# Patient Record
Sex: FEMALE | Race: WHITE | NOT HISPANIC OR LATINO | Employment: FULL TIME | URBAN - NONMETROPOLITAN AREA
[De-identification: names, ages, dates, MRNs, and addresses within clinical notes are randomized per-mention and may not be internally consistent; named-entity substitution may affect disease eponyms.]

---

## 2017-10-24 ENCOUNTER — TRANSCRIBE ORDERS (OUTPATIENT)
Dept: ADMINISTRATIVE | Facility: HOSPITAL | Age: 52
End: 2017-10-24

## 2017-10-24 DIAGNOSIS — Z12.39 SCREENING BREAST EXAMINATION: Primary | ICD-10-CM

## 2017-11-21 ENCOUNTER — HOSPITAL ENCOUNTER (OUTPATIENT)
Dept: MAMMOGRAPHY | Facility: HOSPITAL | Age: 52
Discharge: HOME | End: 2017-11-21

## 2018-05-15 ENCOUNTER — HOSPITAL ENCOUNTER (OUTPATIENT)
Dept: MAMMOGRAPHY | Facility: HOSPITAL | Age: 53
Discharge: HOME | End: 2018-05-15
Admitting: INTERNAL MEDICINE

## 2018-05-15 DIAGNOSIS — Z12.39 SCREENING BREAST EXAMINATION: ICD-10-CM

## 2018-05-15 PROCEDURE — 77063 BREAST TOMOSYNTHESIS BI: CPT | Performed by: RADIOLOGY

## 2018-05-15 PROCEDURE — 77067 SCR MAMMO BI INCL CAD: CPT | Performed by: RADIOLOGY

## 2018-05-15 PROCEDURE — 77063 BREAST TOMOSYNTHESIS BI: CPT

## 2018-05-15 PROCEDURE — 77067 SCR MAMMO BI INCL CAD: CPT

## 2018-11-26 DIAGNOSIS — M25.511 RIGHT SHOULDER PAIN, UNSPECIFIED CHRONICITY: Primary | ICD-10-CM

## 2018-11-27 ENCOUNTER — HOSPITAL ENCOUNTER (OUTPATIENT)
Dept: GENERAL RADIOLOGY | Facility: HOSPITAL | Age: 53
Discharge: HOME | End: 2018-11-27
Attending: ORTHOPAEDIC SURGERY | Admitting: ORTHOPAEDIC SURGERY

## 2018-11-27 ENCOUNTER — OFFICE VISIT (OUTPATIENT)
Dept: ORTHOPEDIC SURGERY | Facility: CLINIC | Age: 53
End: 2018-11-27

## 2018-11-27 VITALS
HEART RATE: 72 BPM | HEIGHT: 64 IN | DIASTOLIC BLOOD PRESSURE: 93 MMHG | WEIGHT: 145 LBS | BODY MASS INDEX: 24.75 KG/M2 | SYSTOLIC BLOOD PRESSURE: 152 MMHG

## 2018-11-27 DIAGNOSIS — M25.511 CHRONIC RIGHT SHOULDER PAIN: Primary | ICD-10-CM

## 2018-11-27 DIAGNOSIS — M25.511 RIGHT SHOULDER PAIN, UNSPECIFIED CHRONICITY: ICD-10-CM

## 2018-11-27 DIAGNOSIS — G89.29 CHRONIC RIGHT SHOULDER PAIN: Primary | ICD-10-CM

## 2018-11-27 PROCEDURE — 20610 DRAIN/INJ JOINT/BURSA W/O US: CPT | Performed by: ORTHOPAEDIC SURGERY

## 2018-11-27 PROCEDURE — 73030 X-RAY EXAM OF SHOULDER: CPT | Performed by: RADIOLOGY

## 2018-11-27 PROCEDURE — 73030 X-RAY EXAM OF SHOULDER: CPT

## 2018-11-27 PROCEDURE — 99203 OFFICE O/P NEW LOW 30 MIN: CPT | Performed by: ORTHOPAEDIC SURGERY

## 2018-11-27 RX ORDER — ATORVASTATIN CALCIUM 10 MG/1
10 TABLET, FILM COATED ORAL DAILY
COMMUNITY

## 2018-11-27 RX ORDER — CHOLECALCIFEROL (VITAMIN D3) 50 MCG
2000 TABLET ORAL DAILY
COMMUNITY

## 2018-11-27 RX ORDER — CETIRIZINE HYDROCHLORIDE 10 MG/1
10 TABLET ORAL DAILY
COMMUNITY

## 2018-11-27 RX ORDER — LEVOTHYROXINE SODIUM 0.07 MG/1
75 TABLET ORAL DAILY
COMMUNITY

## 2018-11-27 RX ADMIN — METHYLPREDNISOLONE ACETATE 80 MG: 40 INJECTION, SUSPENSION INTRA-ARTICULAR; INTRALESIONAL; INTRAMUSCULAR; SOFT TISSUE at 11:34

## 2018-11-27 RX ADMIN — BUPIVACAINE HYDROCHLORIDE 5 ML: 5 INJECTION, SOLUTION EPIDURAL; INTRACAUDAL at 11:34

## 2018-11-27 NOTE — PROGRESS NOTES
Patient: Tarsha White    YOB: 1965      Chief Complaint   Patient presents with   • Right Shoulder - Pain         History of Present Illness:  53-year-old white female predominantly right-handed but writes with her left hand who presents with chronic history of right shoulder pain.  Does not related to any specific injury.  States on the top to the outside of her shoulder.  Sometimes it keeps her up at night.  Does take aspirin for it at times doesn't seem to do to much.  Denies any neck pain numbness or paresthesias in the hand.  Pain with reaching behind her somewhat.  Positive smoking history, nondiabetic    Past Medical History:   Diagnosis Date   • Anemia    • Hyperlipidemia    • Hypertension    • Hypothyroidism    • Tuberculosis         Social History     Socioeconomic History   • Marital status:      Spouse name: Not on file   • Number of children: Not on file   • Years of education: Not on file   • Highest education level: Not on file   Social Needs   • Financial resource strain: Not on file   • Food insecurity - worry: Not on file   • Food insecurity - inability: Not on file   • Transportation needs - medical: Not on file   • Transportation needs - non-medical: Not on file   Occupational History   • Not on file   Tobacco Use   • Smoking status: Current Every Day Smoker     Packs/day: 1.00     Years: 30.00     Pack years: 30.00   • Smokeless tobacco: Never Used   Substance and Sexual Activity   • Alcohol use: No     Comment: Past   • Drug use: No   • Sexual activity: Not on file   Other Topics Concern   • Not on file   Social History Narrative   • Not on file        Review of Systems:    Pertinent positives evaluated and listed in HPI, others systems completed by patient and reviewed today.         Physical Exam: 53 y.o. female  General Appearance:    Alert and oriented x 3, cooperative, in no acute distress                   Vitals:    11/27/18 1104   BP: 152/93   Pulse: 72   Weight:  "65.8 kg (145 lb)   Height: 162.6 cm (64\")          Normal weight white female no apparent acute distress.  Her skin is intact valgus swelling bruising erythema or changes.  Right hand grossly neurovascular intact without swelling.  Good range of motion of neck without obvious discomfort.  She has full forward flexion and abduction compared to her left shoulder.  About 45° of external rotation bilaterally.  Internal rotation is just about T8 on the left and maybe T12 on the right.  Good strength with stressing supraspinatus but more painful on the right than the left.  Good subscapular strength with some mild pain on the right compared to the left.  Mildly positive speed's test.  Mild tenderness with abduction across the before meals joint.  Some mild impingement.        Radiology:     X-rays taken today reviewed myself show normal glenohumeral joint.  She does have moderate to severe acromioclavicular joint arthritis with some superior spurring      Large Joint Arthrocentesis: R subacromial bursa  Date/Time: 11/27/2018 11:34 AM  Consent given by: patient  Site marked: site marked  Supporting Documentation  Indications: pain and diagnostic evaluation   Procedure Details  Location: shoulder - R subacromial bursa  Needle size: 25 G  Approach: lateral  Medications administered: 80 mg methylPREDNISolone acetate 40 MG/ML; 5 mL bupivacaine (PF) 0.5 %  Patient tolerance: patient tolerated the procedure well with no immediate complications                Assessment/Plan: Chronic right shoulder pain.  We'll try one subacromial injection today for both diagnostic therapeutic purposes.  Discussed the detrimental effects of smoking.  We'll see her back in 3 weeks to see if she's had any response        I advised Tarsha of the risks of continuing to use tobacco, and I provided her with tobacco cessation educational materials in the After Visit Summary.     During this visit, I spent 1 minutes counseling the patient regarding " tobacco cessation.        Patient's Body mass index is 24.89 kg/m². BMI is within normal parameters. No follow-up required.        Discussion/Summary:    This chart was completed utilizing the dragon speech recognition software.  Grammatical errors, random word insertions, pronoun errors, and incomplete sentences or occasional consequences of the system due to software limitations, ambient noise, and hardware issues.  Any questions or concerns about the content, text, or information contained within the body of this dictation should be directly addressed to the physician for clarification          This document was signed by Eduardo Mesa M.D. November 27, 2018 11:20 AM

## 2018-11-28 RX ORDER — BUPIVACAINE HYDROCHLORIDE 5 MG/ML
5 INJECTION, SOLUTION EPIDURAL; INTRACAUDAL
Status: COMPLETED | OUTPATIENT
Start: 2018-11-27 | End: 2018-11-27

## 2018-11-28 RX ORDER — METHYLPREDNISOLONE ACETATE 40 MG/ML
80 INJECTION, SUSPENSION INTRA-ARTICULAR; INTRALESIONAL; INTRAMUSCULAR; SOFT TISSUE
Status: COMPLETED | OUTPATIENT
Start: 2018-11-27 | End: 2018-11-27

## 2018-12-06 ENCOUNTER — TELEPHONE (OUTPATIENT)
Dept: ORTHOPEDIC SURGERY | Facility: CLINIC | Age: 53
End: 2018-12-06

## 2018-12-06 NOTE — TELEPHONE ENCOUNTER
Patient called concerning the cortisone injection she received on 11/27/18 right subacromial bursa.  Patient states the right upper arm is cramping and is bruising.     Spoke with Dr. Mesa that can be a residual effect from the injection. Patient was instructed to use heat or ice and to do some gentle stretching if patient would like to go to physical therapy an order can be provided     These instructions were given to the patient. She does not think PT is needed at this time.

## 2018-12-18 ENCOUNTER — OFFICE VISIT (OUTPATIENT)
Dept: ORTHOPEDIC SURGERY | Facility: CLINIC | Age: 53
End: 2018-12-18

## 2018-12-18 DIAGNOSIS — M25.511 CHRONIC RIGHT SHOULDER PAIN: Primary | ICD-10-CM

## 2018-12-18 DIAGNOSIS — S46.111A RUPTURE OF RIGHT LONG HEAD BICEPS TENDON, INITIAL ENCOUNTER: ICD-10-CM

## 2018-12-18 DIAGNOSIS — G89.29 CHRONIC RIGHT SHOULDER PAIN: Primary | ICD-10-CM

## 2018-12-18 PROCEDURE — 99213 OFFICE O/P EST LOW 20 MIN: CPT | Performed by: ORTHOPAEDIC SURGERY

## 2018-12-18 NOTE — PROGRESS NOTES
Tarsha White   :1965    Date of encounter:2018    Chief Complaint   Patient presents with   • Right Shoulder - Follow-up       HPI:  Tarsha White is a 53 y.o. returns here today for follow-up of chronic right shoulder pain.  Her last office visit she received a subacromial steroid shot.  She states that she's doing really well without significant complaints of pain.  She states about 3 days after the injection she developed swelling and bruising along her biceps after lifting an object.  She states initially she had pain with significant swelling and ecchymosis as well as a deformity.  She states the pain and swelling are now gone but she still feels like she has a large biceps muscle.  She states that she isn't having any difficulties with strength and she is no longer having the pain in the shoulder like she was at her prior office visit.    PMH:   Patient Active Problem List   Diagnosis   • Allergic conjunctivitis   • Atopic rhinitis   • Major depressive disorder   • Elevated serum alkaline phosphatase level   • Fatigue   • Hypercalcemia   • Hypertension   • Hypothyroidism following radioiodine therapy   • Pain in joint   • Low back pain   • Lumbar radiculopathy   • Median nerve neuropathy   • Spasm   • Vitamin D deficiency   • Encounter for screening mammogram for malignant neoplasm of breast   • Papanicolaou smear for cervical cancer screening   • Chronic right shoulder pain       Exam:  General Appearance:    53 y.o. female  cooperative, in no acute distress.  Alert and oriented x 3,                 There were no vitals filed for this visit.       There is no height or weight on file to calculate BMI.     examination of the right shoulder reveals no swelling or ecchymosis.  She does have Cruz deformity.  She has good range of motion without any significant weakness. Her neurovascular status is intact.        Assessment    ICD-10-CM ICD-9-CM   1. Chronic right shoulder pain M25.511 719.41     G89.29 338.29   2. Rupture of right long head biceps tendon, initial encounter S46.111A 840.8       Plan:   a 53-year-old female with chronic right shoulder pain.  Previous subacromial steroid injection did improve pain significantly.  She's also had a spontaneous rupture of the long head of the biceps tendon.  She is currently doing well regards to this with no further pain and no complaints of weakness.  We do advise to try to take it easy and avoid any heavy lifting or overhead pushing or pulling.  She'll otherwise return back here on an as-needed basis with any recurrence of pain.    Written by, Anna MCGEE, acting as a scribe for Dr. Mesa

## 2019-05-21 ENCOUNTER — HOSPITAL ENCOUNTER (OUTPATIENT)
Dept: MAMMOGRAPHY | Facility: HOSPITAL | Age: 54
Discharge: HOME | End: 2019-05-21
Admitting: INTERNAL MEDICINE

## 2019-05-21 DIAGNOSIS — Z12.39 SCREENING BREAST EXAMINATION: ICD-10-CM

## 2019-05-21 PROCEDURE — 77067 SCR MAMMO BI INCL CAD: CPT | Performed by: RADIOLOGY

## 2019-05-21 PROCEDURE — 77063 BREAST TOMOSYNTHESIS BI: CPT | Performed by: RADIOLOGY

## 2019-05-21 PROCEDURE — 77063 BREAST TOMOSYNTHESIS BI: CPT

## 2019-05-21 PROCEDURE — 77067 SCR MAMMO BI INCL CAD: CPT

## 2019-08-15 ENCOUNTER — TRANSCRIBE ORDERS (OUTPATIENT)
Dept: ADMINISTRATIVE | Facility: HOSPITAL | Age: 54
End: 2019-08-15

## 2019-08-15 DIAGNOSIS — F17.210 CIGARETTE SMOKER: Primary | ICD-10-CM

## 2019-08-21 ENCOUNTER — APPOINTMENT (OUTPATIENT)
Dept: CT IMAGING | Facility: HOSPITAL | Age: 54
End: 2019-08-21

## 2020-01-06 ENCOUNTER — LAB (OUTPATIENT)
Dept: LAB | Facility: HOSPITAL | Age: 55
End: 2020-01-06

## 2020-01-06 ENCOUNTER — TRANSCRIBE ORDERS (OUTPATIENT)
Dept: ADMINISTRATIVE | Facility: HOSPITAL | Age: 55
End: 2020-01-06

## 2020-01-06 DIAGNOSIS — E89.0 POSTSURGICAL HYPOTHYROIDISM: ICD-10-CM

## 2020-01-06 DIAGNOSIS — E89.0 POSTSURGICAL HYPOTHYROIDISM: Primary | ICD-10-CM

## 2020-01-06 LAB — TSH SERPL DL<=0.05 MIU/L-ACNC: 2.09 UIU/ML (ref 0.27–4.2)

## 2020-01-06 PROCEDURE — 36415 COLL VENOUS BLD VENIPUNCTURE: CPT

## 2020-01-06 PROCEDURE — 84443 ASSAY THYROID STIM HORMONE: CPT

## 2020-11-09 ENCOUNTER — HOSPITAL ENCOUNTER (OUTPATIENT)
Dept: MAMMOGRAPHY | Facility: HOSPITAL | Age: 55
Discharge: HOME | End: 2020-11-09
Admitting: INTERNAL MEDICINE

## 2020-11-09 DIAGNOSIS — Z12.31 VISIT FOR SCREENING MAMMOGRAM: ICD-10-CM

## 2020-11-09 PROCEDURE — 77067 SCR MAMMO BI INCL CAD: CPT

## 2020-11-09 PROCEDURE — 77067 SCR MAMMO BI INCL CAD: CPT | Performed by: RADIOLOGY

## 2020-11-09 PROCEDURE — 77063 BREAST TOMOSYNTHESIS BI: CPT | Performed by: RADIOLOGY

## 2020-11-09 PROCEDURE — 77063 BREAST TOMOSYNTHESIS BI: CPT

## 2021-02-26 ENCOUNTER — TRANSCRIBE ORDERS (OUTPATIENT)
Dept: ADMINISTRATIVE | Facility: HOSPITAL | Age: 56
End: 2021-02-26

## 2021-02-26 DIAGNOSIS — F17.210 CIGARETTE SMOKER: Primary | ICD-10-CM

## 2021-03-11 ENCOUNTER — HOSPITAL ENCOUNTER (OUTPATIENT)
Dept: CT IMAGING | Facility: HOSPITAL | Age: 56
Discharge: HOME | End: 2021-03-11
Admitting: INTERNAL MEDICINE

## 2021-03-11 DIAGNOSIS — F17.210 CIGARETTE SMOKER: ICD-10-CM

## 2021-03-11 PROCEDURE — 71271 CT THORAX LUNG CANCER SCR C-: CPT

## 2021-03-11 PROCEDURE — 71271 CT THORAX LUNG CANCER SCR C-: CPT | Performed by: RADIOLOGY

## 2021-03-16 ENCOUNTER — BULK ORDERING (OUTPATIENT)
Dept: CASE MANAGEMENT | Facility: OTHER | Age: 56
End: 2021-03-16

## 2021-03-16 DIAGNOSIS — Z23 IMMUNIZATION DUE: ICD-10-CM

## 2021-09-09 ENCOUNTER — IMMUNIZATION (OUTPATIENT)
Dept: VACCINE CLINIC | Facility: HOSPITAL | Age: 56
End: 2021-09-09

## 2021-09-09 PROCEDURE — 91300 HC SARSCOV02 VAC 30MCG/0.3ML IM: CPT | Performed by: INTERNAL MEDICINE

## 2021-09-09 PROCEDURE — 0001A: CPT | Performed by: INTERNAL MEDICINE

## 2021-09-30 ENCOUNTER — IMMUNIZATION (OUTPATIENT)
Dept: VACCINE CLINIC | Facility: HOSPITAL | Age: 56
End: 2021-09-30

## 2021-09-30 PROCEDURE — 0002A: CPT | Performed by: INTERNAL MEDICINE

## 2021-09-30 PROCEDURE — 91300 HC SARSCOV02 VAC 30MCG/0.3ML IM: CPT | Performed by: INTERNAL MEDICINE

## 2021-10-13 ENCOUNTER — APPOINTMENT (OUTPATIENT)
Dept: GENERAL RADIOLOGY | Facility: HOSPITAL | Age: 56
End: 2021-10-13

## 2021-10-13 ENCOUNTER — APPOINTMENT (OUTPATIENT)
Dept: CT IMAGING | Facility: HOSPITAL | Age: 56
End: 2021-10-13

## 2021-10-13 ENCOUNTER — HOSPITAL ENCOUNTER (EMERGENCY)
Facility: HOSPITAL | Age: 56
Discharge: ANOTHER HOSPITAL | End: 2021-10-14
Attending: STUDENT IN AN ORGANIZED HEALTH CARE EDUCATION/TRAINING PROGRAM | Admitting: EMERGENCY MEDICINE

## 2021-10-13 DIAGNOSIS — J18.9 PNEUMONIA OF BOTH LUNGS DUE TO INFECTIOUS ORGANISM, UNSPECIFIED PART OF LUNG: ICD-10-CM

## 2021-10-13 DIAGNOSIS — J96.01 ACUTE RESPIRATORY FAILURE WITH HYPOXIA (HCC): ICD-10-CM

## 2021-10-13 DIAGNOSIS — S22.49XA CLOSED FRACTURE OF MULTIPLE RIBS, UNSPECIFIED LATERALITY, INITIAL ENCOUNTER: Primary | ICD-10-CM
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
## 2021-10-13 LAB
ABO GROUP BLD: NORMAL
ALBUMIN SERPL-MCNC: 4.46 G/DL (ref 3.5–5.2)
ALBUMIN/GLOB SERPL: 1.6 G/DL
ALP SERPL-CCNC: 102 U/L (ref 39–117)
ALT SERPL W P-5'-P-CCNC: 39 U/L (ref 1–33)
ANION GAP SERPL CALCULATED.3IONS-SCNC: 11 MMOL/L (ref 5–15)
APTT PPP: 28.3 SECONDS (ref 25.5–35.4)
AST SERPL-CCNC: 53 U/L (ref 1–32)
BASOPHILS # BLD AUTO: 0.07 10*3/MM3 (ref 0–0.2)
BASOPHILS NFR BLD AUTO: 0.3 % (ref 0–1.5)
BILIRUB SERPL-MCNC: 0.2 MG/DL (ref 0–1.2)
BLD GP AB SCN SERPL QL: NEGATIVE
BUN SERPL-MCNC: 13 MG/DL (ref 6–20)
BUN/CREAT SERPL: 14.1 (ref 7–25)
CALCIUM SPEC-SCNC: 8.9 MG/DL (ref 8.6–10.5)
CHLORIDE SERPL-SCNC: 104 MMOL/L (ref 98–107)
CO2 SERPL-SCNC: 23 MMOL/L (ref 22–29)
CREAT SERPL-MCNC: 0.92 MG/DL (ref 0.57–1)
DEPRECATED RDW RBC AUTO: 46.1 FL (ref 37–54)
EOSINOPHIL # BLD AUTO: 0.11 10*3/MM3 (ref 0–0.4)
EOSINOPHIL NFR BLD AUTO: 0.5 % (ref 0.3–6.2)
ERYTHROCYTE [DISTWIDTH] IN BLOOD BY AUTOMATED COUNT: 13.8 % (ref 12.3–15.4)
GFR SERPL CREATININE-BSD FRML MDRD: 63 ML/MIN/1.73
GLOBULIN UR ELPH-MCNC: 2.7 GM/DL
GLUCOSE SERPL-MCNC: 139 MG/DL (ref 65–99)
HCT VFR BLD AUTO: 40.2 % (ref 34–46.6)
HGB BLD-MCNC: 13.1 G/DL (ref 12–15.9)
HOLD SPECIMEN: NORMAL
HOLD SPECIMEN: NORMAL
IMM GRANULOCYTES # BLD AUTO: 0.23 10*3/MM3 (ref 0–0.05)
IMM GRANULOCYTES NFR BLD AUTO: 1.1 % (ref 0–0.5)
INR PPP: 0.99 (ref 0.9–1.1)
LYMPHOCYTES # BLD AUTO: 2.22 10*3/MM3 (ref 0.7–3.1)
LYMPHOCYTES NFR BLD AUTO: 10.9 % (ref 19.6–45.3)
MCH RBC QN AUTO: 29.6 PG (ref 26.6–33)
MCHC RBC AUTO-ENTMCNC: 32.6 G/DL (ref 31.5–35.7)
MCV RBC AUTO: 90.7 FL (ref 79–97)
MONOCYTES # BLD AUTO: 0.89 10*3/MM3 (ref 0.1–0.9)
MONOCYTES NFR BLD AUTO: 4.3 % (ref 5–12)
NEUTROPHILS NFR BLD AUTO: 16.94 10*3/MM3 (ref 1.7–7)
NEUTROPHILS NFR BLD AUTO: 82.9 % (ref 42.7–76)
NRBC BLD AUTO-RTO: 0 /100 WBC (ref 0–0.2)
PLATELET # BLD AUTO: 243 10*3/MM3 (ref 140–450)
PMV BLD AUTO: 9.7 FL (ref 6–12)
POTASSIUM SERPL-SCNC: 3.7 MMOL/L (ref 3.5–5.2)
PROT SERPL-MCNC: 7.2 G/DL (ref 6–8.5)
PROTHROMBIN TIME: 13.5 SECONDS (ref 12.8–14.5)
RBC # BLD AUTO: 4.43 10*6/MM3 (ref 3.77–5.28)
RH BLD: POSITIVE
SODIUM SERPL-SCNC: 138 MMOL/L (ref 136–145)
T&S EXPIRATION DATE: NORMAL
TROPONIN T SERPL-MCNC: <0.01 NG/ML (ref 0–0.03)
WBC # BLD AUTO: 20.46 10*3/MM3 (ref 3.4–10.8)
WHOLE BLOOD HOLD SPECIMEN: NORMAL
WHOLE BLOOD HOLD SPECIMEN: NORMAL

## 2021-10-13 PROCEDURE — 72131 CT LUMBAR SPINE W/O DYE: CPT

## 2021-10-13 PROCEDURE — 80053 COMPREHEN METABOLIC PANEL: CPT | Performed by: NURSE PRACTITIONER

## 2021-10-13 PROCEDURE — 73090 X-RAY EXAM OF FOREARM: CPT

## 2021-10-13 PROCEDURE — 85025 COMPLETE CBC W/AUTO DIFF WBC: CPT | Performed by: NURSE PRACTITIONER

## 2021-10-13 PROCEDURE — 73110 X-RAY EXAM OF WRIST: CPT

## 2021-10-13 PROCEDURE — 70450 CT HEAD/BRAIN W/O DYE: CPT

## 2021-10-13 PROCEDURE — 74177 CT ABD & PELVIS W/CONTRAST: CPT

## 2021-10-13 PROCEDURE — 73080 X-RAY EXAM OF ELBOW: CPT | Performed by: RADIOLOGY

## 2021-10-13 PROCEDURE — 96374 THER/PROPH/DIAG INJ IV PUSH: CPT

## 2021-10-13 PROCEDURE — 84484 ASSAY OF TROPONIN QUANT: CPT | Performed by: STUDENT IN AN ORGANIZED HEALTH CARE EDUCATION/TRAINING PROGRAM

## 2021-10-13 PROCEDURE — 0 IOPAMIDOL PER 1 ML: Performed by: STUDENT IN AN ORGANIZED HEALTH CARE EDUCATION/TRAINING PROGRAM

## 2021-10-13 PROCEDURE — 25010000002 HYDROMORPHONE 1 MG/ML SOLUTION: Performed by: NURSE PRACTITIONER

## 2021-10-13 PROCEDURE — 73090 X-RAY EXAM OF FOREARM: CPT | Performed by: RADIOLOGY

## 2021-10-13 PROCEDURE — 86901 BLOOD TYPING SEROLOGIC RH(D): CPT | Performed by: NURSE PRACTITIONER

## 2021-10-13 PROCEDURE — 71275 CT ANGIOGRAPHY CHEST: CPT

## 2021-10-13 PROCEDURE — 36415 COLL VENOUS BLD VENIPUNCTURE: CPT

## 2021-10-13 PROCEDURE — 85610 PROTHROMBIN TIME: CPT | Performed by: STUDENT IN AN ORGANIZED HEALTH CARE EDUCATION/TRAINING PROGRAM

## 2021-10-13 PROCEDURE — 86850 RBC ANTIBODY SCREEN: CPT | Performed by: NURSE PRACTITIONER

## 2021-10-13 PROCEDURE — 73110 X-RAY EXAM OF WRIST: CPT | Performed by: RADIOLOGY

## 2021-10-13 PROCEDURE — 73080 X-RAY EXAM OF ELBOW: CPT

## 2021-10-13 PROCEDURE — 72125 CT NECK SPINE W/O DYE: CPT

## 2021-10-13 PROCEDURE — 72128 CT CHEST SPINE W/O DYE: CPT

## 2021-10-13 PROCEDURE — 85730 THROMBOPLASTIN TIME PARTIAL: CPT | Performed by: STUDENT IN AN ORGANIZED HEALTH CARE EDUCATION/TRAINING PROGRAM

## 2021-10-13 PROCEDURE — 99284 EMERGENCY DEPT VISIT MOD MDM: CPT

## 2021-10-13 PROCEDURE — 86900 BLOOD TYPING SEROLOGIC ABO: CPT | Performed by: NURSE PRACTITIONER

## 2021-10-13 RX ADMIN — IOPAMIDOL 85 ML: 755 INJECTION, SOLUTION INTRAVENOUS at 22:46

## 2021-10-13 RX ADMIN — HYDROMORPHONE HYDROCHLORIDE 1 MG: 1 INJECTION, SOLUTION INTRAMUSCULAR; INTRAVENOUS; SUBCUTANEOUS at 19:48
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
## 2021-10-14 ENCOUNTER — APPOINTMENT (OUTPATIENT)
Dept: GENERAL RADIOLOGY | Facility: HOSPITAL | Age: 56
End: 2021-10-14

## 2021-10-14 ENCOUNTER — APPOINTMENT (OUTPATIENT)
Dept: CT IMAGING | Facility: HOSPITAL | Age: 56
End: 2021-10-14

## 2021-10-14 VITALS
BODY MASS INDEX: 26.22 KG/M2 | RESPIRATION RATE: 20 BRPM | HEIGHT: 63 IN | SYSTOLIC BLOOD PRESSURE: 129 MMHG | TEMPERATURE: 98.1 F | OXYGEN SATURATION: 95 % | WEIGHT: 148 LBS | HEART RATE: 76 BPM | DIASTOLIC BLOOD PRESSURE: 76 MMHG

## 2021-10-14 LAB
A-A DO2: 44.2 MMHG (ref 0–300)
ARTERIAL PATENCY WRIST A: ABNORMAL
ATMOSPHERIC PRESS: 726 MMHG
BASE EXCESS BLDA CALC-SCNC: -1.6 MMOL/L (ref 0–2)
BDY SITE: ABNORMAL
BODY TEMPERATURE: 0 C
CO2 BLDA-SCNC: 24.3 MMOL/L (ref 22–33)
COHGB MFR BLD: 3.5 % (ref 0–5)
FLUAV RNA RESP QL NAA+PROBE: NOT DETECTED
FLUBV RNA RESP QL NAA+PROBE: NOT DETECTED
HCO3 BLDA-SCNC: 23.2 MMOL/L (ref 20–26)
HCT VFR BLD CALC: 40.1 % (ref 38–51)
HGB BLDA-MCNC: 13.1 G/DL (ref 13.5–17.5)
INHALED O2 CONCENTRATION: 21 %
Lab: ABNORMAL
METHGB BLD QL: 0.3 % (ref 0–3)
MODALITY: ABNORMAL
NOTE: ABNORMAL
OXYHGB MFR BLDV: 87.3 % (ref 94–99)
PCO2 BLDA: 38.5 MM HG (ref 35–45)
PCO2 TEMP ADJ BLD: ABNORMAL MM[HG]
PH BLDA: 7.39 PH UNITS (ref 7.35–7.45)
PH, TEMP CORRECTED: ABNORMAL
PO2 BLDA: 55.1 MM HG (ref 83–108)
PO2 TEMP ADJ BLD: ABNORMAL MM[HG]
SAO2 % BLDCOA: 90.7 % (ref 94–99)
SARS-COV-2 RNA RESP QL NAA+PROBE: NOT DETECTED
VENTILATOR MODE: ABNORMAL

## 2021-10-14 PROCEDURE — 73610 X-RAY EXAM OF ANKLE: CPT

## 2021-10-14 PROCEDURE — 82805 BLOOD GASES W/O2 SATURATION: CPT

## 2021-10-14 PROCEDURE — 83050 HGB METHEMOGLOBIN QUAN: CPT

## 2021-10-14 PROCEDURE — 96376 TX/PRO/DX INJ SAME DRUG ADON: CPT

## 2021-10-14 PROCEDURE — 87636 SARSCOV2 & INF A&B AMP PRB: CPT | Performed by: STUDENT IN AN ORGANIZED HEALTH CARE EDUCATION/TRAINING PROGRAM

## 2021-10-14 PROCEDURE — 82375 ASSAY CARBOXYHB QUANT: CPT

## 2021-10-14 PROCEDURE — 36600 WITHDRAWAL OF ARTERIAL BLOOD: CPT

## 2021-10-14 PROCEDURE — 94799 UNLISTED PULMONARY SVC/PX: CPT

## 2021-10-14 PROCEDURE — 73700 CT LOWER EXTREMITY W/O DYE: CPT

## 2021-10-14 PROCEDURE — 25010000002 HYDROMORPHONE PER 4 MG: Performed by: EMERGENCY MEDICINE

## 2021-10-14 RX ORDER — HYDROMORPHONE HYDROCHLORIDE 1 MG/ML
0.5 INJECTION, SOLUTION INTRAMUSCULAR; INTRAVENOUS; SUBCUTANEOUS ONCE
Status: COMPLETED | OUTPATIENT
Start: 2021-10-14 | End: 2021-10-14

## 2021-10-14 RX ORDER — HYDROCODONE BITARTRATE AND ACETAMINOPHEN 5; 325 MG/1; MG/1
1 TABLET ORAL EVERY 8 HOURS PRN
Qty: 10 TABLET | Refills: 0 | Status: SHIPPED | OUTPATIENT
Start: 2021-10-14

## 2021-10-14 RX ORDER — AMOXICILLIN AND CLAVULANATE POTASSIUM 875; 125 MG/1; MG/1
1 TABLET, FILM COATED ORAL ONCE
Status: COMPLETED | OUTPATIENT
Start: 2021-10-14 | End: 2021-10-14

## 2021-10-14 RX ORDER — AMOXICILLIN AND CLAVULANATE POTASSIUM 875; 125 MG/1; MG/1
1 TABLET, FILM COATED ORAL 2 TIMES DAILY
Qty: 13 TABLET | Refills: 0 | Status: SHIPPED | OUTPATIENT
Start: 2021-10-14

## 2021-10-14 RX ORDER — HYDROCODONE BITARTRATE AND ACETAMINOPHEN 5; 325 MG/1; MG/1
1 TABLET ORAL ONCE
Status: COMPLETED | OUTPATIENT
Start: 2021-10-14 | End: 2021-10-14

## 2021-10-14 RX ADMIN — AMOXICILLIN AND CLAVULANATE POTASSIUM 1 TABLET: 875; 125 TABLET, FILM COATED ORAL at 02:54

## 2021-10-14 RX ADMIN — HYDROCODONE BITARTRATE AND ACETAMINOPHEN 1 TABLET: 5; 325 TABLET ORAL at 04:15

## 2021-10-14 RX ADMIN — HYDROMORPHONE HYDROCHLORIDE 0.5 MG: 1 INJECTION, SOLUTION INTRAMUSCULAR; INTRAVENOUS; SUBCUTANEOUS at 07:47

## 2021-10-14 NOTE — ED NOTES
Called [facility] to transport pt to [facility].  c will call back.      Shilpa Canchola  10/14/21 1709
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
## 2021-10-14 NOTE — ED NOTES
Dr. Garcia is on the line with Dr. Sage with [facility] at this time.      Symes, Heather  10/14/21 4295

## 2021-10-14 NOTE — ED NOTES
Right hand wound cleaned at this time. Cleaned with sterile water, 4x4 gauze applied over area, with conforming stretch gauze applied.      Ernesto Koroma RN  10/14/21 0103

## 2021-10-14 NOTE — ED NOTES
Dr. Garcia made aware of patients right foot throbbing. Per Dr. Garcia with verbal and readback, remove ace bandage and reapply looser. Completed at this time.      Ernesto Koroma, RN  10/14/21 0519

## 2021-10-14 NOTE — ED NOTES
Called [facility] to transport pt to [facility].  emilia accepts with an eta of 9 mins      Shilpa Canchola  10/14/21 1145

## 2021-10-14 NOTE — ED NOTES
Per Dr. Garcia with verbal order and readback, patient can be removed from back board and c-collar. Will proceed with provider orders.      Ernesto Koroma, RN  10/13/21 4941

## 2021-10-14 NOTE — ED NOTES
Per Dr. Garcia, 2L NC was okay to apply at this time. O2 sat was dropping at times to 89% on room air. O2 sat after O2 applied, 94%.     Ernesto Koroma, RN  10/13/21 2402

## 2022-01-26 ENCOUNTER — HOSPITAL ENCOUNTER (OUTPATIENT)
Dept: MAMMOGRAPHY | Facility: HOSPITAL | Age: 57
Discharge: HOME | End: 2022-01-26
Admitting: INTERNAL MEDICINE

## 2022-01-26 DIAGNOSIS — Z12.31 VISIT FOR SCREENING MAMMOGRAM: ICD-10-CM

## 2022-01-26 PROCEDURE — 77067 SCR MAMMO BI INCL CAD: CPT

## 2022-01-26 PROCEDURE — 77063 BREAST TOMOSYNTHESIS BI: CPT | Performed by: RADIOLOGY

## 2022-01-26 PROCEDURE — 77067 SCR MAMMO BI INCL CAD: CPT | Performed by: RADIOLOGY

## 2022-01-26 PROCEDURE — 77063 BREAST TOMOSYNTHESIS BI: CPT

## 2022-03-07 ENCOUNTER — HOSPITAL ENCOUNTER (OUTPATIENT)
Dept: GENERAL RADIOLOGY | Facility: HOSPITAL | Age: 57
Discharge: HOME | End: 2022-03-07

## 2022-03-07 ENCOUNTER — TRANSCRIBE ORDERS (OUTPATIENT)
Dept: ADMINISTRATIVE | Facility: HOSPITAL | Age: 57
End: 2022-03-07

## 2022-03-07 DIAGNOSIS — M54.2 NECK PAIN: ICD-10-CM

## 2022-03-07 DIAGNOSIS — M54.2 NECK PAIN: Primary | ICD-10-CM

## 2022-03-07 PROCEDURE — 72040 X-RAY EXAM NECK SPINE 2-3 VW: CPT | Performed by: RADIOLOGY

## 2022-03-07 PROCEDURE — 72072 X-RAY EXAM THORAC SPINE 3VWS: CPT | Performed by: RADIOLOGY

## 2022-03-07 PROCEDURE — 72040 X-RAY EXAM NECK SPINE 2-3 VW: CPT

## 2022-03-07 PROCEDURE — 72072 X-RAY EXAM THORAC SPINE 3VWS: CPT

## 2022-09-22 ENCOUNTER — TRANSCRIBE ORDERS (OUTPATIENT)
Dept: ADMINISTRATIVE | Facility: HOSPITAL | Age: 57
End: 2022-09-22

## 2022-09-22 DIAGNOSIS — F17.210 CIGARETTE SMOKER: Primary | ICD-10-CM

## 2022-10-12 ENCOUNTER — HOSPITAL ENCOUNTER (OUTPATIENT)
Dept: CT IMAGING | Facility: HOSPITAL | Age: 57
Discharge: HOME | End: 2022-10-12
Admitting: INTERNAL MEDICINE

## 2022-10-12 DIAGNOSIS — F17.210 CIGARETTE SMOKER: ICD-10-CM

## 2022-10-12 PROCEDURE — 71271 CT THORAX LUNG CANCER SCR C-: CPT

## 2022-10-12 PROCEDURE — 71271 CT THORAX LUNG CANCER SCR C-: CPT | Performed by: RADIOLOGY

## 2023-03-22 ENCOUNTER — TRANSCRIBE ORDERS (OUTPATIENT)
Dept: ADMINISTRATIVE | Facility: HOSPITAL | Age: 58
End: 2023-03-22
Payer: MEDICAID

## 2023-03-22 DIAGNOSIS — Z12.39 ENCOUNTER FOR SCREENING FOR MALIGNANT NEOPLASM OF BREAST, UNSPECIFIED SCREENING MODALITY: Primary | ICD-10-CM

## 2023-04-10 ENCOUNTER — OFFICE VISIT (OUTPATIENT)
Dept: PULMONOLOGY | Facility: CLINIC | Age: 58
End: 2023-04-10
Payer: MEDICAID

## 2023-04-10 VITALS
BODY MASS INDEX: 24.8 KG/M2 | WEIGHT: 140 LBS | HEIGHT: 63 IN | OXYGEN SATURATION: 98 % | HEART RATE: 74 BPM | DIASTOLIC BLOOD PRESSURE: 74 MMHG | TEMPERATURE: 98.4 F | SYSTOLIC BLOOD PRESSURE: 126 MMHG

## 2023-04-10 DIAGNOSIS — J43.2 CENTRILOBULAR EMPHYSEMA: Primary | ICD-10-CM

## 2023-04-10 DIAGNOSIS — Z72.0 TOBACCO ABUSE: ICD-10-CM

## 2023-04-10 PROCEDURE — 1160F RVW MEDS BY RX/DR IN RCRD: CPT | Performed by: PHYSICIAN ASSISTANT

## 2023-04-10 PROCEDURE — 99204 OFFICE O/P NEW MOD 45 MIN: CPT | Performed by: PHYSICIAN ASSISTANT

## 2023-04-10 PROCEDURE — 3074F SYST BP LT 130 MM HG: CPT | Performed by: PHYSICIAN ASSISTANT

## 2023-04-10 PROCEDURE — 1159F MED LIST DOCD IN RCRD: CPT | Performed by: PHYSICIAN ASSISTANT

## 2023-04-10 PROCEDURE — 3078F DIAST BP <80 MM HG: CPT | Performed by: PHYSICIAN ASSISTANT

## 2023-04-10 RX ORDER — ALBUTEROL SULFATE 90 UG/1
2 AEROSOL, METERED RESPIRATORY (INHALATION) EVERY 4 HOURS PRN
Qty: 18 G | Refills: 11 | Status: SHIPPED | OUTPATIENT
Start: 2023-04-10

## 2023-04-10 RX ORDER — TRAZODONE HYDROCHLORIDE 50 MG/1
TABLET ORAL
COMMUNITY
Start: 2023-03-27

## 2023-04-10 RX ORDER — LEVOCETIRIZINE DIHYDROCHLORIDE 5 MG/1
TABLET, FILM COATED ORAL
COMMUNITY
Start: 2023-03-27

## 2023-04-10 RX ORDER — BREXPIPRAZOLE 1 MG/1
TABLET ORAL
COMMUNITY
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
## 2023-04-21 ENCOUNTER — TELEPHONE (OUTPATIENT)
Dept: PULMONOLOGY | Facility: CLINIC | Age: 58
End: 2023-04-21
Payer: MEDICAID

## 2023-04-21 NOTE — TELEPHONE ENCOUNTER
Attempted to contact patient to notify her of her alpha 1 antitrypsin results. Left her a message informing her that result was normal and if she had any questions to call the office.

## 2023-04-24 ENCOUNTER — HOSPITAL ENCOUNTER (OUTPATIENT)
Dept: MAMMOGRAPHY | Facility: HOSPITAL | Age: 58
Discharge: HOME | End: 2023-04-24
Admitting: INTERNAL MEDICINE
Payer: MEDICAID

## 2023-04-24 DIAGNOSIS — Z12.39 ENCOUNTER FOR SCREENING FOR MALIGNANT NEOPLASM OF BREAST, UNSPECIFIED SCREENING MODALITY: ICD-10-CM

## 2023-04-24 PROCEDURE — 77063 BREAST TOMOSYNTHESIS BI: CPT

## 2023-04-24 PROCEDURE — 77063 BREAST TOMOSYNTHESIS BI: CPT | Performed by: RADIOLOGY

## 2023-04-24 PROCEDURE — 77067 SCR MAMMO BI INCL CAD: CPT

## 2023-04-24 PROCEDURE — 77067 SCR MAMMO BI INCL CAD: CPT | Performed by: RADIOLOGY

## 2023-05-02 ENCOUNTER — HOSPITAL ENCOUNTER (OUTPATIENT)
Dept: RESPIRATORY THERAPY | Facility: HOSPITAL | Age: 58
Discharge: HOME | End: 2023-05-02
Admitting: PHYSICIAN ASSISTANT
Payer: MEDICAID

## 2023-05-02 VITALS — OXYGEN SATURATION: 95 % | HEART RATE: 72 BPM | RESPIRATION RATE: 16 BRPM

## 2023-05-02 DIAGNOSIS — J43.2 CENTRILOBULAR EMPHYSEMA: ICD-10-CM

## 2023-05-02 PROCEDURE — 94799 UNLISTED PULMONARY SVC/PX: CPT

## 2023-05-02 PROCEDURE — 94729 DIFFUSING CAPACITY: CPT

## 2023-05-02 PROCEDURE — 94640 AIRWAY INHALATION TREATMENT: CPT

## 2023-05-02 PROCEDURE — 94760 N-INVAS EAR/PLS OXIMETRY 1: CPT

## 2023-05-02 PROCEDURE — 94060 EVALUATION OF WHEEZING: CPT

## 2023-05-02 PROCEDURE — 94726 PLETHYSMOGRAPHY LUNG VOLUMES: CPT

## 2023-05-02 RX ORDER — ALBUTEROL SULFATE 2.5 MG/3ML
2.5 SOLUTION RESPIRATORY (INHALATION) ONCE
Status: COMPLETED | OUTPATIENT
Start: 2023-05-02 | End: 2023-05-02

## 2023-05-02 RX ADMIN — ALBUTEROL SULFATE 2.5 MG: 2.5 SOLUTION RESPIRATORY (INHALATION) at 10:18

## 2023-05-03 ENCOUNTER — TELEPHONE (OUTPATIENT)
Dept: PULMONOLOGY | Facility: CLINIC | Age: 58
End: 2023-05-03
Payer: MEDICAID

## 2023-05-03 NOTE — TELEPHONE ENCOUNTER
Contacted patient to discuss PFT results. Notified her that results were normal and did not reveal any obstructive or restrictive lung disease. Will continue albuterol inhaler as needed (reports that she has not had to start using yet).

## 2023-07-31 DIAGNOSIS — M25.512 LEFT SHOULDER PAIN, UNSPECIFIED CHRONICITY: Primary | ICD-10-CM

## 2023-08-01 ENCOUNTER — OFFICE VISIT (OUTPATIENT)
Dept: ORTHOPEDIC SURGERY | Facility: CLINIC | Age: 58
End: 2023-08-01
Payer: MEDICAID

## 2023-08-01 ENCOUNTER — HOSPITAL ENCOUNTER (OUTPATIENT)
Dept: GENERAL RADIOLOGY | Facility: HOSPITAL | Age: 58
Discharge: HOME | End: 2023-08-01
Admitting: PHYSICIAN ASSISTANT
Payer: MEDICAID

## 2023-08-01 VITALS
HEART RATE: 100 BPM | DIASTOLIC BLOOD PRESSURE: 81 MMHG | BODY MASS INDEX: 24.8 KG/M2 | WEIGHT: 140 LBS | HEIGHT: 63 IN | SYSTOLIC BLOOD PRESSURE: 128 MMHG

## 2023-08-01 DIAGNOSIS — M75.82 TENDINITIS OF LEFT ROTATOR CUFF: Primary | ICD-10-CM

## 2023-08-01 PROCEDURE — 73030 X-RAY EXAM OF SHOULDER: CPT | Performed by: RADIOLOGY

## 2023-08-01 PROCEDURE — 73030 X-RAY EXAM OF SHOULDER: CPT

## 2023-08-01 RX ADMIN — METHYLPREDNISOLONE ACETATE 80 MG: 80 INJECTION, SUSPENSION INTRA-ARTICULAR; INTRALESIONAL; INTRAMUSCULAR; SOFT TISSUE at 13:34

## 2023-08-01 RX ADMIN — LIDOCAINE HYDROCHLORIDE 5 ML: 10 INJECTION, SOLUTION EPIDURAL; INFILTRATION; INTRACAUDAL; PERINEURAL at 13:34

## 2023-08-01 NOTE — PROGRESS NOTES
[facility] [place] [facility] Surgery New Patient Visit          Patient: Tarsha White  YOB: 1965  Date of Encounter: 08/01/2023  PCP: Chapo Abel MD      Subjective     Chief Complaint   Patient presents with    Left Shoulder - Pain           History of Present Illness:     Tarsha White is a 58 y.o. female presents today as result of left shoulder pain 3 to 4-month history.  Patient reports only mild pain when this first began however this is progressed to worsening shoulder pain upon any overhead range of motion or repetitive activities.  Patient describes no specific injury.  Patient is in the mid anti-inflammatory medication and activity modification with no alleviation of pain symptoms.  Patient describes no significant catching or locking and denies any instability.  Patient denies any paresthesias.        Patient Active Problem List   Diagnosis    Allergic conjunctivitis    Atopic rhinitis    Major depressive disorder    Elevated serum alkaline phosphatase level    Fatigue    Hypercalcemia    Hypertension    Hypothyroidism following radioiodine therapy    Pain in joint    Low back pain    Lumbar radiculopathy    Median nerve neuropathy    Spasm    Vitamin D deficiency    Encounter for screening mammogram for malignant neoplasm of breast    Papanicolaou smear for cervical cancer screening    Chronic right shoulder pain     Past Medical History:   Diagnosis Date    Anemia     Hyperlipidemia     Hypertension     Hypothyroidism     Tuberculosis      Past Surgical History:   Procedure Laterality Date    BREAST SURGERY      REDUCTION MAMMAPLASTY Bilateral 2000         TUBAL ABDOMINAL LIGATION       Social History     Occupational History    Not on file   Tobacco Use    Smoking status: Every Day     Packs/day: 1.00     Years: 44.00     Pack years: 44.00     Types: Cigarettes    Smokeless tobacco: Never   Vaping Use    Vaping Use: Never used   Substance and Sexual Activity    Alcohol use: No     Comment:  Past    Drug use: No    Sexual activity: Defer    Tarsha White  reports that she has been smoking cigarettes. She has a 44.00 pack-year smoking history. She has never used smokeless tobacco.. I have educated her on the risk of diseases from using tobacco products such as cancer, COPD, and heart disease.     I advised her to quit and she is not willing to quit.    I spent 3  minutes counseling the patient.          Social History     Social History Narrative    Not on file     Family History   Problem Relation Age of Onset    COPD Mother     Thyroid disease Mother     Hypertension Mother     Emphysema Father     Hypertension Father     Heart disease Father     Cancer Father     COPD Sister     Hypertension Sister     Breast cancer Sister 50    Hypertension Brother     Heart disease Brother     Cancer Maternal Aunt     Cancer Paternal Uncle      Current Outpatient Medications   Medication Sig Dispense Refill    Aspirin-Salicylamide-Caffeine (BC HEADACHE POWDER PO) Take  by mouth.      atorvastatin (LIPITOR) 10 MG tablet Take 1 tablet by mouth Daily.      Brexpiprazole (Rexulti) 1 MG tablet Take  by mouth.      Cholecalciferol (VITAMIN D) 2000 units tablet Take 1 tablet by mouth Daily.      gabapentin (NEURONTIN) 300 MG capsule Take 1 capsule by mouth 3 (Three) Times a Day. 90 capsule 3    ibuprofen (ADVIL,MOTRIN) 800 MG tablet Take 1 tablet by mouth 2 (Two) Times a Day As Needed for moderate pain (4-6). 60 tablet 1    levocetirizine (XYZAL) 5 MG tablet TAKE ONE TABLET BY MOUTH EVERY NIGHT AT BEDTIME FOR ALLERGIES      levothyroxine (SYNTHROID, LEVOTHROID) 75 MCG tablet Take 1 tablet by mouth Daily.      metoprolol succinate XL (TOPROL-XL) 25 MG 24 hr tablet Take 1 tablet by mouth daily. 90 tablet 1    traZODone (DESYREL) 50 MG tablet TAKE ONE TABLET BY MOUTH EVERY NIGHT AT BEDTIME FOR INSOMNIA      venlafaxine XR (EFFEXOR-XR) 150 MG 24 hr capsule Take 1 capsule by mouth daily. 90 capsule 1    albuterol sulfate   "(90 Base) MCG/ACT inhaler Inhale 2 puffs Every 4 (Four) Hours As Needed for Wheezing. (Patient not taking: Reported on 8/1/2023) 18 g 11     No current facility-administered medications for this visit.     No Known Allergies         Review of Systems   Constitutional: Negative.   HENT: Negative.     Eyes: Negative.    Cardiovascular: Negative.    Respiratory:  Positive for cough.    Endocrine: Negative.    Hematologic/Lymphatic: Negative.    Skin: Negative.    Musculoskeletal:  Positive for joint pain.        Pertinent positives listed in HPI   Gastrointestinal: Negative.    Genitourinary: Negative.    Neurological: Negative.    Psychiatric/Behavioral:  Positive for depression. The patient is nervous/anxious.    Allergic/Immunologic: Negative.        Objective      Vitals:    08/01/23 1303   BP: 128/81   BP Location: Left arm   Patient Position: Sitting   Cuff Size: Adult   Pulse: 100   Weight: 63.5 kg (140 lb)   Height: 160 cm (63\")      BMI is within normal parameters. No other follow-up for BMI required.      Physical Exam  Vitals and nursing note reviewed.   Constitutional:       General: She is not in acute distress.     Appearance: She is not ill-appearing.   HENT:      Head: Normocephalic and atraumatic.      Right Ear: External ear normal.      Left Ear: External ear normal.      Nose: Nose normal. No congestion or rhinorrhea.   Eyes:      Extraocular Movements: Extraocular movements intact.      Conjunctiva/sclera: Conjunctivae normal.      Pupils: Pupils are equal, round, and reactive to light.   Cardiovascular:      Rate and Rhythm: Normal rate.      Pulses: Normal pulses.   Pulmonary:      Effort: Pulmonary effort is normal. No respiratory distress.      Breath sounds: No stridor.   Abdominal:      General: There is no distension.   Musculoskeletal:      Cervical back: Normal range of motion.      Comments: Left shoulder on examination today reveals painful forward flexion 110 degrees.  Abduction 90 " degrees.  Patient has adequate external and internal rotation of side comparable to contralateral shoulder.  Patient has painful Jobes maneuver and strength remains intact.  Lyles and Neer's testing painful.  Speed's Test negative.  Newton's and Yergason's negative.  Neurovascular status grossly intact left upper extremity   Skin:     General: Skin is warm and dry.      Capillary Refill: Capillary refill takes less than 2 seconds.   Neurological:      General: No focal deficit present.      Mental Status: She is alert and oriented to person, place, and time.   Psychiatric:         Mood and Affect: Mood normal.         Behavior: Behavior normal.         Thought Content: Thought content normal.         Judgment: Judgment normal.               Radiology:        XR Shoulder 2+ View Left    Result Date: 8/1/2023    Acromioclavicular joint space degenerative change.  This report was finalized on 8/1/2023 1:28 PM by Dr. Deep Robles MD.           Assessment/Plan        ICD-10-CM ICD-9-CM   1. Tendinitis of left rotator cuff  M75.82 726.10       58-year-old female with notable left rotator cuff tendinitis/subacromial impingement with some concern for AC joint degenerative change.  As result of the patient's pain and symptoms patient received intra-articular subacromial Depo-Medrol injection 80 mg Depo-Medrol with lidocaine block.  Patient tolerated this procedure well.  She was instructed to return back in 3 weeks for further evaluation of the efficacy of the conservative treatment.      Large Joint Arthrocentesis: L subacromial bursa  Date/Time: 8/1/2023 1:34 PM  Consent given by: patient  Site marked: site marked  Supporting Documentation  Indications: pain and diagnostic evaluation   Procedure Details  Location: shoulder - L subacromial bursa  Needle size: 25 G  Approach: lateral  Medications administered: 80 mg methylPREDNISolone acetate 80 MG/ML; 5 mL lidocaine PF 1% 1 %  Patient tolerance: patient tolerated the  procedure well with no immediate complications                    This document was signed by Steve Rayo PA-C August 1, 2023     CC: Chapo Abel MD      Dictated Utilizing Dragon Dictation:   Please note that portions of this note were completed with a voice recognition program.   Part of this note may be an electronic transcription/translation of spoken language to printed text using the Dragon Dictation System.

## 2023-08-15 RX ORDER — METHYLPREDNISOLONE ACETATE 80 MG/ML
80 INJECTION, SUSPENSION INTRA-ARTICULAR; INTRALESIONAL; INTRAMUSCULAR; SOFT TISSUE
Status: COMPLETED | OUTPATIENT
Start: 2023-08-01 | End: 2023-08-01

## 2023-08-15 RX ORDER — LIDOCAINE HYDROCHLORIDE 10 MG/ML
5 INJECTION, SOLUTION EPIDURAL; INFILTRATION; INTRACAUDAL; PERINEURAL
Status: COMPLETED | OUTPATIENT
Start: 2023-08-01 | End: 2023-08-01

## 2023-08-22 ENCOUNTER — OFFICE VISIT (OUTPATIENT)
Dept: ORTHOPEDIC SURGERY | Facility: CLINIC | Age: 58
End: 2023-08-22
Payer: MEDICAID

## 2023-08-22 VITALS — BODY MASS INDEX: 24.8 KG/M2 | HEIGHT: 63 IN | WEIGHT: 140 LBS

## 2023-08-22 DIAGNOSIS — M25.512 LEFT SHOULDER PAIN, UNSPECIFIED CHRONICITY: ICD-10-CM

## 2023-08-22 DIAGNOSIS — M75.82 TENDINITIS OF LEFT ROTATOR CUFF: Primary | ICD-10-CM

## 2023-08-22 PROCEDURE — 99213 OFFICE O/P EST LOW 20 MIN: CPT | Performed by: PHYSICIAN ASSISTANT

## 2023-08-22 NOTE — PROGRESS NOTES
[facility] [place] [facility] New Patient Visit          Patient: Tarsha White  YOB: 1965  Date of Encounter: 08/22/2023  PCP: Chapo Abel MD      Subjective     Chief Complaint   Patient presents with    Left Shoulder - Follow-up, Pain           History of Present Illness:     Tarsha White is a 58 y.o. female presents today as result of left shoulder pain several month history.  Patient has failed conservative treatment options with continuation of pain and symptoms anterior lateral nature worse upon range of motion and abduction.  Patient has difficulty upon repetitive activities.  Patient continues to report no significant alleviation with anti-inflammatory medication and activity modification.  She reports no new complaints.  Denies any paresthesias currently.         Patient Active Problem List   Diagnosis    Allergic conjunctivitis    Atopic rhinitis    Major depressive disorder    Elevated serum alkaline phosphatase level    Fatigue    Hypercalcemia    Hypertension    Hypothyroidism following radioiodine therapy    Pain in joint    Low back pain    Lumbar radiculopathy    Median nerve neuropathy    Spasm    Vitamin D deficiency    Encounter for screening mammogram for malignant neoplasm of breast    Papanicolaou smear for cervical cancer screening    Chronic right shoulder pain     Past Medical History:   Diagnosis Date    Anemia     Hyperlipidemia     Hypertension     Hypothyroidism     Tuberculosis      Past Surgical History:   Procedure Laterality Date    BREAST SURGERY      REDUCTION MAMMAPLASTY Bilateral 2000         TUBAL ABDOMINAL LIGATION       Social History     Occupational History    Not on file   Tobacco Use    Smoking status: Every Day     Packs/day: 1.00     Years: 44.00     Pack years: 44.00     Types: Cigarettes    Smokeless tobacco: Never   Vaping Use    Vaping Use: Never used   Substance and Sexual Activity    Alcohol use: No     Comment: Past    Drug use: No    Sexual  activity: Nancie    Tarsha White  reports that she has been smoking cigarettes. She has a 44.00 pack-year smoking history. She has never used smokeless tobacco.. I have educated her on the risk of diseases from using tobacco products such as cancer, COPD, and heart disease.     I advised her to quit and she is not willing to quit.    I spent 3  minutes counseling the patient.          Social History     Social History Narrative    Not on file     Family History   Problem Relation Age of Onset    COPD Mother     Thyroid disease Mother     Hypertension Mother     Emphysema Father     Hypertension Father     Heart disease Father     Cancer Father     COPD Sister     Hypertension Sister     Breast cancer Sister 50    Hypertension Brother     Heart disease Brother     Cancer Maternal Aunt     Cancer Paternal Uncle      Current Outpatient Medications   Medication Sig Dispense Refill    albuterol sulfate  (90 Base) MCG/ACT inhaler Inhale 2 puffs Every 4 (Four) Hours As Needed for Wheezing. 18 g 11    Aspirin-Salicylamide-Caffeine (BC HEADACHE POWDER PO) Take  by mouth.      atorvastatin (LIPITOR) 10 MG tablet Take 1 tablet by mouth Daily.      Brexpiprazole (Rexulti) 1 MG tablet Take  by mouth.      Cholecalciferol (VITAMIN D) 2000 units tablet Take 1 tablet by mouth Daily.      gabapentin (NEURONTIN) 300 MG capsule Take 1 capsule by mouth 3 (Three) Times a Day. 90 capsule 3    ibuprofen (ADVIL,MOTRIN) 800 MG tablet Take 1 tablet by mouth 2 (Two) Times a Day As Needed for moderate pain (4-6). 60 tablet 1    levocetirizine (XYZAL) 5 MG tablet TAKE ONE TABLET BY MOUTH EVERY NIGHT AT BEDTIME FOR ALLERGIES      levothyroxine (SYNTHROID, LEVOTHROID) 75 MCG tablet Take 1 tablet by mouth Daily.      metoprolol succinate XL (TOPROL-XL) 25 MG 24 hr tablet Take 1 tablet by mouth daily. 90 tablet 1    traZODone (DESYREL) 50 MG tablet TAKE ONE TABLET BY MOUTH EVERY NIGHT AT BEDTIME FOR INSOMNIA      venlafaxine XR (EFFEXOR-XR)  "150 MG 24 hr capsule Take 1 capsule by mouth daily. 90 capsule 1     No current facility-administered medications for this visit.     No Known Allergies         Review of Systems   Constitutional: Negative.   HENT: Negative.     Eyes: Negative.    Cardiovascular: Negative.    Respiratory:  Positive for cough.    Endocrine: Negative.    Hematologic/Lymphatic: Negative.    Skin: Negative.    Musculoskeletal:  Positive for joint pain.        Pertinent positives listed in HPI   Gastrointestinal: Negative.    Genitourinary: Negative.    Neurological: Negative.    Psychiatric/Behavioral:  Positive for depression. The patient is nervous/anxious.    Allergic/Immunologic: Negative.        Objective      Vitals:    08/22/23 0900   Weight: 63.5 kg (140 lb)   Height: 160 cm (63\")      BMI is within normal parameters. No other follow-up for BMI required.      Physical Exam  Vitals and nursing note reviewed.   Constitutional:       General: She is not in acute distress.     Appearance: She is not ill-appearing.   HENT:      Head: Normocephalic and atraumatic.      Right Ear: External ear normal.      Left Ear: External ear normal.      Nose: Nose normal. No congestion or rhinorrhea.   Eyes:      Extraocular Movements: Extraocular movements intact.      Conjunctiva/sclera: Conjunctivae normal.      Pupils: Pupils are equal, round, and reactive to light.   Cardiovascular:      Rate and Rhythm: Normal rate.      Pulses: Normal pulses.   Pulmonary:      Effort: Pulmonary effort is normal. No respiratory distress.      Breath sounds: No stridor.   Abdominal:      General: There is no distension.   Musculoskeletal:      Cervical back: Normal range of motion.      Comments: Left shoulder on examination today reveals painful forward flexion 110 degrees.  Abduction 90 degrees.  Patient has adequate external and internal rotation of side comparable to contralateral shoulder.  Patient has painful Jobes maneuver and strength remains intact.  " Lyles and Neer's testing painful.  Speed's Test negative.  Gerald's and Yergason's negative.  Neurovascular status grossly intact left upper extremity   Skin:     General: Skin is warm and dry.      Capillary Refill: Capillary refill takes less than 2 seconds.   Neurological:      General: No focal deficit present.      Mental Status: She is alert and oriented to person, place, and time.   Psychiatric:         Mood and Affect: Mood normal.         Behavior: Behavior normal.         Thought Content: Thought content normal.         Judgment: Judgment normal.               Radiology:        No radiology results for the last 30 days.        Assessment/Plan        ICD-10-CM ICD-9-CM   1. Tendinitis of left rotator cuff  M75.82 726.10   2. Left shoulder pain, unspecified chronicity  M25.512 719.41     58-year-old female with notable continuation of left shoulder pain concerning for rotator cuff pathology.  Patient also has AC joint degenerative change.  She seemed to be more symptomatic into the subacromial space and region consistent with rotator cuff pathology versus AC joint degenerative osteoarthritis.  As result of the patient's absent alleviation with conservative treatment and recalcitrance to this, the patient was provided with an MRI order diagnostic imaging MRI left shoulder.  The patient will return back upon completion of this for further evaluation.                  This document was signed by Steve Rayo PA-C August 22, 2023    CC: Chapo Abel MD      Dictated Utilizing Dragon Dictation:   Please note that portions of this note were completed with a voice recognition program.   Part of this note may be an electronic transcription/translation of spoken language to printed text using the Dragon Dictation System.

## 2023-09-13 ENCOUNTER — HOSPITAL ENCOUNTER (OUTPATIENT)
Dept: MRI IMAGING | Facility: HOSPITAL | Age: 58
Discharge: HOME | End: 2023-09-13
Admitting: PHYSICIAN ASSISTANT
Payer: MEDICAID

## 2023-09-13 DIAGNOSIS — M75.82 TENDINITIS OF LEFT ROTATOR CUFF: ICD-10-CM

## 2023-09-13 PROCEDURE — 73221 MRI JOINT UPR EXTREM W/O DYE: CPT

## 2023-09-18 ENCOUNTER — OFFICE VISIT (OUTPATIENT)
Dept: ORTHOPEDIC SURGERY | Facility: CLINIC | Age: 58
End: 2023-09-18
Payer: MEDICAID

## 2023-09-18 VITALS — BODY MASS INDEX: 24.8 KG/M2 | WEIGHT: 140 LBS | HEIGHT: 63 IN

## 2023-09-18 DIAGNOSIS — M75.82 TENDINITIS OF LEFT ROTATOR CUFF: Primary | ICD-10-CM

## 2023-09-18 PROCEDURE — 99213 OFFICE O/P EST LOW 20 MIN: CPT | Performed by: PHYSICIAN ASSISTANT

## 2023-09-18 PROCEDURE — 20610 DRAIN/INJ JOINT/BURSA W/O US: CPT | Performed by: PHYSICIAN ASSISTANT

## 2023-09-18 RX ORDER — METHYLPREDNISOLONE ACETATE 80 MG/ML
80 INJECTION, SUSPENSION INTRA-ARTICULAR; INTRALESIONAL; INTRAMUSCULAR; SOFT TISSUE
Status: COMPLETED | OUTPATIENT
Start: 2023-09-18 | End: 2023-09-18

## 2023-09-18 RX ORDER — LIDOCAINE HYDROCHLORIDE 10 MG/ML
5 INJECTION, SOLUTION EPIDURAL; INFILTRATION; INTRACAUDAL; PERINEURAL
Status: COMPLETED | OUTPATIENT
Start: 2023-09-18 | End: 2023-09-18

## 2023-09-18 RX ADMIN — LIDOCAINE HYDROCHLORIDE 5 ML: 10 INJECTION, SOLUTION EPIDURAL; INFILTRATION; INTRACAUDAL; PERINEURAL at 10:51

## 2023-09-18 RX ADMIN — METHYLPREDNISOLONE ACETATE 80 MG: 80 INJECTION, SUSPENSION INTRA-ARTICULAR; INTRALESIONAL; INTRAMUSCULAR; SOFT TISSUE at 10:51

## 2023-09-18 NOTE — PROGRESS NOTES
[facility] [place] [facility] Surgery Established Patient Visit          Patient: Tarsha White  YOB: 1965  Date of Encounter: 08/22/2023  PCP: Chapo Abel MD      Subjective     Chief Complaint   Patient presents with    Left Shoulder - Follow-up, Pain           History of Present Illness:     Tarsha White is a 58 y.o. female presents today as result of left shoulder pain several month history.  Patient has failed conservative treatment options with continuation of pain and symptoms anterior lateral nature worse upon range of motion and abduction.  Patient has difficulty upon repetitive activities.  Patient continues to report no significant alleviation with anti-inflammatory medication and activity modification.  She reports no new complaints.  Denies any paresthesias currently.  She returns today for MRI results review left shoulder.        Patient Active Problem List   Diagnosis    Allergic conjunctivitis    Atopic rhinitis    Major depressive disorder    Elevated serum alkaline phosphatase level    Fatigue    Hypercalcemia    Hypertension    Hypothyroidism following radioiodine therapy    Pain in joint    Low back pain    Lumbar radiculopathy    Median nerve neuropathy    Spasm    Vitamin D deficiency    Encounter for screening mammogram for malignant neoplasm of breast    Papanicolaou smear for cervical cancer screening    Chronic right shoulder pain     Past Medical History:   Diagnosis Date    Anemia     Hyperlipidemia     Hypertension     Hypothyroidism     Tuberculosis      Past Surgical History:   Procedure Laterality Date    BREAST SURGERY      REDUCTION MAMMAPLASTY Bilateral 2000         TUBAL ABDOMINAL LIGATION       Social History     Occupational History    Not on file   Tobacco Use    Smoking status: Every Day     Packs/day: 1.00     Years: 44.00     Pack years: 44.00     Types: Cigarettes    Smokeless tobacco: Never   Vaping Use    Vaping Use: Never used   Substance and Sexual Activity     Alcohol use: No     Comment: Past    Drug use: No    Sexual activity: Defer    Tarsha White  reports that she has been smoking cigarettes. She has a 44.00 pack-year smoking history. She has never used smokeless tobacco.. I have educated her on the risk of diseases from using tobacco products such as cancer, COPD, and heart disease.     I advised her to quit and she is not willing to quit.    I spent 3  minutes counseling the patient.          Social History     Social History Narrative    Not on file     Family History   Problem Relation Age of Onset    COPD Mother     Thyroid disease Mother     Hypertension Mother     Emphysema Father     Hypertension Father     Heart disease Father     Cancer Father     COPD Sister     Hypertension Sister     Breast cancer Sister 50    Hypertension Brother     Heart disease Brother     Cancer Maternal Aunt     Cancer Paternal Uncle      Current Outpatient Medications   Medication Sig Dispense Refill    albuterol sulfate  (90 Base) MCG/ACT inhaler Inhale 2 puffs Every 4 (Four) Hours As Needed for Wheezing. 18 g 11    Aspirin-Salicylamide-Caffeine (BC HEADACHE POWDER PO) Take  by mouth.      atorvastatin (LIPITOR) 10 MG tablet Take 1 tablet by mouth Daily.      Brexpiprazole (Rexulti) 1 MG tablet Take  by mouth.      Cholecalciferol (VITAMIN D) 2000 units tablet Take 1 tablet by mouth Daily.      gabapentin (NEURONTIN) 300 MG capsule Take 1 capsule by mouth 3 (Three) Times a Day. 90 capsule 3    ibuprofen (ADVIL,MOTRIN) 800 MG tablet Take 1 tablet by mouth 2 (Two) Times a Day As Needed for moderate pain (4-6). 60 tablet 1    levocetirizine (XYZAL) 5 MG tablet TAKE ONE TABLET BY MOUTH EVERY NIGHT AT BEDTIME FOR ALLERGIES      levothyroxine (SYNTHROID, LEVOTHROID) 75 MCG tablet Take 1 tablet by mouth Daily.      metoprolol succinate XL (TOPROL-XL) 25 MG 24 hr tablet Take 1 tablet by mouth daily. 90 tablet 1    traZODone (DESYREL) 50 MG tablet TAKE ONE TABLET BY MOUTH EVERY  "NIGHT AT BEDTIME FOR INSOMNIA      venlafaxine XR (EFFEXOR-XR) 150 MG 24 hr capsule Take 1 capsule by mouth daily. 90 capsule 1     No current facility-administered medications for this visit.     No Known Allergies         Review of Systems   Constitutional: Negative.   HENT: Negative.     Eyes: Negative.    Cardiovascular: Negative.    Respiratory:  Positive for cough.    Endocrine: Negative.    Hematologic/Lymphatic: Negative.    Skin: Negative.    Musculoskeletal:  Positive for joint pain.        Pertinent positives listed in HPI   Gastrointestinal: Negative.    Genitourinary: Negative.    Neurological: Negative.    Psychiatric/Behavioral:  Positive for depression. The patient is nervous/anxious.    Allergic/Immunologic: Negative.        Objective      Vitals:    09/18/23 0919   Weight: 63.5 kg (140 lb)   Height: 160 cm (62.99\")      BMI is within normal parameters. No other follow-up for BMI required.      Physical Exam  Vitals and nursing note reviewed.   Constitutional:       General: She is not in acute distress.     Appearance: She is not ill-appearing.   HENT:      Head: Normocephalic and atraumatic.      Right Ear: External ear normal.      Left Ear: External ear normal.      Nose: Nose normal. No congestion or rhinorrhea.   Eyes:      Extraocular Movements: Extraocular movements intact.      Conjunctiva/sclera: Conjunctivae normal.      Pupils: Pupils are equal, round, and reactive to light.   Cardiovascular:      Rate and Rhythm: Normal rate.      Pulses: Normal pulses.   Pulmonary:      Effort: Pulmonary effort is normal. No respiratory distress.      Breath sounds: No stridor.   Abdominal:      General: There is no distension.   Musculoskeletal:      Cervical back: Normal range of motion.      Comments: Left shoulder on examination today reveals painful forward flexion 110 degrees.  Abduction 90 degrees.  Patient has adequate external and internal rotation of side comparable to contralateral shoulder. "  Patient has painful Jobes maneuver and strength remains intact.  Lyles and Neer's testing painful.  Speed's Test negative.  [place]'s and Yergason's negative.  Neurovascular status grossly intact left upper extremity   Skin:     General: Skin is warm and dry.      Capillary Refill: Capillary refill takes less than 2 seconds.   Neurological:      General: No focal deficit present.      Mental Status: She is alert and oriented to person, place, and time.   Psychiatric:         Mood and Affect: Mood normal.         Behavior: Behavior normal.         Thought Content: Thought content normal.         Judgment: Judgment normal.               Radiology:        MRI Shoulder Left Without Contrast    Result Date: 9/13/2023  1.  Partial tearing of the anterior most fibers of the distal supraspinatus tendon. 2.  T2 signal noted within the superior glenoid labrum that may represent chronic tear.  This report was finalized on 9/13/2023 1:01 PM by Dr. Deep Robles MD.           Assessment/Plan        ICD-10-CM ICD-9-CM   1. Tendinitis of left rotator cuff  M75.82 726.10     58-year-old female with notable continuation of left shoulder pain with MRI report evidence of partial tearing anteriormost fibers of the distal supraspinatus tendon and mild signal change noted within the superior glenoid labrum.  Further discussion was had with the patient and there does not appear to be full-thickness rotator cuff tendon tear.  As result the patient was provided with more with subacromial steroid injection 80 mg Depo-Medrol with lidocaine block injected into the subacromial space of the left shoulder.  She was instructed to return back in 3 weeks.  If no significant alleviation we discussed possibility of referral to Dr. Jhoan Muse discuss arthroscopic rotator cuff debridement/repair .      Large Joint Arthrocentesis: L subacromial bursa  Date/Time: 9/18/2023 10:51 AM  Consent given by: patient  Site marked: site marked  Supporting  Documentation  Indications: pain and diagnostic evaluation   Procedure Details  Location: shoulder - L subacromial bursa  Needle size: 25 G  Approach: lateral  Medications administered: 80 mg methylPREDNISolone acetate 80 MG/ML; 5 mL lidocaine PF 1% 1 %  Patient tolerance: patient tolerated the procedure well with no immediate complications              This document was signed by Steve Rayo PA-C September 18, 2023    CC: Chapo Abel MD      Dictated Utilizing Dragon Dictation:   Please note that portions of this note were completed with a voice recognition program.   Part of this note may be an electronic transcription/translation of spoken language to printed text using the Dragon Dictation System.

## 2023-10-17 ENCOUNTER — OFFICE VISIT (OUTPATIENT)
Dept: ORTHOPEDIC SURGERY | Facility: CLINIC | Age: 58
End: 2023-10-17
Payer: MEDICAID

## 2023-10-17 VITALS — WEIGHT: 140 LBS | HEIGHT: 63 IN | BODY MASS INDEX: 24.8 KG/M2

## 2023-10-17 DIAGNOSIS — M25.512 LEFT SHOULDER PAIN, UNSPECIFIED CHRONICITY: ICD-10-CM

## 2023-10-17 DIAGNOSIS — M75.82 TENDINITIS OF LEFT ROTATOR CUFF: Primary | ICD-10-CM

## 2023-10-17 PROCEDURE — 99213 OFFICE O/P EST LOW 20 MIN: CPT | Performed by: PHYSICIAN ASSISTANT

## 2023-10-17 NOTE — PROGRESS NOTES
[facility] [place] [facility] [place] Patient Visit          Patient: Tarsha White  YOB: 1965  Date of Encounter: 10/17/2023  PCP: Chapo Abel MD      Subjective     Chief Complaint   Patient presents with    Left Shoulder - Follow-up           History of Present Illness:     Tarsha White is a 58 y.o. female presents today as result of left shoulder pain several month history. Patient has failed conservative treatment options in the past that prompted MRI that revealed evidence of partial tear restraining of the supraspinatus tendon with no definitive full-thickness tear.  At last office visit the patient received subacromial injection to which she reports near complete resolution of pain and symptoms.  She denies any other new complaints.  She reports full range of motion with no pain upon activity.  Denies any other paresthesias or difficulties.         Patient Active Problem List   Diagnosis    Allergic conjunctivitis    Atopic rhinitis    Major depressive disorder    Elevated serum alkaline phosphatase level    Fatigue    Hypercalcemia    Hypertension    Hypothyroidism following radioiodine therapy    Pain in joint    Low back pain    Lumbar radiculopathy    Median nerve neuropathy    Spasm    Vitamin D deficiency    Encounter for screening mammogram for malignant neoplasm of breast    Papanicolaou smear for cervical cancer screening    Chronic right shoulder pain     Past Medical History:   Diagnosis Date    Anemia     Hyperlipidemia     Hypertension     Hypothyroidism     Tuberculosis      Past Surgical History:   Procedure Laterality Date    BREAST SURGERY      REDUCTION MAMMAPLASTY Bilateral 2000         TUBAL ABDOMINAL LIGATION       Social History     Occupational History    Not on file   Tobacco Use    Smoking status: Every Day     Packs/day: 1.00     Years: 44.00     Additional pack years: 0.00     Total pack years: 44.00     Types: Cigarettes    Smokeless tobacco: Never   Vaping  Use    Vaping Use: Never used   Substance and Sexual Activity    Alcohol use: No     Comment: Past    Drug use: No    Sexual activity: Defer    Tarsha White  reports that she has been smoking cigarettes. She has a 44.00 pack-year smoking history. She has never used smokeless tobacco.. I have educated her on the risk of diseases from using tobacco products such as cancer, COPD, and heart disease.     I advised her to quit and she is not willing to quit.    I spent 3  minutes counseling the patient.          Social History     Social History Narrative    Not on file     Family History   Problem Relation Age of Onset    COPD Mother     Thyroid disease Mother     Hypertension Mother     Emphysema Father     Hypertension Father     Heart disease Father     Cancer Father     COPD Sister     Hypertension Sister     Breast cancer Sister 50    Hypertension Brother     Heart disease Brother     Cancer Maternal Aunt     Cancer Paternal Uncle      Current Outpatient Medications   Medication Sig Dispense Refill    albuterol sulfate  (90 Base) MCG/ACT inhaler Inhale 2 puffs Every 4 (Four) Hours As Needed for Wheezing. 18 g 11    Aspirin-Salicylamide-Caffeine (BC HEADACHE POWDER PO) Take  by mouth.      atorvastatin (LIPITOR) 10 MG tablet Take 1 tablet by mouth Daily.      Brexpiprazole (Rexulti) 1 MG tablet Take  by mouth.      Cholecalciferol (VITAMIN D) 2000 units tablet Take 1 tablet by mouth Daily.      gabapentin (NEURONTIN) 300 MG capsule Take 1 capsule by mouth 3 (Three) Times a Day. 90 capsule 3    ibuprofen (ADVIL,MOTRIN) 800 MG tablet Take 1 tablet by mouth 2 (Two) Times a Day As Needed for moderate pain (4-6). 60 tablet 1    levocetirizine (XYZAL) 5 MG tablet TAKE ONE TABLET BY MOUTH EVERY NIGHT AT BEDTIME FOR ALLERGIES      levothyroxine (SYNTHROID, LEVOTHROID) 75 MCG tablet Take 1 tablet by mouth Daily.      metoprolol succinate XL (TOPROL-XL) 25 MG 24 hr tablet Take 1 tablet by mouth daily. 90 tablet 1     "traZODone (DESYREL) 50 MG tablet TAKE ONE TABLET BY MOUTH EVERY NIGHT AT BEDTIME FOR INSOMNIA      venlafaxine XR (EFFEXOR-XR) 150 MG 24 hr capsule Take 1 capsule by mouth daily. 90 capsule 1     No current facility-administered medications for this visit.     No Known Allergies         Review of Systems   Constitutional: Negative.   HENT: Negative.     Eyes: Negative.    Cardiovascular: Negative.    Respiratory:  Positive for cough.    Endocrine: Negative.    Hematologic/Lymphatic: Negative.    Skin: Negative.    Musculoskeletal:  Positive for joint pain.        Pertinent positives listed in HPI   Gastrointestinal: Negative.    Genitourinary: Negative.    Neurological: Negative.    Psychiatric/Behavioral:  Positive for depression. The patient is nervous/anxious.    Allergic/Immunologic: Negative.          Objective      Vitals:    10/17/23 0850   Weight: 63.5 kg (140 lb)   Height: 160 cm (63\")      BMI is within normal parameters. No other follow-up for BMI required.      Physical Exam  Vitals and nursing note reviewed.   Constitutional:       General: She is not in acute distress.     Appearance: She is not ill-appearing.   HENT:      Head: Normocephalic and atraumatic.      Right Ear: External ear normal.      Left Ear: External ear normal.      Nose: Nose normal. No congestion or rhinorrhea.   Eyes:      Extraocular Movements: Extraocular movements intact.      Conjunctiva/sclera: Conjunctivae normal.      Pupils: Pupils are equal, round, and reactive to light.   Cardiovascular:      Rate and Rhythm: Normal rate.      Pulses: Normal pulses.   Pulmonary:      Effort: Pulmonary effort is normal. No respiratory distress.      Breath sounds: No stridor.   Abdominal:      General: There is no distension.   Musculoskeletal:      Cervical back: Normal range of motion.      Comments: Left shoulder on examination today reveals  forward flexion 160 degrees.  Abduction 150 degrees.  Patient has adequate external and " internal rotation of side comparable to contralateral shoulder.  Patient has now non painful Jobes maneuver and strength remains intact.  Lyles and Neer's testing non painful.  Speed's Test negative.  [place]'s and Yergason's negative.  Neurovascular status grossly intact left upper extremity   Skin:     General: Skin is warm and dry.      Capillary Refill: Capillary refill takes less than 2 seconds.   Neurological:      General: No focal deficit present.      Mental Status: She is alert and oriented to person, place, and time.   Psychiatric:         Mood and Affect: Mood normal.         Behavior: Behavior normal.         Thought Content: Thought content normal.         Judgment: Judgment normal.                 Radiology:        MRI Shoulder Left Without Contrast    Result Date: 9/13/2023  1.  Partial tearing of the anterior most fibers of the distal supraspinatus tendon. 2.  T2 signal noted within the superior glenoid labrum that may represent chronic tear.  This report was finalized on 9/13/2023 1:01 PM by Dr. Deep Robles MD.      XR Shoulder 2+ View Left    Result Date: 8/1/2023    Acromioclavicular joint space degenerative change.  This report was finalized on 8/1/2023 1:28 PM by Dr. Deep Robles MD.             Assessment/Plan        ICD-10-CM ICD-9-CM   1. Tendinitis of left rotator cuff  M75.82 726.10   2. Left shoulder pain, unspecified chronicity  M25.512 719.41       58-year-old female with notable continuation of left shoulder pain with MRI report evidence of partial tearing anteriormost fibers of the distal supraspinatus tendon and mild signal change noted within the superior glenoid labrum.  Further discussion was had with the patient and there does not appear to be full-thickness rotator cuff tendon tear.  As result of the patient's success and alleviation of pain symptoms with the most recent injection the patient will continue with her daily activities and progression into this with relative  rest and activity modification as needed.  Her pain and swelling are to be her guide.  She will return back upon any complication or worsening of pain/symptoms.                    This document was signed by Steve Rayo PA-C October 17, 2023    CC: Chapo Abel MD      Dictated Utilizing Dragon Dictation:   Please note that portions of this note were completed with a voice recognition program.   Part of this note may be an electronic transcription/translation of spoken language to printed text using the Dragon Dictation System.

## 2024-03-25 ENCOUNTER — TRANSCRIBE ORDERS (OUTPATIENT)
Dept: ADMINISTRATIVE | Facility: HOSPITAL | Age: 59
End: 2024-03-25
Payer: MEDICAID

## 2024-03-25 DIAGNOSIS — F17.210 CIGARETTE SMOKER: Primary | ICD-10-CM

## 2024-04-11 ENCOUNTER — HOSPITAL ENCOUNTER (OUTPATIENT)
Dept: CT IMAGING | Facility: HOSPITAL | Age: 59
Discharge: HOME | End: 2024-04-11
Payer: MEDICAID

## 2024-04-11 DIAGNOSIS — F17.210 CIGARETTE SMOKER: ICD-10-CM

## 2024-04-11 PROCEDURE — 71271 CT THORAX LUNG CANCER SCR C-: CPT

## 2024-06-06 ENCOUNTER — OFFICE VISIT (OUTPATIENT)
Dept: ORTHOPEDIC SURGERY | Facility: CLINIC | Age: 59
End: 2024-06-06
Payer: MEDICAID

## 2024-06-06 VITALS — BODY MASS INDEX: 24.8 KG/M2 | WEIGHT: 140 LBS | HEIGHT: 63 IN

## 2024-06-06 DIAGNOSIS — M75.42 IMPINGEMENT SYNDROME OF LEFT SHOULDER: Primary | ICD-10-CM

## 2024-06-06 RX ORDER — GABAPENTIN 600 MG/1
600 TABLET ORAL 3 TIMES DAILY
COMMUNITY
Start: 2024-05-09

## 2024-06-06 RX ORDER — ATORVASTATIN CALCIUM 40 MG/1
40 TABLET, FILM COATED ORAL NIGHTLY
COMMUNITY
Start: 2024-05-09

## 2024-06-06 RX ORDER — LEVOTHYROXINE SODIUM 0.05 MG/1
50 TABLET ORAL DAILY
COMMUNITY
Start: 2024-05-09

## 2024-06-06 RX ORDER — VENLAFAXINE HYDROCHLORIDE 75 MG/1
225 CAPSULE, EXTENDED RELEASE ORAL DAILY
COMMUNITY
Start: 2024-05-09

## 2024-06-06 RX ADMIN — METHYLPREDNISOLONE ACETATE 80 MG: 80 INJECTION, SUSPENSION INTRA-ARTICULAR; INTRALESIONAL; INTRAMUSCULAR; SOFT TISSUE at 15:41

## 2024-06-06 RX ADMIN — LIDOCAINE HYDROCHLORIDE 5 ML: 10 INJECTION, SOLUTION EPIDURAL; INFILTRATION; INTRACAUDAL; PERINEURAL at 15:41

## 2024-06-08 RX ORDER — LIDOCAINE HYDROCHLORIDE 10 MG/ML
5 INJECTION, SOLUTION EPIDURAL; INFILTRATION; INTRACAUDAL; PERINEURAL
Status: COMPLETED | OUTPATIENT
Start: 2024-06-06 | End: 2024-06-06

## 2024-06-08 RX ORDER — METHYLPREDNISOLONE ACETATE 80 MG/ML
80 INJECTION, SUSPENSION INTRA-ARTICULAR; INTRALESIONAL; INTRAMUSCULAR; SOFT TISSUE
Status: COMPLETED | OUTPATIENT
Start: 2024-06-06 | End: 2024-06-06

## 2024-06-08 NOTE — PROGRESS NOTES
[facility] Orthopaedic Surgery Established Patient Visit          Patient: Tarsha White  YOB: 1965  Date of Encounter: 6/6/2024  PCP: Chapo Abel MD      Subjective     Chief Complaint   Patient presents with    Left Shoulder - Follow-up, Pain           History of Present Illness:     Tarsha White is a 58 y.o. female presents today as result of left shoulder pain.  Once again the patient did have previous MRI that revealed partial supraspinatus tendon strain with no full-thickness tear.  The patient responded to subacromial injection last office visit with near complete resolution of pain symptoms.  She only most recently began to have pain and return of symptoms.      Patient Active Problem List   Diagnosis    Allergic conjunctivitis    Atopic rhinitis    Major depressive disorder    Elevated serum alkaline phosphatase level    Fatigue    Hypercalcemia    Hypertension    Hypothyroidism following radioiodine therapy    Pain in joint    Low back pain    Lumbar radiculopathy    Median nerve neuropathy    Spasm    Vitamin D deficiency    Encounter for screening mammogram for malignant neoplasm of breast    Papanicolaou smear for cervical cancer screening    Chronic right shoulder pain     Past Medical History:   Diagnosis Date    Anemia     Hyperlipidemia     Hypertension     Hypothyroidism     Tuberculosis      Past Surgical History:   Procedure Laterality Date    BREAST SURGERY      REDUCTION MAMMAPLASTY Bilateral 2000         TUBAL ABDOMINAL LIGATION       Social History     Occupational History    Not on file   Tobacco Use    Smoking status: Every Day     Current packs/day: 1.00     Average packs/day: 1 pack/day for 44.0 years (44.0 ttl pk-yrs)     Types: Cigarettes    Smokeless tobacco: Never   Vaping Use    Vaping status: Never Used   Substance and Sexual Activity    Alcohol use: No     Comment: Past    Drug use: No    Sexual activity: Defer    Tarsha White  reports that she has been smoking  cigarettes. She has a 44 pack-year smoking history. She has never used smokeless tobacco.. I have educated her on the risk of diseases from using tobacco products such as cancer, COPD, and heart disease.             Social History     Social History Narrative    Not on file     Family History   Problem Relation Age of Onset    COPD Mother     Thyroid disease Mother     Hypertension Mother     Emphysema Father     Hypertension Father     Heart disease Father     Cancer Father     COPD Sister     Hypertension Sister     Breast cancer Sister 50    Hypertension Brother     Heart disease Brother     Cancer Maternal Aunt     Cancer Paternal Uncle      Current Outpatient Medications   Medication Sig Dispense Refill    albuterol sulfate  (90 Base) MCG/ACT inhaler Inhale 2 puffs Every 4 (Four) Hours As Needed for Wheezing. 18 g 11    Aspirin-Salicylamide-Caffeine (BC HEADACHE POWDER PO) Take  by mouth.      atorvastatin (LIPITOR) 40 MG tablet Take 1 tablet by mouth Every Night.      Brexpiprazole (Rexulti) 1 MG tablet Take  by mouth.      Cholecalciferol (VITAMIN D) 2000 units tablet Take 1 tablet by mouth Daily.      gabapentin (NEURONTIN) 600 MG tablet Take 1 tablet by mouth 3 (Three) Times a Day.      ibuprofen (ADVIL,MOTRIN) 800 MG tablet Take 1 tablet by mouth 2 (Two) Times a Day As Needed for moderate pain (4-6). 60 tablet 1    levocetirizine (XYZAL) 5 MG tablet TAKE ONE TABLET BY MOUTH EVERY NIGHT AT BEDTIME FOR ALLERGIES      levothyroxine (SYNTHROID, LEVOTHROID) 50 MCG tablet Take 1 tablet by mouth Daily.      levothyroxine (SYNTHROID, LEVOTHROID) 75 MCG tablet Take 1 tablet by mouth Daily.      metoprolol succinate XL (TOPROL-XL) 25 MG 24 hr tablet Take 1 tablet by mouth daily. 90 tablet 1    traZODone (DESYREL) 50 MG tablet TAKE ONE TABLET BY MOUTH EVERY NIGHT AT BEDTIME FOR INSOMNIA      venlafaxine XR (EFFEXOR-XR) 75 MG 24 hr capsule Take 3 capsules by mouth Daily.       No current facility-administered  "medications for this visit.     No Known Allergies         Review of Systems   Constitutional: Negative.   HENT: Negative.     Eyes: Negative.    Cardiovascular: Negative.    Respiratory:  Positive for cough.    Endocrine: Negative.    Hematologic/Lymphatic: Negative.    Skin: Negative.    Musculoskeletal:  Positive for joint pain.        Pertinent positives listed in HPI   Gastrointestinal: Negative.    Genitourinary: Negative.    Neurological: Negative.    Psychiatric/Behavioral:  Positive for depression. The patient is nervous/anxious.    Allergic/Immunologic: Negative.          Objective      Vitals:    06/06/24 1031   Weight: 63.5 kg (140 lb)   Height: 160 cm (63\")   PainSc:   8   PainLoc: Shoulder      BMI is within normal parameters. No other follow-up for BMI required.      Physical Exam  Vitals and nursing note reviewed.   Constitutional:       General: She is not in acute distress.     Appearance: She is not ill-appearing.   HENT:      Head: Normocephalic and atraumatic.      Right Ear: External ear normal.      Left Ear: External ear normal.      Nose: Nose normal. No congestion or rhinorrhea.   Eyes:      Extraocular Movements: Extraocular movements intact.      Conjunctiva/sclera: Conjunctivae normal.      Pupils: Pupils are equal, round, and reactive to light.   Cardiovascular:      Rate and Rhythm: Normal rate.      Pulses: Normal pulses.   Pulmonary:      Effort: Pulmonary effort is normal. No respiratory distress.      Breath sounds: No stridor.   Abdominal:      General: There is no distension.   Musculoskeletal:      Cervical back: Normal range of motion.      Comments: Left shoulder on examination today reveals  forward flexion 110 degrees.  Abduction 90 degrees.  Patient has adequate external and internal rotation of side comparable to contralateral shoulder.  Patient has  painful Jobes maneuver and strength remains intact.  Lyles and Neer's testing painful.  Speed's Test negative.  Florence's " and Yergason's negative.  Neurovascular status grossly intact left upper extremity   Skin:     General: Skin is warm and dry.      Capillary Refill: Capillary refill takes less than 2 seconds.   Neurological:      General: No focal deficit present.      Mental Status: She is alert and oriented to person, place, and time.   Psychiatric:         Mood and Affect: Mood normal.         Behavior: Behavior normal.         Thought Content: Thought content normal.         Judgment: Judgment normal.                 Radiology:        CT Chest Low Dose Cancer Screening WO    Result Date: 4/11/2024    Stable exam with no suspicious pulmonary nodules.  ASSESSMENT:  Lung-RADS score: 1 - Negative.  Recommend continued annual screening with a low-dose CT (LDCT) in 12 months.   This report was finalized on 4/11/2024 10:01 AM by Dr. Danish Powell MD.             Assessment/Plan      No diagnosis found.      58-year-old female with notable return of left shoulder pain with MRI report evidence of partial tearing anteriormost fibers of the distal supraspinatus tendon and mild signal change noted within the superior glenoid labrum.  Further discussion was had with the patient and there does not appear to be full-thickness rotator cuff tendon tear.  As result of the patient's success and alleviation of pain symptoms with the most recent injection the patient was provided with a repeat subacromial injection 80 mg Depo-Medrol with lidocaine block injected into the subacromial space left shoulder.  Patient tolerated this procedure well.  She was instructed to return back in 4 weeks for further evaluation of the efficacy of the conservative treatment.  \      Large Joint Arthrocentesis: L subacromial bursa  Date/Time: 6/6/2024 3:41 PM  Consent given by: patient  Site marked: site marked  Supporting Documentation  Indications: pain and diagnostic evaluation   Procedure Details  Location: shoulder - L subacromial bursa  Needle size: 25  G  Approach: lateral  Medications administered: 80 mg methylPREDNISolone acetate 80 MG/ML; 5 mL lidocaine PF 1% 1 %  Patient tolerance: patient tolerated the procedure well with no immediate complications               This document was signed by Steve Rayo PA-C June 6, 2024    CC: Chapo Abel MD      Dictated Utilizing Dragon Dictation:   Please note that portions of this note were completed with a voice recognition program.   Part of this note may be an electronic transcription/translation of spoken language to printed text using the Dragon Dictation System.

## 2024-08-19 ENCOUNTER — HOSPITAL ENCOUNTER (EMERGENCY)
Facility: HOSPITAL | Age: 59
Discharge: HOME | End: 2024-08-19
Attending: STUDENT IN AN ORGANIZED HEALTH CARE EDUCATION/TRAINING PROGRAM | Admitting: STUDENT IN AN ORGANIZED HEALTH CARE EDUCATION/TRAINING PROGRAM
Payer: MEDICAID

## 2024-08-19 VITALS
OXYGEN SATURATION: 97 % | WEIGHT: 140 LBS | SYSTOLIC BLOOD PRESSURE: 176 MMHG | BODY MASS INDEX: 24.8 KG/M2 | RESPIRATION RATE: 17 BRPM | TEMPERATURE: 98.2 F | DIASTOLIC BLOOD PRESSURE: 86 MMHG | HEIGHT: 63 IN | HEART RATE: 75 BPM

## 2024-08-19 DIAGNOSIS — F32.A DEPRESSION, UNSPECIFIED DEPRESSION TYPE: Primary | ICD-10-CM

## 2024-08-19 DIAGNOSIS — F41.9 ANXIETY: ICD-10-CM

## 2024-08-19 LAB
ALBUMIN SERPL-MCNC: 4.4 G/DL (ref 3.5–5.2)
ALBUMIN/GLOB SERPL: 1.6 G/DL
ALP SERPL-CCNC: 96 U/L (ref 39–117)
ALT SERPL W P-5'-P-CCNC: 17 U/L (ref 1–33)
AMPHET+METHAMPHET UR QL: NEGATIVE
AMPHETAMINES UR QL: NEGATIVE
ANION GAP SERPL CALCULATED.3IONS-SCNC: 12 MMOL/L (ref 5–15)
AST SERPL-CCNC: 27 U/L (ref 1–32)
BACTERIA UR QL AUTO: ABNORMAL /HPF
BARBITURATES UR QL SCN: NEGATIVE
BASOPHILS # BLD AUTO: 0.05 10*3/MM3 (ref 0–0.2)
BASOPHILS NFR BLD AUTO: 0.5 % (ref 0–1.5)
BENZODIAZ UR QL SCN: NEGATIVE
BILIRUB SERPL-MCNC: 0.2 MG/DL (ref 0–1.2)
BILIRUB UR QL STRIP: NEGATIVE
BUN SERPL-MCNC: 6 MG/DL (ref 8–23)
BUN/CREAT SERPL: 7.3 (ref 7–25)
BUPRENORPHINE SERPL-MCNC: NEGATIVE NG/ML
CALCIUM SPEC-SCNC: 9.3 MG/DL (ref 8.6–10.5)
CANNABINOIDS SERPL QL: POSITIVE
CHLORIDE SERPL-SCNC: 102 MMOL/L (ref 98–107)
CLARITY UR: CLEAR
CO2 SERPL-SCNC: 22 MMOL/L (ref 22–29)
COCAINE UR QL: NEGATIVE
COLOR UR: YELLOW
CREAT SERPL-MCNC: 0.82 MG/DL (ref 0.57–1)
DEPRECATED RDW RBC AUTO: 44.7 FL (ref 37–54)
EGFRCR SERPLBLD CKD-EPI 2021: 82 ML/MIN/1.73
EOSINOPHIL # BLD AUTO: 0.15 10*3/MM3 (ref 0–0.4)
EOSINOPHIL NFR BLD AUTO: 1.4 % (ref 0.3–6.2)
ERYTHROCYTE [DISTWIDTH] IN BLOOD BY AUTOMATED COUNT: 13.5 % (ref 12.3–15.4)
ETHANOL BLD-MCNC: <10 MG/DL (ref 0–10)
ETHANOL UR QL: <0.01 %
FENTANYL UR-MCNC: NEGATIVE NG/ML
GLOBULIN UR ELPH-MCNC: 2.7 GM/DL
GLUCOSE SERPL-MCNC: 108 MG/DL (ref 65–99)
GLUCOSE UR STRIP-MCNC: NEGATIVE MG/DL
HCT VFR BLD AUTO: 46.7 % (ref 34–46.6)
HGB BLD-MCNC: 15.8 G/DL (ref 12–15.9)
HGB UR QL STRIP.AUTO: ABNORMAL
HOLD SPECIMEN: NORMAL
HOLD SPECIMEN: NORMAL
HYALINE CASTS UR QL AUTO: ABNORMAL /LPF
IMM GRANULOCYTES # BLD AUTO: 0.04 10*3/MM3 (ref 0–0.05)
IMM GRANULOCYTES NFR BLD AUTO: 0.4 % (ref 0–0.5)
KETONES UR QL STRIP: ABNORMAL
LEUKOCYTE ESTERASE UR QL STRIP.AUTO: NEGATIVE
LIPASE SERPL-CCNC: 36 U/L (ref 13–60)
LYMPHOCYTES # BLD AUTO: 2.25 10*3/MM3 (ref 0.7–3.1)
LYMPHOCYTES NFR BLD AUTO: 20.5 % (ref 19.6–45.3)
MAGNESIUM SERPL-MCNC: 2 MG/DL (ref 1.6–2.4)
MCH RBC QN AUTO: 30.3 PG (ref 26.6–33)
MCHC RBC AUTO-ENTMCNC: 33.8 G/DL (ref 31.5–35.7)
MCV RBC AUTO: 89.6 FL (ref 79–97)
METHADONE UR QL SCN: NEGATIVE
MONOCYTES # BLD AUTO: 0.7 10*3/MM3 (ref 0.1–0.9)
MONOCYTES NFR BLD AUTO: 6.4 % (ref 5–12)
NEUTROPHILS NFR BLD AUTO: 7.76 10*3/MM3 (ref 1.7–7)
NEUTROPHILS NFR BLD AUTO: 70.8 % (ref 42.7–76)
NITRITE UR QL STRIP: NEGATIVE
NRBC BLD AUTO-RTO: 0 /100 WBC (ref 0–0.2)
OPIATES UR QL: NEGATIVE
OXYCODONE UR QL SCN: NEGATIVE
PCP UR QL SCN: NEGATIVE
PH UR STRIP.AUTO: 6 [PH] (ref 5–8)
PLATELET # BLD AUTO: 228 10*3/MM3 (ref 140–450)
PMV BLD AUTO: 9.8 FL (ref 6–12)
POTASSIUM SERPL-SCNC: 4 MMOL/L (ref 3.5–5.2)
PROT SERPL-MCNC: 7.1 G/DL (ref 6–8.5)
PROT UR QL STRIP: NEGATIVE
RBC # BLD AUTO: 5.21 10*6/MM3 (ref 3.77–5.28)
RBC # UR STRIP: ABNORMAL /HPF
REF LAB TEST METHOD: ABNORMAL
SODIUM SERPL-SCNC: 136 MMOL/L (ref 136–145)
SP GR UR STRIP: 1.01 (ref 1–1.03)
SQUAMOUS #/AREA URNS HPF: ABNORMAL /HPF
TRICYCLICS UR QL SCN: NEGATIVE
TSH SERPL DL<=0.05 MIU/L-ACNC: 2.67 UIU/ML (ref 0.27–4.2)
UROBILINOGEN UR QL STRIP: ABNORMAL
WBC # UR STRIP: ABNORMAL /HPF
WBC NRBC COR # BLD AUTO: 10.95 10*3/MM3 (ref 3.4–10.8)
WHOLE BLOOD HOLD COAG: NORMAL
WHOLE BLOOD HOLD SPECIMEN: NORMAL

## 2024-08-19 PROCEDURE — 80053 COMPREHEN METABOLIC PANEL: CPT | Performed by: PHYSICIAN ASSISTANT

## 2024-08-19 PROCEDURE — 99284 EMERGENCY DEPT VISIT MOD MDM: CPT

## 2024-08-19 PROCEDURE — 83690 ASSAY OF LIPASE: CPT | Performed by: PHYSICIAN ASSISTANT

## 2024-08-19 PROCEDURE — 84443 ASSAY THYROID STIM HORMONE: CPT | Performed by: PHYSICIAN ASSISTANT

## 2024-08-19 PROCEDURE — 83735 ASSAY OF MAGNESIUM: CPT | Performed by: PHYSICIAN ASSISTANT

## 2024-08-19 PROCEDURE — 85025 COMPLETE CBC W/AUTO DIFF WBC: CPT | Performed by: PHYSICIAN ASSISTANT

## 2024-08-19 PROCEDURE — 82077 ASSAY SPEC XCP UR&BREATH IA: CPT | Performed by: PHYSICIAN ASSISTANT

## 2024-08-19 PROCEDURE — 36415 COLL VENOUS BLD VENIPUNCTURE: CPT | Performed by: PHYSICIAN ASSISTANT

## 2024-08-19 PROCEDURE — 80307 DRUG TEST PRSMV CHEM ANLYZR: CPT | Performed by: PHYSICIAN ASSISTANT

## 2024-08-19 PROCEDURE — 25810000003 SODIUM CHLORIDE 0.9 % SOLUTION: Performed by: PHYSICIAN ASSISTANT

## 2024-08-19 PROCEDURE — 81001 URINALYSIS AUTO W/SCOPE: CPT | Performed by: PHYSICIAN ASSISTANT

## 2024-08-19 RX ORDER — SODIUM CHLORIDE 0.9 % (FLUSH) 0.9 %
10 SYRINGE (ML) INJECTION AS NEEDED
Status: DISCONTINUED | OUTPATIENT
Start: 2024-08-19 | End: 2024-08-19 | Stop reason: HOSPADM

## 2024-08-19 RX ADMIN — SODIUM CHLORIDE 1000 ML: 9 INJECTION, SOLUTION INTRAVENOUS at 12:19

## 2024-08-19 NOTE — NURSING NOTE
"Patient reports she is here because her \"nerves are bad.\" Patient reports she thinks she had a nervous breakdown a few days ago. She denies si, hi, and avh. She rates anxiety 7/10 and depression 4/10.  "

## 2024-08-19 NOTE — NURSING NOTE
Presented pt to Dr. Cowan. He feels patient does not meet admission criteria at this time. New order to dc pt with outpatient resources. [id number].

## 2024-08-19 NOTE — ED PROVIDER NOTES
Subjective   History of Present Illness  59-year-old female presents secondary to anxiety, decreased appetite.  She states that she feels dehydrated.  She states that she has been very depressed and anxious.  She denies any suicidal homicidal ideation.  She denies any AV hallucinations.  She has a past medical history of anemia, hyperlipidemia, hypertension, hypothyroidism.  She presents by private vehicle.      Review of Systems   Constitutional: Negative.  Negative for fever.   HENT: Negative.     Respiratory: Negative.     Cardiovascular: Negative.  Negative for chest pain.   Gastrointestinal: Negative.  Negative for abdominal pain.   Endocrine: Negative.    Genitourinary: Negative.  Negative for dysuria.   Skin: Negative.    Neurological: Negative.    Psychiatric/Behavioral: Negative.     All other systems reviewed and are negative.      Past Medical History:   Diagnosis Date    Anemia     Hyperlipidemia     Hypertension     Hypothyroidism     Tuberculosis        No Known Allergies    Past Surgical History:   Procedure Laterality Date    BREAST SURGERY      REDUCTION MAMMAPLASTY Bilateral 2000         TUBAL ABDOMINAL LIGATION         Family History   Problem Relation Age of Onset    COPD Mother     Thyroid disease Mother     Hypertension Mother     Emphysema Father     Hypertension Father     Heart disease Father     Cancer Father     COPD Sister     Hypertension Sister     Breast cancer Sister 50    Hypertension Brother     Heart disease Brother     Cancer Maternal Aunt     Cancer Paternal Uncle        Social History     Socioeconomic History    Marital status:    Tobacco Use    Smoking status: Every Day     Current packs/day: 1.00     Average packs/day: 1 pack/day for 44.0 years (44.0 ttl pk-yrs)     Types: Cigarettes    Smokeless tobacco: Never   Vaping Use    Vaping status: Never Used   Substance and Sexual Activity    Alcohol use: No     Comment: Past    Drug use: No    Sexual activity: Defer            Objective   Physical Exam  Vitals and nursing note reviewed.   Constitutional:       General: She is not in acute distress.     Appearance: She is well-developed. She is not diaphoretic.   HENT:      Head: Normocephalic and atraumatic.      Right Ear: External ear normal.      Left Ear: External ear normal.      Nose: Nose normal.   Eyes:      Conjunctiva/sclera: Conjunctivae normal.      Pupils: Pupils are equal, round, and reactive to light.   Neck:      Vascular: No JVD.      Trachea: No tracheal deviation.   Cardiovascular:      Rate and Rhythm: Normal rate and regular rhythm.      Heart sounds: Normal heart sounds. No murmur heard.  Pulmonary:      Effort: Pulmonary effort is normal. No respiratory distress.      Breath sounds: Normal breath sounds. No wheezing.   Abdominal:      General: Bowel sounds are normal.      Palpations: Abdomen is soft.      Tenderness: There is no abdominal tenderness.   Musculoskeletal:         General: No deformity. Normal range of motion.      Cervical back: Normal range of motion and neck supple.   Skin:     General: Skin is warm and dry.      Coloration: Skin is not pale.      Findings: No erythema or rash.   Neurological:      Mental Status: She is alert and oriented to person, place, and time.      Cranial Nerves: No cranial nerve deficit.   Psychiatric:         Behavior: Behavior normal.         Thought Content: Thought content normal.         Procedures           ED Course                                             Medical Decision Making  59-year-old female presents secondary to anxiety, decreased appetite.  She states that she feels dehydrated.  She states that she has been very depressed and anxious.  She denies any suicidal homicidal ideation.  She denies any AV hallucinations.  She has a past medical history of anemia, hyperlipidemia, hypertension, hypothyroidism.  She presents by private vehicle.    Problems Addressed:  Anxiety: complicated acute illness or  injury  Depression, unspecified depression type: complicated acute illness or injury    Amount and/or Complexity of Data Reviewed  Labs: ordered. Decision-making details documented in ED Course.  Discussion of management or test interpretation with external provider(s): On-call psychiatrist for the intake nurse.    Risk  Prescription drug management.        Final diagnoses:   Depression, unspecified depression type   Anxiety       ED Disposition  ED Disposition       ED Disposition   Discharge    Condition   Stable    Comment   --               Chapo Abel MD  [address] [id number] [phone]    Schedule an appointment as soon as possible for a visit            Medication List      No changes were made to your prescriptions during this visit.            Eduardo Figueroa PA  08/19/24 2695

## 2024-09-05 ENCOUNTER — HOSPITAL ENCOUNTER (EMERGENCY)
Facility: HOSPITAL | Age: 59
Discharge: PSYCHIATRIC OR SUBSTANCE USE FACILITY | DRG: 918 | End: 2024-09-06
Attending: STUDENT IN AN ORGANIZED HEALTH CARE EDUCATION/TRAINING PROGRAM
Payer: MEDICAID

## 2024-09-05 DIAGNOSIS — T43.8X2A: Primary | ICD-10-CM

## 2024-09-05 LAB
ALBUMIN SERPL-MCNC: 3.9 G/DL (ref 3.5–5.2)
ALBUMIN/GLOB SERPL: 1.6 G/DL
ALP SERPL-CCNC: 80 U/L (ref 39–117)
ALT SERPL W P-5'-P-CCNC: 14 U/L (ref 1–33)
AMPHET+METHAMPHET UR QL: NEGATIVE
AMPHETAMINES UR QL: NEGATIVE
ANION GAP SERPL CALCULATED.3IONS-SCNC: 12.2 MMOL/L (ref 5–15)
APAP SERPL-MCNC: <5 MCG/ML (ref 0–30)
AST SERPL-CCNC: 20 U/L (ref 1–32)
BARBITURATES UR QL SCN: NEGATIVE
BASOPHILS # BLD AUTO: 0.05 10*3/MM3 (ref 0–0.2)
BASOPHILS NFR BLD AUTO: 0.4 % (ref 0–1.5)
BENZODIAZ UR QL SCN: POSITIVE
BILIRUB SERPL-MCNC: 0.2 MG/DL (ref 0–1.2)
BUN SERPL-MCNC: 15 MG/DL (ref 8–23)
BUN/CREAT SERPL: 13 (ref 7–25)
BUPRENORPHINE SERPL-MCNC: NEGATIVE NG/ML
CALCIUM SPEC-SCNC: 8.8 MG/DL (ref 8.6–10.5)
CANNABINOIDS SERPL QL: POSITIVE
CHLORIDE SERPL-SCNC: 103 MMOL/L (ref 98–107)
CO2 SERPL-SCNC: 20.8 MMOL/L (ref 22–29)
COCAINE UR QL: POSITIVE
CREAT SERPL-MCNC: 1.15 MG/DL (ref 0.57–1)
DEPRECATED RDW RBC AUTO: 45.8 FL (ref 37–54)
EGFRCR SERPLBLD CKD-EPI 2021: 54.6 ML/MIN/1.73
EOSINOPHIL # BLD AUTO: 0.01 10*3/MM3 (ref 0–0.4)
EOSINOPHIL NFR BLD AUTO: 0.1 % (ref 0.3–6.2)
ERYTHROCYTE [DISTWIDTH] IN BLOOD BY AUTOMATED COUNT: 13.4 % (ref 12.3–15.4)
ETHANOL BLD-MCNC: <10 MG/DL (ref 0–10)
ETHANOL UR QL: <0.01 %
FENTANYL UR-MCNC: NEGATIVE NG/ML
GLOBULIN UR ELPH-MCNC: 2.4 GM/DL
GLUCOSE BLDC GLUCOMTR-MCNC: 118 MG/DL (ref 70–130)
GLUCOSE SERPL-MCNC: 110 MG/DL (ref 65–99)
HCT VFR BLD AUTO: 37.9 % (ref 34–46.6)
HGB BLD-MCNC: 12.5 G/DL (ref 12–15.9)
IMM GRANULOCYTES # BLD AUTO: 0.09 10*3/MM3 (ref 0–0.05)
IMM GRANULOCYTES NFR BLD AUTO: 0.7 % (ref 0–0.5)
LYMPHOCYTES # BLD AUTO: 1 10*3/MM3 (ref 0.7–3.1)
LYMPHOCYTES NFR BLD AUTO: 7.3 % (ref 19.6–45.3)
MCH RBC QN AUTO: 30.6 PG (ref 26.6–33)
MCHC RBC AUTO-ENTMCNC: 33 G/DL (ref 31.5–35.7)
MCV RBC AUTO: 92.7 FL (ref 79–97)
METHADONE UR QL SCN: NEGATIVE
MONOCYTES # BLD AUTO: 0.5 10*3/MM3 (ref 0.1–0.9)
MONOCYTES NFR BLD AUTO: 3.6 % (ref 5–12)
NEUTROPHILS NFR BLD AUTO: 12.05 10*3/MM3 (ref 1.7–7)
NEUTROPHILS NFR BLD AUTO: 87.9 % (ref 42.7–76)
NRBC BLD AUTO-RTO: 0 /100 WBC (ref 0–0.2)
OPIATES UR QL: NEGATIVE
OXYCODONE UR QL SCN: NEGATIVE
PCP UR QL SCN: NEGATIVE
PLATELET # BLD AUTO: 186 10*3/MM3 (ref 140–450)
PMV BLD AUTO: 9.8 FL (ref 6–12)
POTASSIUM SERPL-SCNC: 3.8 MMOL/L (ref 3.5–5.2)
PROT SERPL-MCNC: 6.3 G/DL (ref 6–8.5)
RBC # BLD AUTO: 4.09 10*6/MM3 (ref 3.77–5.28)
SALICYLATES SERPL-MCNC: <0.3 MG/DL
SODIUM SERPL-SCNC: 136 MMOL/L (ref 136–145)
TRICYCLICS UR QL SCN: POSITIVE
WBC NRBC COR # BLD AUTO: 13.7 10*3/MM3 (ref 3.4–10.8)

## 2024-09-05 PROCEDURE — 80053 COMPREHEN METABOLIC PANEL: CPT | Performed by: STUDENT IN AN ORGANIZED HEALTH CARE EDUCATION/TRAINING PROGRAM

## 2024-09-05 PROCEDURE — 99285 EMERGENCY DEPT VISIT HI MDM: CPT

## 2024-09-05 PROCEDURE — 36415 COLL VENOUS BLD VENIPUNCTURE: CPT

## 2024-09-05 PROCEDURE — 25810000003 SODIUM CHLORIDE 0.9 % SOLUTION: Performed by: STUDENT IN AN ORGANIZED HEALTH CARE EDUCATION/TRAINING PROGRAM

## 2024-09-05 PROCEDURE — 80143 DRUG ASSAY ACETAMINOPHEN: CPT | Performed by: STUDENT IN AN ORGANIZED HEALTH CARE EDUCATION/TRAINING PROGRAM

## 2024-09-05 PROCEDURE — 85025 COMPLETE CBC W/AUTO DIFF WBC: CPT | Performed by: STUDENT IN AN ORGANIZED HEALTH CARE EDUCATION/TRAINING PROGRAM

## 2024-09-05 PROCEDURE — 82077 ASSAY SPEC XCP UR&BREATH IA: CPT | Performed by: STUDENT IN AN ORGANIZED HEALTH CARE EDUCATION/TRAINING PROGRAM

## 2024-09-05 PROCEDURE — P9612 CATHETERIZE FOR URINE SPEC: HCPCS

## 2024-09-05 PROCEDURE — 82948 REAGENT STRIP/BLOOD GLUCOSE: CPT

## 2024-09-05 PROCEDURE — 80307 DRUG TEST PRSMV CHEM ANLYZR: CPT | Performed by: STUDENT IN AN ORGANIZED HEALTH CARE EDUCATION/TRAINING PROGRAM

## 2024-09-05 PROCEDURE — 25810000003 SODIUM CHLORIDE 0.9 % SOLUTION: Performed by: EMERGENCY MEDICINE

## 2024-09-05 PROCEDURE — 80179 DRUG ASSAY SALICYLATE: CPT | Performed by: STUDENT IN AN ORGANIZED HEALTH CARE EDUCATION/TRAINING PROGRAM

## 2024-09-05 PROCEDURE — 93005 ELECTROCARDIOGRAM TRACING: CPT | Performed by: STUDENT IN AN ORGANIZED HEALTH CARE EDUCATION/TRAINING PROGRAM

## 2024-09-05 PROCEDURE — 96360 HYDRATION IV INFUSION INIT: CPT

## 2024-09-05 PROCEDURE — 83735 ASSAY OF MAGNESIUM: CPT | Performed by: EMERGENCY MEDICINE

## 2024-09-05 RX ORDER — SODIUM CHLORIDE 0.9 % (FLUSH) 0.9 %
10 SYRINGE (ML) INJECTION AS NEEDED
Status: DISCONTINUED | OUTPATIENT
Start: 2024-09-05 | End: 2024-09-06 | Stop reason: HOSPADM

## 2024-09-05 RX ADMIN — SODIUM CHLORIDE 1000 ML: 9 INJECTION, SOLUTION INTRAVENOUS at 19:50

## 2024-09-05 RX ADMIN — SODIUM CHLORIDE 1000 ML: 9 INJECTION, SOLUTION INTRAVENOUS at 18:26

## 2024-09-05 NOTE — ED PROVIDER NOTES
Subjective   History of Present Illness    59-year-old female with past medical history of hypertension, hyperlipidemia, hypothyroidism presenting for suicide attempt after medication overdose.  Patient states that she took 50 to 55 tablets of her 25 mg Seroquel at approximately 8 AM this morning.  Patient did receive approximately 500 cc of normal saline en route with EMS for low blood pressure.  Patient denies any acute complaints.    Review of Systems    Past Medical History:   Diagnosis Date    Anemia     Anxiety     Depression     Hyperlipidemia     Hypertension     Hypothyroidism     radiation to kill thyroid    Suicide attempt     attempted OD on Seroquel 9/5/24    Tuberculosis        No Known Allergies    Past Surgical History:   Procedure Laterality Date    BREAST SURGERY      REDUCTION MAMMAPLASTY Bilateral 2000         TUBAL ABDOMINAL LIGATION         Family History   Problem Relation Age of Onset    COPD Mother     Thyroid disease Mother     Hypertension Mother     Emphysema Father     Hypertension Father     Heart disease Father     Cancer Father     COPD Sister     Hypertension Sister     Breast cancer Sister 50    Hypertension Brother     Heart disease Brother     Cancer Maternal Aunt     Cancer Paternal Uncle        Social History     Socioeconomic History    Marital status:    Tobacco Use    Smoking status: Every Day     Current packs/day: 1.00     Average packs/day: 1 pack/day for 44.0 years (44.0 ttl pk-yrs)     Types: Cigarettes    Smokeless tobacco: Never   Vaping Use    Vaping status: Never Used   Substance and Sexual Activity    Alcohol use: No     Comment: Past    Drug use: No    Sexual activity: Defer           Objective   Physical Exam  Vitals and nursing note reviewed.   Constitutional:       General: She is not in acute distress.     Comments: Somnolent but wakes easily to voice   HENT:      Head: Normocephalic.      Mouth/Throat:      Mouth: Mucous membranes are moist.   Eyes:       Extraocular Movements: Extraocular movements intact.      Conjunctiva/sclera: Conjunctivae normal.      Pupils: Pupils are equal, round, and reactive to light.   Cardiovascular:      Rate and Rhythm: Normal rate and regular rhythm.      Pulses: Normal pulses.   Pulmonary:      Effort: Pulmonary effort is normal.      Breath sounds: Normal breath sounds.   Abdominal:      General: Abdomen is flat. There is no distension.      Palpations: Abdomen is soft.      Tenderness: There is no abdominal tenderness.   Musculoskeletal:      Cervical back: Normal range of motion and neck supple.   Skin:     General: Skin is warm and dry.      Capillary Refill: Capillary refill takes less than 2 seconds.   Neurological:      General: No focal deficit present.      Mental Status: She is oriented to person, place, and time.         Procedures  Results for orders placed or performed during the hospital encounter of 09/05/24   Comprehensive Metabolic Panel    Specimen: Blood   Result Value Ref Range    Glucose 110 (H) 65 - 99 mg/dL    BUN 15 8 - 23 mg/dL    Creatinine 1.15 (H) 0.57 - 1.00 mg/dL    Sodium 136 136 - 145 mmol/L    Potassium 3.8 3.5 - 5.2 mmol/L    Chloride 103 98 - 107 mmol/L    CO2 20.8 (L) 22.0 - 29.0 mmol/L    Calcium 8.8 8.6 - 10.5 mg/dL    Total Protein 6.3 6.0 - 8.5 g/dL    Albumin 3.9 3.5 - 5.2 g/dL    ALT (SGPT) 14 1 - 33 U/L    AST (SGOT) 20 1 - 32 U/L    Alkaline Phosphatase 80 39 - 117 U/L    Total Bilirubin 0.2 0.0 - 1.2 mg/dL    Globulin 2.4 gm/dL    A/G Ratio 1.6 g/dL    BUN/Creatinine Ratio 13.0 7.0 - 25.0    Anion Gap 12.2 5.0 - 15.0 mmol/L    eGFR 54.6 (L) >60.0 mL/min/1.73   Acetaminophen Level    Specimen: Blood   Result Value Ref Range    Acetaminophen <5.0 0.0 - 30.0 mcg/mL   Ethanol    Specimen: Blood   Result Value Ref Range    Ethanol <10 0 - 10 mg/dL    Ethanol % <0.010 %   Salicylate Level    Specimen: Blood   Result Value Ref Range    Salicylate <0.3 <=30.0 mg/dL   Urine Drug Screen - Straight  Cath    Specimen: Straight Cath; Urine   Result Value Ref Range    THC, Screen, Urine Positive (A) Negative    Phencyclidine (PCP), Urine Negative Negative    Cocaine Screen, Urine Positive (A) Negative    Methamphetamine, Ur Negative Negative    Opiate Screen Negative Negative    Amphetamine Screen, Urine Negative Negative    Benzodiazepine Screen, Urine Positive (A) Negative    Tricyclic Antidepressants Screen Positive (A) Negative    Methadone Screen, Urine Negative Negative    Barbiturates Screen, Urine Negative Negative    Oxycodone Screen, Urine Negative Negative    Buprenorphine, Screen, Urine Negative Negative   CBC Auto Differential    Specimen: Blood   Result Value Ref Range    WBC 13.70 (H) 3.40 - 10.80 10*3/mm3    RBC 4.09 3.77 - 5.28 10*6/mm3    Hemoglobin 12.5 12.0 - 15.9 g/dL    Hematocrit 37.9 34.0 - 46.6 %    MCV 92.7 79.0 - 97.0 fL    MCH 30.6 26.6 - 33.0 pg    MCHC 33.0 31.5 - 35.7 g/dL    RDW 13.4 12.3 - 15.4 %    RDW-SD 45.8 37.0 - 54.0 fl    MPV 9.8 6.0 - 12.0 fL    Platelets 186 140 - 450 10*3/mm3    Neutrophil % 87.9 (H) 42.7 - 76.0 %    Lymphocyte % 7.3 (L) 19.6 - 45.3 %    Monocyte % 3.6 (L) 5.0 - 12.0 %    Eosinophil % 0.1 (L) 0.3 - 6.2 %    Basophil % 0.4 0.0 - 1.5 %    Immature Grans % 0.7 (H) 0.0 - 0.5 %    Neutrophils, Absolute 12.05 (H) 1.70 - 7.00 10*3/mm3    Lymphocytes, Absolute 1.00 0.70 - 3.10 10*3/mm3    Monocytes, Absolute 0.50 0.10 - 0.90 10*3/mm3    Eosinophils, Absolute 0.01 0.00 - 0.40 10*3/mm3    Basophils, Absolute 0.05 0.00 - 0.20 10*3/mm3    Immature Grans, Absolute 0.09 (H) 0.00 - 0.05 10*3/mm3    nRBC 0.0 0.0 - 0.2 /100 WBC   Fentanyl, Urine - Straight Cath    Specimen: Straight Cath; Urine   Result Value Ref Range    Fentanyl, Urine Negative Negative   Magnesium    Specimen: Blood   Result Value Ref Range    Magnesium 2.0 1.6 - 2.4 mg/dL   POC Glucose Once    Specimen: Blood   Result Value Ref Range    Glucose 118 70 - 130 mg/dL   ECG 12 Lead QT Measurement   Result  Value Ref Range    QT Interval 420 ms    QTC Interval 481 ms   ECG 12 Lead QT Measurement   Result Value Ref Range    QT Interval 396 ms    QTC Interval 465 ms                ED Course  ED Course as of 09/06/24 0542   u Sep 05, 2024   1854 Patient signed out to oncoming provider, Dr. Partida. [KH]   2255 I assumed patient's care from Dr. Farrar this evening at shift change.  Please see her documentation above.  Since my arrival the patient has been sleeping very soundly, blood pressure has been soft, but I think that this is due at least in part to her being soundly asleep.  Currently monitor showing sinus rhythm at 95, blood pressure 87/65 with a MAP of 72.  Technically she is past her observation period   recommended by poison center, but we will have to observe her further until she becomes more awake.  She does awaken to voice and briefly converse but does directly back to deep sleep.  I have ordered a repeat EKG to recheck her QT interval. [CM]   2304 EKG at 2259 shows sinus rhythm, rate 83.  FL interval 168, QRS duration 74, QTc 465 ms.  Nonspecific T wave abnormality.  No evidence for STEMI. [CM]   Fri Sep 06, 2024   0359 Patient has been doing well, has slept throughout the evening and night up until this point.  She awakens to voice.  Currently the cardiac monitor showing sinus rhythm, rate 68.  Blood pressure 125/58.  Patient is medically cleared for psychiatry, they have been notified to evaluate her please. [CM]   0540 Patient has been evaluated by psychiatry intake, is being admitted to the psychiatry unit/discharged to behavioral health. [CM]      ED Course User Index  [CM] Stone Partida MD  [KH] Brittany Farrar MD                                             Medical Decision Making  Problems Addressed:  Suicide attempt by other psychotropic drug overdose, initial encounter: complicated acute illness or injury    Amount and/or Complexity of Data Reviewed  Labs: ordered.  ECG/medicine tests:  ordered.    Risk  Prescription drug management.      Suicide attempt via medication overdose with reported Seroquel  Fluctuating blood pressure, will be 110s then will go down to 80s.  Started on IV fluids.  Will continue to monitor  No hypoxia.  Patient was hypothermic on arrival and placed on Lloyd hugger  UDS positive for multiple substances including THC, cocaine, benzos and TCAs  Creatinine slightly elevated from patient's baseline.  Mild elevation white blood cell count noted but no infectious symptoms.  EKG without notable QTc prolongation.  Will continue to monitor in the emergency room for stability.  Will require psych evaluation after stabilization      Final diagnoses:   Suicide attempt by other psychotropic drug overdose, initial encounter       ED Disposition  ED Disposition       ED Disposition   DC/Transfer to Behavioral Health Condition   Stable    Comment   --               Please note that portions of this note were completed with a voice recognition program.                Stone Partida MD  09/06/24 2529

## 2024-09-05 NOTE — ED NOTES
Contacted poison control at this time. Pearl recommended to monitor patient for 6 hours from [facility]A and provide symptomatic care. Pearl states to watch for drowsiness, dizziness, prolonged QTC, seizures, and tachycardia. After 6 hours, patient will be able to follow up with psych.

## 2024-09-05 NOTE — ED NOTES
Patients daughter requesting to be called if patient is admitted. Phone number [phone], Name Tammy.

## 2024-09-06 ENCOUNTER — HOSPITAL ENCOUNTER (INPATIENT)
Facility: HOSPITAL | Age: 59
LOS: 5 days | Discharge: HOME | DRG: 918 | End: 2024-09-11
Attending: STUDENT IN AN ORGANIZED HEALTH CARE EDUCATION/TRAINING PROGRAM | Admitting: STUDENT IN AN ORGANIZED HEALTH CARE EDUCATION/TRAINING PROGRAM
Payer: MEDICAID

## 2024-09-06 VITALS
OXYGEN SATURATION: 93 % | HEART RATE: 81 BPM | RESPIRATION RATE: 16 BRPM | HEIGHT: 63 IN | WEIGHT: 144 LBS | BODY MASS INDEX: 25.52 KG/M2 | SYSTOLIC BLOOD PRESSURE: 123 MMHG | TEMPERATURE: 98.5 F | DIASTOLIC BLOOD PRESSURE: 76 MMHG

## 2024-09-06 PROBLEM — T65.92XA: Status: ACTIVE | Noted: 2024-09-06

## 2024-09-06 PROBLEM — F12.20 TETRAHYDROCANNABINOL (THC) USE DISORDER, SEVERE, DEPENDENCE: Status: ACTIVE | Noted: 2024-09-06

## 2024-09-06 PROBLEM — F33.2 MDD (MAJOR DEPRESSIVE DISORDER), RECURRENT SEVERE, WITHOUT PSYCHOSIS: Status: ACTIVE | Noted: 2024-09-06

## 2024-09-06 LAB
BACTERIA UR QL AUTO: ABNORMAL /HPF
BILIRUB UR QL STRIP: NEGATIVE
CLARITY UR: CLEAR
COLOR UR: YELLOW
GLUCOSE UR STRIP-MCNC: NEGATIVE MG/DL
HAV IGM SERPL QL IA: NORMAL
HBV CORE IGM SERPL QL IA: NORMAL
HBV SURFACE AG SERPL QL IA: NORMAL
HCV AB SER QL: NORMAL
HGB UR QL STRIP.AUTO: ABNORMAL
HYALINE CASTS UR QL AUTO: ABNORMAL /LPF
KETONES UR QL STRIP: ABNORMAL
LEUKOCYTE ESTERASE UR QL STRIP.AUTO: NEGATIVE
MAGNESIUM SERPL-MCNC: 2 MG/DL (ref 1.6–2.4)
NITRITE UR QL STRIP: NEGATIVE
PH UR STRIP.AUTO: 6 [PH] (ref 5–8)
PROT UR QL STRIP: NEGATIVE
RBC # UR STRIP: ABNORMAL /HPF
REF LAB TEST METHOD: ABNORMAL
SP GR UR STRIP: 1.01 (ref 1–1.03)
SQUAMOUS #/AREA URNS HPF: ABNORMAL /HPF
UROBILINOGEN UR QL STRIP: ABNORMAL
WBC # UR STRIP: ABNORMAL /HPF

## 2024-09-06 PROCEDURE — 81001 URINALYSIS AUTO W/SCOPE: CPT | Performed by: EMERGENCY MEDICINE

## 2024-09-06 PROCEDURE — 80074 ACUTE HEPATITIS PANEL: CPT | Performed by: STUDENT IN AN ORGANIZED HEALTH CARE EDUCATION/TRAINING PROGRAM

## 2024-09-06 PROCEDURE — 99223 1ST HOSP IP/OBS HIGH 75: CPT | Performed by: PSYCHIATRY & NEUROLOGY

## 2024-09-06 RX ORDER — LEVOCETIRIZINE DIHYDROCHLORIDE 5 MG/1
5 TABLET, FILM COATED ORAL EVERY EVENING
COMMUNITY

## 2024-09-06 RX ORDER — GABAPENTIN 300 MG/1
600 CAPSULE ORAL 3 TIMES DAILY PRN
Status: CANCELLED | OUTPATIENT
Start: 2024-09-06

## 2024-09-06 RX ORDER — CETIRIZINE HYDROCHLORIDE 10 MG/1
10 TABLET ORAL DAILY
Status: DISCONTINUED | OUTPATIENT
Start: 2024-09-06 | End: 2024-09-11 | Stop reason: HOSPADM

## 2024-09-06 RX ORDER — BREXPIPRAZOLE 1 MG/1
1 TABLET ORAL DAILY
COMMUNITY

## 2024-09-06 RX ORDER — LEVOTHYROXINE SODIUM 50 UG/1
50 TABLET ORAL
Status: DISCONTINUED | OUTPATIENT
Start: 2024-09-06 | End: 2024-09-11 | Stop reason: HOSPADM

## 2024-09-06 RX ORDER — ECHINACEA PURPUREA EXTRACT 125 MG
2 TABLET ORAL AS NEEDED
Status: DISCONTINUED | OUTPATIENT
Start: 2024-09-06 | End: 2024-09-11 | Stop reason: HOSPADM

## 2024-09-06 RX ORDER — TRAZODONE HYDROCHLORIDE 50 MG/1
12.5 TABLET, FILM COATED ORAL NIGHTLY PRN
Status: DISPENSED | OUTPATIENT
Start: 2024-09-06 | End: 2024-09-08

## 2024-09-06 RX ORDER — BENZONATATE 100 MG/1
100 CAPSULE ORAL 3 TIMES DAILY PRN
Status: DISCONTINUED | OUTPATIENT
Start: 2024-09-06 | End: 2024-09-11 | Stop reason: HOSPADM

## 2024-09-06 RX ORDER — TRAZODONE HYDROCHLORIDE 50 MG/1
50 TABLET, FILM COATED ORAL NIGHTLY
Status: CANCELLED | OUTPATIENT
Start: 2024-09-06

## 2024-09-06 RX ORDER — LEVOTHYROXINE SODIUM 50 UG/1
50 TABLET ORAL
COMMUNITY

## 2024-09-06 RX ORDER — BREXPIPRAZOLE 2 MG/1
2 TABLET ORAL DAILY
Status: ON HOLD | COMMUNITY
End: 2024-09-06

## 2024-09-06 RX ORDER — METOPROLOL SUCCINATE 25 MG/1
25 TABLET, EXTENDED RELEASE ORAL DAILY
Status: DISCONTINUED | OUTPATIENT
Start: 2024-09-06 | End: 2024-09-11 | Stop reason: HOSPADM

## 2024-09-06 RX ORDER — VENLAFAXINE HYDROCHLORIDE 75 MG/1
225 CAPSULE, EXTENDED RELEASE ORAL DAILY
Status: DISCONTINUED | OUTPATIENT
Start: 2024-09-06 | End: 2024-09-11 | Stop reason: HOSPADM

## 2024-09-06 RX ORDER — GABAPENTIN 600 MG/1
600 TABLET ORAL 3 TIMES DAILY PRN
COMMUNITY

## 2024-09-06 RX ORDER — VENLAFAXINE HYDROCHLORIDE 75 MG/1
225 CAPSULE, EXTENDED RELEASE ORAL DAILY
COMMUNITY

## 2024-09-06 RX ORDER — GABAPENTIN 300 MG/1
600 CAPSULE ORAL 3 TIMES DAILY PRN
Status: DISCONTINUED | OUTPATIENT
Start: 2024-09-06 | End: 2024-09-11 | Stop reason: HOSPADM

## 2024-09-06 RX ORDER — IBUPROFEN 400 MG/1
400 TABLET, FILM COATED ORAL EVERY 6 HOURS PRN
Status: DISCONTINUED | OUTPATIENT
Start: 2024-09-06 | End: 2024-09-11 | Stop reason: HOSPADM

## 2024-09-06 RX ORDER — ATORVASTATIN CALCIUM 40 MG/1
40 TABLET, FILM COATED ORAL NIGHTLY
COMMUNITY

## 2024-09-06 RX ORDER — ONDANSETRON 4 MG/1
4 TABLET, ORALLY DISINTEGRATING ORAL EVERY 6 HOURS PRN
Status: DISCONTINUED | OUTPATIENT
Start: 2024-09-06 | End: 2024-09-11 | Stop reason: HOSPADM

## 2024-09-06 RX ORDER — LOPERAMIDE HCL 2 MG
2 CAPSULE ORAL
Status: DISCONTINUED | OUTPATIENT
Start: 2024-09-06 | End: 2024-09-11 | Stop reason: HOSPADM

## 2024-09-06 RX ORDER — QUETIAPINE FUMARATE 100 MG/1
25 TABLET, FILM COATED ORAL NIGHTLY
Status: CANCELLED | OUTPATIENT
Start: 2024-09-06

## 2024-09-06 RX ORDER — TRAZODONE HYDROCHLORIDE 50 MG/1
50 TABLET, FILM COATED ORAL NIGHTLY
COMMUNITY

## 2024-09-06 RX ORDER — ACETAMINOPHEN 325 MG/1
650 TABLET ORAL EVERY 6 HOURS PRN
Status: DISCONTINUED | OUTPATIENT
Start: 2024-09-06 | End: 2024-09-11 | Stop reason: HOSPADM

## 2024-09-06 RX ORDER — ATORVASTATIN CALCIUM 40 MG/1
40 TABLET, FILM COATED ORAL NIGHTLY
Status: DISCONTINUED | OUTPATIENT
Start: 2024-09-06 | End: 2024-09-11 | Stop reason: HOSPADM

## 2024-09-06 RX ORDER — METOPROLOL SUCCINATE 25 MG/1
25 TABLET, EXTENDED RELEASE ORAL DAILY
Status: CANCELLED | OUTPATIENT
Start: 2024-09-06

## 2024-09-06 RX ORDER — BISACODYL 5 MG/1
5 TABLET, DELAYED RELEASE ORAL DAILY PRN
Status: DISCONTINUED | OUTPATIENT
Start: 2024-09-06 | End: 2024-09-11 | Stop reason: HOSPADM

## 2024-09-06 RX ORDER — LEVOTHYROXINE SODIUM 50 UG/1
50 TABLET ORAL
Status: CANCELLED | OUTPATIENT
Start: 2024-09-06

## 2024-09-06 RX ORDER — CETIRIZINE HYDROCHLORIDE 10 MG/1
10 TABLET ORAL DAILY
Status: CANCELLED | OUTPATIENT
Start: 2024-09-06

## 2024-09-06 RX ORDER — ATORVASTATIN CALCIUM 40 MG/1
40 TABLET, FILM COATED ORAL NIGHTLY
Status: CANCELLED | OUTPATIENT
Start: 2024-09-06

## 2024-09-06 RX ORDER — METOPROLOL SUCCINATE 25 MG/1
25 TABLET, EXTENDED RELEASE ORAL DAILY
COMMUNITY

## 2024-09-06 RX ORDER — ALUMINA, MAGNESIA, AND SIMETHICONE 2400; 2400; 240 MG/30ML; MG/30ML; MG/30ML
15 SUSPENSION ORAL EVERY 6 HOURS PRN
Status: DISCONTINUED | OUTPATIENT
Start: 2024-09-06 | End: 2024-09-11 | Stop reason: HOSPADM

## 2024-09-06 RX ORDER — QUETIAPINE FUMARATE 25 MG/1
25 TABLET, FILM COATED ORAL NIGHTLY
COMMUNITY
End: 2024-09-11 | Stop reason: HOSPADM

## 2024-09-06 RX ORDER — VENLAFAXINE HYDROCHLORIDE 75 MG/1
225 CAPSULE, EXTENDED RELEASE ORAL DAILY
Status: CANCELLED | OUTPATIENT
Start: 2024-09-06

## 2024-09-06 RX ADMIN — TRAZODONE HYDROCHLORIDE 12.5 MG: 50 TABLET ORAL at 20:37

## 2024-09-06 RX ADMIN — IBUPROFEN 400 MG: 400 TABLET, FILM COATED ORAL at 16:19

## 2024-09-06 RX ADMIN — ATORVASTATIN CALCIUM 40 MG: 40 TABLET, FILM COATED ORAL at 20:37

## 2024-09-06 RX ADMIN — VENLAFAXINE HYDROCHLORIDE 225 MG: 75 CAPSULE, EXTENDED RELEASE ORAL at 11:59

## 2024-09-06 NOTE — PLAN OF CARE
Goal Outcome Evaluation:        Consent Given to Review Plan with: Braden Alexis  Progress: improving  Outcome Evaluation: Therapist met with patient to review care plan, social history, aftercare recommendation, and disposition plan; patient agreeable.        Problem: Adult Behavioral Health Plan of Care  Goal: Plan of Care Review  Outcome: Ongoing, Progressing  Flowsheets  Taken 9/6/2024 1149 by Rene Hermosillo  Consent Given to Review Plan with: Braden Alexis  Progress: improving  Outcome Evaluation:   Therapist met with patient to review care plan, social history, aftercare recommendation, and disposition plan   patient agreeable.  Taken 9/6/2024 0813 by Rigoberto Tom RN  Plan of Care Reviewed With: patient  Patient Agreement with Plan of Care: agrees     Problem: Adult Behavioral Health Plan of Care  Goal: Patient-Specific Goal (Individualization)  Outcome: Ongoing, Progressing  Flowsheets  Taken 9/6/2024 1149  Patient-Specific Goals (Include Timeframe): Identify 2-3 coping skills, complete aftercare plan, complete safety plan, and deny SI/HI within 1-7 days.  Individualized Care Needs: Therapist to offer 1-4 therapy sessions, aftercare planning, safety planning, family education, daily groups and brief CBT/MI interventions.  Anxieties, Fears or Concerns: None stated  Taken 9/6/2024 1125  Patient Personal Strengths:   resourceful   resilient   self-reliant  Patient Vulnerabilities:   lacks insight into illness   adverse childhood experience(s)     Problem: Adult Behavioral Health Plan of Care  Goal: Optimized Coping Skills in Response to Life Stressors  Outcome: Ongoing, Progressing  Flowsheets (Taken 9/6/2024 1149)  Optimized Coping Skills in Response to Life Stressors: making progress toward outcome  Intervention: Promote Effective Coping Strategies  Flowsheets (Taken 9/6/2024 1149)  Supportive Measures:   active listening utilized   positive reinforcement provided   verbalization  of feelings encouraged   decision-making supported     Problem: Adult Behavioral Health Plan of Care  Goal: Develops/Participates in Therapeutic [place] to Support Successful Transition  Outcome: Ongoing, Progressing  Flowsheets (Taken 9/6/2024 1149)  Develops/Participates in Therapeutic [place] to Support Successful Transition: making progress toward outcome  Intervention: Foster Therapeutic [place]  Flowsheets (Taken 9/6/2024 1149)  Trust Relationship/Rapport:   care explained   choices provided   emotional support provided   empathic listening provided   questions answered   questions encouraged   reassurance provided   thoughts/feelings acknowledged  Intervention: Mutually Develop Transition Plan  Flowsheets  Taken 9/6/2024 1149 by Rene Hermosillo  Outpatient/Agency/Support Group Needs:   outpatient medication management   outpatient psychiatric care (specify)   outpatient counseling  Discharge Coordination/Progress:   Therapist met with patient to complete discharge needs assessment   patient uncertain of aftercare at this time.  Transition Support:   crisis management plan promoted   crisis management plan verbalized   follow-up care discussed  Anticipated Discharge Disposition: home or self-care  Concerns to be Addressed: mental health  Readmission Within the Last 30 Days: no previous admission in last 30 days  Offered/Gave Vendor List: no  Taken 9/6/2024 0813 by Rigoberto Tom, RN  Transportation Anticipated: car, drives self  Patient/Family Anticipated Services at Transition: none  Patient/Family Anticipates Transition to: home     DATA:      Therapist met individually with Patient this date for initial evaluation.  Introduced self as Therapist and the role of a positive therapeutic relationship; Patient agreeable.    Therapist encouraged Patient to speak openly and honestly about any issues or stressors during treatment stay. Therapist explained how open communication is significant to providing most  effective care.      Therapist completed psychosocial assessment, integrated summary, reviewed care plans, disposition planning and discussed hospitalization expectations and treatment goals this date.     Therapist provided education regarding different levels of care and is recommending outpatient therapy and medication management for most appropriate aftercare. Patient uncertain about this and reports she does not believe in therapy.     Therapist is recommending family involvement prior to discharge and it's importance. Patient agreeable and signed consent for Tammy Cedeño, Daughter.     Therapist spoke with daughter on this date. Daughter states that patient has been down for a while and is resistant to care. Daughter states that the patient asked to her come to intake a few weeks ago, but then lied to the intake nurse so she would not be admitted.     Safety planning was completed with daughter on this date. Daughter agreeable to limit access to firearms, sharp objects, medications, and any other potentially hazardous materials.     CLINICAL MANUVERING/INTERVENTIONS:  Assisted Patient in processing session content; acknowledged and normalized Patient’s thoughts, feelings, and concerns by utilizing a person-centered approach in efforts to build appropriate rapport and a positive therapeutic relationship with open and honest communication. Allowed Patient to ventilate regarding current stressors and triggers for negative emotions and thoughts in a safe nonjudgmental environment with unconditional positive regard, active listening skills, and empathy.      ASSESSMENT: Patient is a 59 year old female who presented to the ED after an attempted overdose. Patient was seen 1:1 in office on this date. Patient was tearful in affect. Patient reports feeling overwhelmed by everything going on in her life. Patient states that her brother is reliant upon her and that she has to take care of him and make sure he has  somewhere to stay. Patient reports strong emotions around her sisters recent discovery of cancer that has spread to her brain and her move to hospice care. Patient reports guilt over this since she has had minimal contact with her sister in the last 10 years after a family disagreement. Patient states she feels as if she abandoned her sister and it is too late to repair the relationship. Therapist encouraged patient to make contact with sister following discharge and make effort to repair the relationship. Patient reports her anxiety at a 6/10 and her depression at 10/10. Patient denies SI/HI/AVH.     PLAN:   Patient will receive 24/7 nursing monitoring and daily psychiatrist evaluation by a multidisciplinary team.    Patient will continue stabilization at this time.     Patient is not agreeable for outpatient services at this time.

## 2024-09-06 NOTE — ED NOTES
Asked tech sitting with patient to ask the patient to provide urine sample and took a bedside into room.

## 2024-09-06 NOTE — PLAN OF CARE
Goal Outcome Evaluation:  Plan of Care Reviewed With: patient  Patient Agreement with Plan of Care: agrees           NP ADMITTED TO UNIT, EDUCATED TO UNIT, CP INITIATED.

## 2024-09-06 NOTE — NURSING NOTE
Spoke to Dr. Molina via phone. Intake information provided. Instructed to admit the patient. Admit orders received. SP3 routine orders RBTOx2. Patient and ED provider made aware of plan of care. Safety precautions maintained. EKG completed in ED.

## 2024-09-06 NOTE — NURSING NOTE
"Intake assessment completed in ED room 4 at this time. Pt presents after attempted intentional overdose on 25mg Seroquel. Pt states she took the remainder of her Seroquel 25 mg pills in attempt to end her life. Pt reports she was prescribed once a day Seroquel 25mg approx 1 week ago and she took them for 4 days before stopping, then took the remainder yesterday in attempt to intentionally overdose. Pt reports stressors of taking care of her older brother \"who drives me crazy\" and little sister being told on Monday that she has 12 tumors in her head. Pt states, \"It should have been me, not her. I don't know why I woke up. I just want to feel normal. I want to be happy, but I haven't been happy in a long time.\" Pt reports 2 inpt Psych admits, 3 detox inpt admits in past. Pt sees outpt, takes meds, and had been in rehab in past. Pt denies any drug/ETOH use recently.    Labs  eGFR 54.6  UDS + THC, cocaine, benzos, TCAs  WBC 13.70    Anxiety 5 depression 10 on scale of 0-10. Sleep & appetite poor.  Pt denies any HI/AVH.    Pt A&Ox 3.  "

## 2024-09-06 NOTE — NURSING NOTE
Report given to EVA Richardson.   September 6, 2022    Shu Bright  [address] [address]             [facility]  Obstetrics and Gynecology  [address] [address]  Phone: [phone]  Fax: [phone]   September 6, 2022     Patient: Shu Bright   YOB: 1996   Date of Visit: 9/6/2022       To Whom it May Concern:    Shu Bright was seen in my clinic on 9/6/2022. She may return with no restrictions.    Please excuse her from any classes or work missed.    If you have any questions or concerns, please don't hesitate to call.    Sincerely,           Bianca Pack MD

## 2024-09-06 NOTE — PLAN OF CARE
Goal Outcome Evaluation:  Plan of Care Reviewed With: patient  Patient Agreement with Plan of Care: agrees     Progress: no change  Outcome Evaluation: PATIENT REMAINED IN ROOM/BED MOST OF THE SHIFT.  DID GO TO DINING ROOM FOR MEALS, DENIES S/I THIS AFTERNOON, COOPERATIVE AND TREMORS NOTED OF HANDS BUT STILL DENIES ANY SUBSTANCE ABUSE.

## 2024-09-07 LAB
CHOLEST SERPL-MCNC: 118 MG/DL (ref 0–200)
HBA1C MFR BLD: 5.7 % (ref 4.8–5.6)
HDLC SERPL-MCNC: 40 MG/DL (ref 40–60)
LDLC SERPL CALC-MCNC: 60 MG/DL (ref 0–100)
LDLC/HDLC SERPL: 1.48 {RATIO}
TRIGL SERPL-MCNC: 94 MG/DL (ref 0–150)
VLDLC SERPL-MCNC: 18 MG/DL (ref 5–40)

## 2024-09-07 PROCEDURE — 99232 SBSQ HOSP IP/OBS MODERATE 35: CPT | Performed by: PSYCHIATRY & NEUROLOGY

## 2024-09-07 PROCEDURE — 83036 HEMOGLOBIN GLYCOSYLATED A1C: CPT | Performed by: STUDENT IN AN ORGANIZED HEALTH CARE EDUCATION/TRAINING PROGRAM

## 2024-09-07 PROCEDURE — 80061 LIPID PANEL: CPT | Performed by: STUDENT IN AN ORGANIZED HEALTH CARE EDUCATION/TRAINING PROGRAM

## 2024-09-07 RX ADMIN — BREXPIPRAZOLE 1 MG: 1 TABLET ORAL at 08:34

## 2024-09-07 RX ADMIN — ACETAMINOPHEN 650 MG: 325 TABLET ORAL at 16:17

## 2024-09-07 RX ADMIN — CETIRIZINE HYDROCHLORIDE 10 MG: 10 TABLET, FILM COATED ORAL at 08:34

## 2024-09-07 RX ADMIN — ATORVASTATIN CALCIUM 40 MG: 40 TABLET, FILM COATED ORAL at 20:31

## 2024-09-07 RX ADMIN — TRAZODONE HYDROCHLORIDE 12.5 MG: 50 TABLET ORAL at 20:31

## 2024-09-07 RX ADMIN — METOPROLOL SUCCINATE 25 MG: 25 TABLET, EXTENDED RELEASE ORAL at 08:34

## 2024-09-07 RX ADMIN — VENLAFAXINE HYDROCHLORIDE 225 MG: 75 CAPSULE, EXTENDED RELEASE ORAL at 08:34

## 2024-09-07 RX ADMIN — LEVOTHYROXINE SODIUM 50 MCG: 0.05 TABLET ORAL at 06:23

## 2024-09-07 NOTE — PLAN OF CARE
Goal Outcome Evaluation:              Outcome Evaluation: Pt reports good sleep and fair appetite. A 3 D 7. Denies SI/HI/AVH

## 2024-09-07 NOTE — PROGRESS NOTES
"INPATIENT PSYCHIATRIC PROGRESS NOTE    Name:  Tarsha White  :  1965  MRN:  [id number]  Visit Number:  [id number]  Length of stay:  1    SUBJECTIVE    CC/Focus of Exam: SI, SA, MDD    INTERVAL HISTORY:  The patient states she is feeling better and is not feeling hopeless or suicidal. She states she took an overdose because she was feeling crazy and felt like she was losing her mind because of her brother and sister. She states being away from the stressors she is feeling better.   Depression rating 4/10  Anxiety rating 7/10  Sleep: Not good  Withdrawal sx: None   Cravin/10    Review of Systems   Constitutional: Negative.    Respiratory: Negative.     Cardiovascular: Negative.    Psychiatric/Behavioral:  Positive for dysphoric mood. The patient is nervous/anxious.        OBJECTIVE    Temp:  [96.1 °F (35.6 °C)-99.2 °F (37.3 °C)] 98.6 °F (37 °C)  Heart Rate:  [] 104  Resp:  [16-18] 18  BP: (115-168)/(72-93) 141/91    MENTAL STATUS EXAM:  Appearance:Casually dressed, good hygeine.   Cooperation:Cooperative  Psychomotor: No psychomotor agitation/retardation, No EPS, No motor tics  Speech-normal rate, amount.  Mood \"anxious\"   Affect-congruent, appropriate, stable  Thought Content-goal directed, no delusional material present  Thought process-linear, organized.  Suicidality: No SI  Homicidality: No HI  Perception: No AH/VH  Insight-fair   Judgement-fair    Lab Results (last 24 hours)       Procedure Component Value Units Date/Time    Lipid Panel [[id number]] Collected: 24    Specimen: Blood Updated: 24     Total Cholesterol 118 mg/dL      Triglycerides 94 mg/dL      HDL Cholesterol 40 mg/dL      LDL Cholesterol  60 mg/dL      VLDL Cholesterol 18 mg/dL      LDL/HDL Ratio 1.48    Narrative:      Cholesterol Reference Ranges  (U.S. Department of Health and Human Services ATP III Classifications)    Desirable          <200 mg/dL  Borderline High    200-239 mg/dL  High Risk          >240 " mg/dL      Triglyceride Reference Ranges  (U.S. Department of Health and Human Services ATP III Classifications)    Normal           <150 mg/dL  Borderline High  150-199 mg/dL  High             200-499 mg/dL  Very High        >500 mg/dL    HDL Reference Ranges  (U.S. Department of Health and Human Services ATP III Classifications)    Low     <40 mg/dl (major risk factor for CHD)  High    >60 mg/dl ('negative' risk factor for CHD)        LDL Reference Ranges  (U.S. Department of Health and Human Services ATP III Classifications)    Optimal          <100 mg/dL  Near Optimal     100-129 mg/dL  Borderline High  130-159 mg/dL  High             160-189 mg/dL  Very High        >189 mg/dL    Hemoglobin A1c [[id number]]  (Abnormal) Collected: 09/07/24 0257    Specimen: Blood Updated: 09/07/24 0331     Hemoglobin A1C 5.70 %     Narrative:      Hemoglobin A1C Ranges:    Increased Risk for Diabetes  5.7% to 6.4%  Diabetes                     >= 6.5%  Diabetic Goal                < 7.0%               Imaging Results (Last 24 Hours)       ** No results found for the last 24 hours. **               ECG/EMG Results (most recent)       None             ALLERGIES: Patient has no known allergies.    Medication Review:   Scheduled Medications:  atorvastatin, 40 mg, Oral, Nightly  Brexpiprazole, 1 mg, Oral, Daily  cetirizine, 10 mg, Oral, Daily  levothyroxine, 50 mcg, Oral, Q AM  metoprolol succinate XL, 25 mg, Oral, Daily  venlafaxine XR, 225 mg, Oral, Daily         PRN Medications:    acetaminophen    aluminum-magnesium hydroxide-simethicone    benzonatate    bisacodyl    gabapentin    ibuprofen    loperamide    magnesium hydroxide    melatonin    ondansetron ODT    sodium chloride    traZODone   All medications reviewed.    ASSESSMENT & PLAN:      Suicidal deliberate poisoning, initial encounter  -SP 3       MDD (major depressive disorder), recurrent severe, without psychosis  -Continue venlafaxine  mg daily  -Continue Rexulti 1  mg daily       Hypertension  -Metoprolol XL 25 mg daily      Hyperlipidemia  -Lipitor       Hypothyroidism following radioiodine therapy  -Levothyroxine 50 mcg daily       Tetrahydrocannabinol (THC) use disorder, severe, dependence  -Supportive treatment       Nicotine use disorder  -Encouraged cessation  -Nicotine replacement    Special precautions: Special Precautions Level 3 (q15 min checks) .    Behavioral Health Treatment Plan and Problem List: I have reviewed and approved the Behavioral Health Treatment Plan and Problem list.  The patient has had a chance to review and agrees with the treatment plan.    Copied text in portions of this note has been reviewed and is accurate as of 09/07/24         Clinician:  Zia Cowan MD  09/07/24  11:35 EDT

## 2024-09-07 NOTE — PLAN OF CARE
Goal Outcome Evaluation:  Plan of Care Reviewed With: patient  Patient Agreement with Plan of Care: agrees     Progress: improving          Patient rates anxiety 5 and depression 4, denies thoughts of harming self and others, and denies hallucinations.

## 2024-09-08 PROCEDURE — 99232 SBSQ HOSP IP/OBS MODERATE 35: CPT | Performed by: PSYCHIATRY & NEUROLOGY

## 2024-09-08 RX ORDER — AMLODIPINE BESYLATE 5 MG/1
5 TABLET ORAL ONCE
Status: COMPLETED | OUTPATIENT
Start: 2024-09-08 | End: 2024-09-08

## 2024-09-08 RX ADMIN — AMLODIPINE BESYLATE 5 MG: 5 TABLET ORAL at 20:22

## 2024-09-08 RX ADMIN — ACETAMINOPHEN 650 MG: 325 TABLET ORAL at 07:42

## 2024-09-08 RX ADMIN — VENLAFAXINE HYDROCHLORIDE 225 MG: 75 CAPSULE, EXTENDED RELEASE ORAL at 08:08

## 2024-09-08 RX ADMIN — Medication 5 MG: at 20:22

## 2024-09-08 RX ADMIN — CETIRIZINE HYDROCHLORIDE 10 MG: 10 TABLET, FILM COATED ORAL at 08:09

## 2024-09-08 RX ADMIN — BREXPIPRAZOLE 1 MG: 1 TABLET ORAL at 08:09

## 2024-09-08 RX ADMIN — METOPROLOL SUCCINATE 25 MG: 25 TABLET, EXTENDED RELEASE ORAL at 08:09

## 2024-09-08 RX ADMIN — LEVOTHYROXINE SODIUM 50 MCG: 0.05 TABLET ORAL at 06:05

## 2024-09-08 RX ADMIN — ATORVASTATIN CALCIUM 40 MG: 40 TABLET, FILM COATED ORAL at 20:22

## 2024-09-08 NOTE — PLAN OF CARE
Goal Outcome Evaluation:  Plan of Care Reviewed With: patient  Patient Agreement with Plan of Care: agrees     Progress: improving  Outcome Evaluation: Pt rates anx 5/10 and dep 4/10.  Pt sleeping good, appetie is fair.  Pt denies any SI/HI/AVH.  Pt verbalizes no complaints this shift.

## 2024-09-08 NOTE — PROGRESS NOTES
"INPATIENT PSYCHIATRIC PROGRESS NOTE    Name:  Tarsha White  :  1965  MRN:  [id number]  Visit Number:  [id number]  Length of stay:  2    SUBJECTIVE    CC/Focus of Exam: SI, SA, MDD    INTERVAL HISTORY:  The patient states she is feeling better today and denies any suicidal thoughts.    Depression rating 4/10  Anxiety rating 7/10  Sleep: Not good  Withdrawal sx: None   Cravin/10    Review of Systems   Constitutional: Negative.    Respiratory: Negative.     Cardiovascular: Negative.    Psychiatric/Behavioral:  Positive for dysphoric mood. The patient is nervous/anxious.        OBJECTIVE    Temp:  [96.8 °F (36 °C)-97.5 °F (36.4 °C)] 96.8 °F (36 °C)  Heart Rate:  [71-79] 79  Resp:  [16-18] 18  BP: (153-163)/(84-99) 153/99    MENTAL STATUS EXAM:  Appearance:Casually dressed, good hygeine.   Cooperation:Cooperative  Psychomotor: No psychomotor agitation/retardation, No EPS, No motor tics  Speech-normal rate, amount.  Mood \"anxious\"   Affect-congruent, appropriate, stable  Thought Content-goal directed, no delusional material present  Thought process-linear, organized.  Suicidality: No SI  Homicidality: No HI  Perception: No AH/VH  Insight-fair   Judgement-fair    Lab Results (last 24 hours)       ** No results found for the last 24 hours. **               Imaging Results (Last 24 Hours)       ** No results found for the last 24 hours. **               ECG/EMG Results (most recent)       None             ALLERGIES: Patient has no known allergies.    Medication Review:   Scheduled Medications:  atorvastatin, 40 mg, Oral, Nightly  Brexpiprazole, 1 mg, Oral, Daily  cetirizine, 10 mg, Oral, Daily  levothyroxine, 50 mcg, Oral, Q AM  metoprolol succinate XL, 25 mg, Oral, Daily  venlafaxine XR, 225 mg, Oral, Daily         PRN Medications:    acetaminophen    aluminum-magnesium hydroxide-simethicone    benzonatate    bisacodyl    gabapentin    ibuprofen    loperamide    magnesium hydroxide    melatonin    ondansetron " ODT    sodium chloride   All medications reviewed.    ASSESSMENT & PLAN:      Suicidal deliberate poisoning, initial encounter  -SP 3       MDD (major depressive disorder), recurrent severe, without psychosis  -Continue venlafaxine  mg daily  -Continue Rexulti 1 mg daily       Hypertension  -Metoprolol XL 25 mg daily      Hyperlipidemia  -Lipitor       Hypothyroidism following radioiodine therapy  -Levothyroxine 50 mcg daily       Tetrahydrocannabinol (THC) use disorder, severe, dependence  -Supportive treatment       Nicotine use disorder  -Encouraged cessation  -Nicotine replacement    Special precautions: Special Precautions Level 3 (q15 min checks) .    Behavioral Health Treatment Plan and Problem List: I have reviewed and approved the Behavioral Health Treatment Plan and Problem list.  The patient has had a chance to review and agrees with the treatment plan.    Copied text in portions of this note has been reviewed and is accurate as of 09/08/24         Clinician:  Zia Cowan MD  09/08/24  15:32 EDT

## 2024-09-09 LAB
QT INTERVAL: 396 MS
QT INTERVAL: 420 MS
QTC INTERVAL: 465 MS
QTC INTERVAL: 481 MS

## 2024-09-09 PROCEDURE — 99232 SBSQ HOSP IP/OBS MODERATE 35: CPT | Performed by: PSYCHIATRY & NEUROLOGY

## 2024-09-09 RX ORDER — AMLODIPINE BESYLATE 5 MG/1
5 TABLET ORAL
Status: DISCONTINUED | OUTPATIENT
Start: 2024-09-09 | End: 2024-09-11 | Stop reason: HOSPADM

## 2024-09-09 RX ORDER — HYDROXYZINE HYDROCHLORIDE 25 MG/1
25 TABLET, FILM COATED ORAL 3 TIMES DAILY PRN
Status: DISCONTINUED | OUTPATIENT
Start: 2024-09-09 | End: 2024-09-11 | Stop reason: HOSPADM

## 2024-09-09 RX ADMIN — ACETAMINOPHEN 650 MG: 325 TABLET ORAL at 07:49

## 2024-09-09 RX ADMIN — IBUPROFEN 400 MG: 400 TABLET, FILM COATED ORAL at 15:47

## 2024-09-09 RX ADMIN — LEVOTHYROXINE SODIUM 50 MCG: 0.05 TABLET ORAL at 06:06

## 2024-09-09 RX ADMIN — METOPROLOL SUCCINATE 25 MG: 25 TABLET, EXTENDED RELEASE ORAL at 08:09

## 2024-09-09 RX ADMIN — CETIRIZINE HYDROCHLORIDE 10 MG: 10 TABLET, FILM COATED ORAL at 08:09

## 2024-09-09 RX ADMIN — AMLODIPINE BESYLATE 5 MG: 5 TABLET ORAL at 08:24

## 2024-09-09 RX ADMIN — ATORVASTATIN CALCIUM 40 MG: 40 TABLET, FILM COATED ORAL at 20:11

## 2024-09-09 RX ADMIN — VENLAFAXINE HYDROCHLORIDE 225 MG: 75 CAPSULE, EXTENDED RELEASE ORAL at 08:09

## 2024-09-09 RX ADMIN — Medication 5 MG: at 20:11

## 2024-09-09 RX ADMIN — BREXPIPRAZOLE 1 MG: 1 TABLET ORAL at 08:09

## 2024-09-09 RX ADMIN — HYDROXYZINE HYDROCHLORIDE 25 MG: 25 TABLET ORAL at 08:24

## 2024-09-09 RX ADMIN — HYDROXYZINE HYDROCHLORIDE 25 MG: 25 TABLET ORAL at 15:47

## 2024-09-09 NOTE — NURSING NOTE
Notified Dr. Cowan of patients elevated BP this AM and anxiety and requiring PRN meds through the night. New order ntd for Atarax 25mg po TID PRN and Norvasc 5mg po daily. Med nurse aware.

## 2024-09-09 NOTE — PLAN OF CARE
Goal Outcome Evaluation:  Plan of Care Reviewed With: patient  Patient Agreement with Plan of Care: agrees     Progress: improving  Outcome Evaluation: Calm and cooperative. Reports fair appetite and good sleep. Rates anxiety 5, depression 3. Denies SI/HI/AVH. Has slept approximately 6.5 hours at this point in shift.

## 2024-09-09 NOTE — PROGRESS NOTES
"INPATIENT PSYCHIATRIC PROGRESS NOTE    Name:  Tarsha White  :  1965  MRN:  [id number]  Visit Number:  [id number]  Length of stay:  3    SUBJECTIVE    CC/Focus of Exam: SI, SA, MDD    INTERVAL HISTORY:  The patient states she is feeling more anxious and at first she was not sure why but then reflected that she is worried about her sister who is in hospice care. She was administered hydroxyzine and it has helped.   Depression rating 4/10  Anxiety rating 8/10  Sleep: fair  Withdrawal sx: None   Cravin/10    Review of Systems   Constitutional: Negative.    Respiratory: Negative.     Cardiovascular: Negative.    Psychiatric/Behavioral:  Positive for dysphoric mood. The patient is nervous/anxious.        OBJECTIVE    Temp:  [97.6 °F (36.4 °C)-98.1 °F (36.7 °C)] 98.1 °F (36.7 °C)  Heart Rate:  [55-86] 74  Resp:  [16] 16  BP: (145-191)/() 145/80    MENTAL STATUS EXAM:  Appearance:Casually dressed, good hygeine.   Cooperation:Cooperative  Psychomotor: No psychomotor agitation/retardation, No EPS, No motor tics  Speech-normal rate, amount.  Mood \"anxious\"   Affect-congruent, appropriate, stable  Thought Content-goal directed, no delusional material present  Thought process-linear, organized.  Suicidality: No SI  Homicidality: No HI  Perception: No AH/VH  Insight-fair   Judgement-fair    Lab Results (last 24 hours)       ** No results found for the last 24 hours. **               Imaging Results (Last 24 Hours)       ** No results found for the last 24 hours. **               ECG/EMG Results (most recent)       None             ALLERGIES: Patient has no known allergies.    Medication Review:   Scheduled Medications:  amLODIPine, 5 mg, Oral, Q24H  atorvastatin, 40 mg, Oral, Nightly  Brexpiprazole, 1 mg, Oral, Daily  cetirizine, 10 mg, Oral, Daily  levothyroxine, 50 mcg, Oral, Q AM  metoprolol succinate XL, 25 mg, Oral, Daily  venlafaxine XR, 225 mg, Oral, Daily         PRN Medications:    acetaminophen    " aluminum-magnesium hydroxide-simethicone    benzonatate    bisacodyl    gabapentin    hydrOXYzine    ibuprofen    loperamide    magnesium hydroxide    melatonin    ondansetron ODT    sodium chloride   All medications reviewed.    ASSESSMENT & PLAN:      Suicidal deliberate poisoning, initial encounter  -SP 3       MDD (major depressive disorder), recurrent severe, without psychosis  -Continue venlafaxine  mg daily  -Continue Rexulti 1 mg daily      Anxiety disorder unspecified  -Hydroxyzine 25 mg tid as needed       Hypertension  -Metoprolol XL 25 mg daily  -Add amlodipine 5 mg daily      Hyperlipidemia  -Lipitor       Hypothyroidism following radioiodine therapy  -Levothyroxine 50 mcg daily       Tetrahydrocannabinol (THC) use disorder, severe, dependence  -Supportive treatment       Nicotine use disorder  -Encouraged cessation  -Nicotine replacement    Special precautions: Special Precautions Level 3 (q15 min checks) .    Behavioral Health Treatment Plan and Problem List: I have reviewed and approved the Behavioral Health Treatment Plan and Problem list.  The patient has had a chance to review and agrees with the treatment plan.    Copied text in portions of this note has been reviewed and is accurate as of 09/09/24         Clinician:  Zia Cowan MD  09/09/24  14:32 EDT

## 2024-09-10 PROCEDURE — 99232 SBSQ HOSP IP/OBS MODERATE 35: CPT | Performed by: PSYCHIATRY & NEUROLOGY

## 2024-09-10 RX ORDER — NICOTINE 21 MG/24HR
1 PATCH, TRANSDERMAL 24 HOURS TRANSDERMAL
Status: DISCONTINUED | OUTPATIENT
Start: 2024-09-10 | End: 2024-09-11 | Stop reason: HOSPADM

## 2024-09-10 RX ADMIN — METOPROLOL SUCCINATE 25 MG: 25 TABLET, EXTENDED RELEASE ORAL at 09:00

## 2024-09-10 RX ADMIN — BREXPIPRAZOLE 1 MG: 1 TABLET ORAL at 09:00

## 2024-09-10 RX ADMIN — LEVOTHYROXINE SODIUM 50 MCG: 0.05 TABLET ORAL at 06:03

## 2024-09-10 RX ADMIN — VENLAFAXINE HYDROCHLORIDE 225 MG: 75 CAPSULE, EXTENDED RELEASE ORAL at 09:00

## 2024-09-10 RX ADMIN — ACETAMINOPHEN 650 MG: 325 TABLET ORAL at 10:53

## 2024-09-10 RX ADMIN — Medication 5 MG: at 20:13

## 2024-09-10 RX ADMIN — AMLODIPINE BESYLATE 5 MG: 5 TABLET ORAL at 09:00

## 2024-09-10 RX ADMIN — ACETAMINOPHEN 650 MG: 325 TABLET ORAL at 20:15

## 2024-09-10 RX ADMIN — NICOTINE 1 PATCH: 21 PATCH TRANSDERMAL at 09:39

## 2024-09-10 RX ADMIN — CETIRIZINE HYDROCHLORIDE 10 MG: 10 TABLET, FILM COATED ORAL at 08:59

## 2024-09-10 RX ADMIN — ATORVASTATIN CALCIUM 40 MG: 40 TABLET, FILM COATED ORAL at 20:13

## 2024-09-10 NOTE — PROGRESS NOTES
"INPATIENT PSYCHIATRIC PROGRESS NOTE    Name:  Tarsha White  :  1965  MRN:  [id number]  Visit Number:  [id number]  Length of stay:  4    SUBJECTIVE    CC/Focus of Exam: SI, SA, MDD    INTERVAL HISTORY:  The patient states she is \"a little sad\" and was tearful, because she was talking to her youngest daughter over the phone and discussed her sister's condition where she has been put in hospice care as nothing can be done about it, she has twelve tumors inside her head. She states it is hard to believe and deal with, as the patient had just seen her sister two days before they found out that she had a terminal condition. Her sister is a year younger to the patient and patient feels it is very unfair. She states she felt better after she cried.   Depression rating 5/10  Anxiety rating 5/10  Sleep: fair  Withdrawal sx: None   Cravin/10    Review of Systems   Constitutional: Negative.    Respiratory: Negative.     Cardiovascular: Negative.    Psychiatric/Behavioral:  Positive for dysphoric mood. The patient is nervous/anxious.        OBJECTIVE    Temp:  [97.1 °F (36.2 °C)-97.5 °F (36.4 °C)] 97.5 °F (36.4 °C)  Heart Rate:  [66-99] 66  Resp:  [16-18] 18  BP: (135-164)/(88-99) 160/88    MENTAL STATUS EXAM:  Appearance:Casually dressed, good hygeine.   Cooperation:Cooperative  Psychomotor: No psychomotor agitation/retardation, No EPS, No motor tics  Speech-normal rate, amount.  Mood \"anxious\"   Affect-congruent, appropriate, stable  Thought Content-goal directed, no delusional material present  Thought process-linear, organized.  Suicidality: No SI  Homicidality: No HI  Perception: No AH/VH  Insight-fair   Judgement-fair    Lab Results (last 24 hours)       ** No results found for the last 24 hours. **               Imaging Results (Last 24 Hours)       ** No results found for the last 24 hours. **               ECG/EMG Results (most recent)       None             ALLERGIES: Patient has no known " allergies.    Medication Review:   Scheduled Medications:  amLODIPine, 5 mg, Oral, Q24H  atorvastatin, 40 mg, Oral, Nightly  Brexpiprazole, 1 mg, Oral, Daily  cetirizine, 10 mg, Oral, Daily  levothyroxine, 50 mcg, Oral, Q AM  metoprolol succinate XL, 25 mg, Oral, Daily  nicotine, 1 patch, Transdermal, Q24H  venlafaxine XR, 225 mg, Oral, Daily         PRN Medications:    acetaminophen    aluminum-magnesium hydroxide-simethicone    benzonatate    bisacodyl    gabapentin    hydrOXYzine    ibuprofen    loperamide    magnesium hydroxide    melatonin    ondansetron ODT    sodium chloride   All medications reviewed.    ASSESSMENT & PLAN:      Suicidal deliberate poisoning, initial encounter  -SP 3       MDD (major depressive disorder), recurrent severe, without psychosis  -Continue venlafaxine  mg daily  -Continue Rexulti 1 mg daily      Anxiety disorder unspecified  -Hydroxyzine 25 mg tid as needed       Hypertension  -Metoprolol XL 25 mg daily  -Add amlodipine 5 mg daily      Hyperlipidemia  -Lipitor       Hypothyroidism following radioiodine therapy  -Levothyroxine 50 mcg daily       Tetrahydrocannabinol (THC) use disorder, severe, dependence  -Supportive treatment       Nicotine use disorder  -Encouraged cessation  -Nicotine replacement    Special precautions: Special Precautions Level 3 (q15 min checks) .    Behavioral Health Treatment Plan and Problem List: I have reviewed and approved the Behavioral Health Treatment Plan and Problem list.  The patient has had a chance to review and agrees with the treatment plan.    Copied text in portions of this note has been reviewed and is accurate as of 09/10/24         Clinician:  Zia Cowan MD  09/10/24  12:41 EDT

## 2024-09-10 NOTE — PLAN OF CARE
Goal Outcome Evaluation:        Consent Given to Review Plan with: Tammy Cedeño, Daughter  Progress: improving  Outcome Evaluation: Therapist met with patient to review care plan, social history, aftercare recommendation, and disposition plan; patient agreeable.      Problem: Adult Behavioral Health Plan of Care  Goal: Patient-Specific Goal (Individualization)  Outcome: Ongoing, Progressing  Flowsheets  Taken 9/6/2024 1149  Patient-Specific Goals (Include Timeframe): Identify 2-3 coping skills, complete aftercare plan, complete safety plan, and deny SI/HI within 1-7 days.  Individualized Care Needs: Therapist to offer 1-4 therapy sessions, aftercare planning, safety planning, family education, daily groups and brief CBT/MI interventions.  Anxieties, Fears or Concerns: None stated  Taken 9/6/2024 1125  Patient Personal Strengths:   resourceful   resilient   self-reliant  Patient Vulnerabilities:   lacks insight into illness   adverse childhood experience(s)  Goal: Optimized Coping Skills in Response to Life Stressors  Outcome: Ongoing, Progressing  Flowsheets (Taken 9/6/2024 1149)  Optimized Coping Skills in Response to Life Stressors: making progress toward outcome  Intervention: Promote Effective Coping Strategies  Flowsheets (Taken 9/10/2024 0858)  Supportive Measures:   active listening utilized   positive reinforcement provided   verbalization of feelings encouraged  Goal: Develops/Participates in Therapeutic [place] to Support Successful Transition  Outcome: Ongoing, Progressing  Flowsheets (Taken 9/6/2024 1149)  Develops/Participates in Therapeutic [place] to Support Successful Transition: making progress toward outcome  Intervention: Foster Therapeutic [place]  Flowsheets (Taken 9/10/2024 0858)  Trust Relationship/Rapport:   care explained   choices provided   emotional support provided   questions answered   empathic listening provided   questions encouraged   thoughts/feelings acknowledged    reassurance provided  Intervention: Mutually Develop Transition Plan  Flowsheets  Taken 9/6/2024 1149 by Rene Hermosillo  Outpatient/Agency/Support Group Needs:   outpatient medication management   outpatient psychiatric care (specify)   outpatient counseling  Discharge Coordination/Progress:   Therapist met with patient to complete discharge needs assessment   patient uncertain of aftercare at this time.  Transition Support:   crisis management plan promoted   crisis management plan verbalized   follow-up care discussed  Anticipated Discharge Disposition: home or self-care  Concerns to be Addressed: mental health  Readmission Within the Last 30 Days: no previous admission in last 30 days  Offered/Gave Vendor List: no  Taken 9/6/2024 0813 by Rigoberto Tom RN  Transportation Anticipated: car, drives self  Patient/Family Anticipated Services at Transition: none  Patient/Family Anticipates Transition to: home     DATA: Therapist met with Patient individually this date. Patient agreeable to discuss current treatment progress and discharge concerns.     CLINICAL MANUVERING/INTERVENTIONS:  Assisted Patient in processing session content; acknowledged and normalized Patient’s thoughts, feelings, and concerns by utilizing a person-centered approach in efforts to build appropriate rapport and a positive therapeutic relationship with open and honest communication. Allowed Patient to ventilate regarding current stressors and triggers for negative emotions and thoughts in a safe nonjudgmental environment with unconditional positive regard, active listening skills, and empathy.     ASSESSMENT: Patient is a 59 year old female who presented to the ED following an attempted overdose. Patient reports that she is feeling better at this time and is optimistic about getting out soon. Patient states she wants to go home so she can spend time with her family. Patient states she has spoken to her daughter who has been keeping her  up-to-date with the family situations. Patient reports that she especially wants to get home to spend time with her sister who is in hospice. Patient reports anxiety at 4/10 and depression 3/10. Patient denies SI/HI/AVH.      PLAN:  Patient will continue stabilization. Patient will continue to receive services offered by Treatment Team.     Patient will follow-up with [facility].     Assistance with Transportation will not be needed.

## 2024-09-10 NOTE — PLAN OF CARE
Goal Outcome Evaluation:  Plan of Care Reviewed With: patient  Patient Agreement with Plan of Care: agrees     Progress: improving     Pt reports fair sleep and good appetite. Pt rates anx 3/10 and dep 3/10. Pt denies SI/HI/AVH.

## 2024-09-10 NOTE — DISCHARGE INSTR - APPOINTMENTS
September 12 at 8 am    Medication Management  [facility]   [address], [place] [address]  [phone]      Oct 15, 2024 at 10 am    Deloris Cano  [facility]   [address], [place] [address]  [phone]  Oct 15, 2024 @ 10 am

## 2024-09-10 NOTE — PLAN OF CARE
Goal Outcome Evaluation:  Plan of Care Reviewed With: patient  Patient Agreement with Plan of Care: agrees     Progress: improving  Outcome Evaluation: Pt has been calm and cooperative this shift. Pt rates anxiety 4/10 and depression 5/10. Pt denies SI/HI/AVH. Pt reports good sleep and good appetite. Pt has had no complaints this shift.

## 2024-09-11 VITALS
HEART RATE: 98 BPM | SYSTOLIC BLOOD PRESSURE: 144 MMHG | WEIGHT: 138 LBS | RESPIRATION RATE: 18 BRPM | HEIGHT: 63 IN | BODY MASS INDEX: 24.45 KG/M2 | TEMPERATURE: 97.7 F | OXYGEN SATURATION: 99 % | DIASTOLIC BLOOD PRESSURE: 83 MMHG

## 2024-09-11 PROCEDURE — 99239 HOSP IP/OBS DSCHRG MGMT >30: CPT | Performed by: PSYCHIATRY & NEUROLOGY

## 2024-09-11 RX ORDER — AMLODIPINE BESYLATE 5 MG/1
5 TABLET ORAL
Qty: 30 TABLET | Refills: 0 | Status: SHIPPED | OUTPATIENT
Start: 2024-09-12

## 2024-09-11 RX ADMIN — IBUPROFEN 400 MG: 400 TABLET, FILM COATED ORAL at 10:14

## 2024-09-11 RX ADMIN — CETIRIZINE HYDROCHLORIDE 10 MG: 10 TABLET, FILM COATED ORAL at 08:25

## 2024-09-11 RX ADMIN — AMLODIPINE BESYLATE 5 MG: 5 TABLET ORAL at 08:25

## 2024-09-11 RX ADMIN — BREXPIPRAZOLE 1 MG: 1 TABLET ORAL at 08:25

## 2024-09-11 RX ADMIN — NICOTINE 1 PATCH: 21 PATCH TRANSDERMAL at 08:25

## 2024-09-11 RX ADMIN — METOPROLOL SUCCINATE 25 MG: 25 TABLET, EXTENDED RELEASE ORAL at 08:25

## 2024-09-11 RX ADMIN — LEVOTHYROXINE SODIUM 50 MCG: 0.05 TABLET ORAL at 05:56

## 2024-09-11 RX ADMIN — VENLAFAXINE HYDROCHLORIDE 225 MG: 75 CAPSULE, EXTENDED RELEASE ORAL at 08:25

## 2024-09-11 NOTE — DISCHARGE SUMMARY
":  1965  MRN:  [id number]  Visit Number:  [id number]      Date of Admission:2024   Date of Discharge:  2024    Discharge Diagnosis:  Principal Problem:    Suicidal deliberate poisoning, initial encounter  Active Problems:    Hypertension    Hypothyroidism following radioiodine therapy    MDD (major depressive disorder), recurrent severe, without psychosis    Tetrahydrocannabinol (THC) use disorder, severe, dependence        Admission Diagnosis:  Suicidal deliberate poisoning, initial encounter [T65.92XA]     HPI   Tarsha White is a 59 y.o. female who was admitted on 2024 with complaints of suicide attempt with and overdose on Seroquel pills.   For details please see H&P dated 24.     Hospital Course  Patient is a 59 y.o. female presented with an overdose on Seroquel pills. The patient was admitted to the [facility] senior psych unit for safety, further evaluation and treatment.  The patient was continued on her home medication except quetiapine which was not continued as patient had taken an overdose.  The patient was noted to have elevated blood pressure and amlodipine was added.   The patient was also able to take part in individual and group counseling sessions and work on appropriate coping skills.  The patient made steady improvement in her mood and expressed feeling more positive and hopeful about future. Sleep and appetite were improved.  The day of discharge the patient was calm, cooperative and pleasant. Mood was reported to be good, and denied SI/HI/AVH. Also reported no medication side effects.        Mental Status Exam upon discharge:   Mood \"good\"   Affect-congruent, appropriate, stable  Thought Content-goal directed, no delusional material present  Thought process-linear, organized.  Suicidality: No SI  Homicidality: No HI  Perception: No /    Procedures Performed         Consults:   Consults       No orders found from 2024 to 2024.            Pertinent Test " Results:   Admission on 09/06/2024   Component Date Value Ref Range Status    Hepatitis B Surface Ag 09/06/2024 Non-Reactive  Non-Reactive Final    Hep A IgM 09/06/2024 Non-Reactive  Non-Reactive Final    Hep B C IgM 09/06/2024 Non-Reactive  Non-Reactive Final    Hepatitis C Ab 09/06/2024 Non-Reactive  Non-Reactive Final    Total Cholesterol 09/07/2024 118  0 - 200 mg/dL Final    Triglycerides 09/07/2024 94  0 - 150 mg/dL Final    HDL Cholesterol 09/07/2024 40  40 - 60 mg/dL Final    LDL Cholesterol  09/07/2024 60  0 - 100 mg/dL Final    VLDL Cholesterol 09/07/2024 18  5 - 40 mg/dL Final    LDL/HDL Ratio 09/07/2024 1.48   Final    Hemoglobin A1C 09/07/2024 5.70 (H)  4.80 - 5.60 % Final   Admission on 09/05/2024, Discharged on 09/06/2024   Component Date Value Ref Range Status    QT Interval 09/05/2024 420  ms Final    QTC Interval 09/05/2024 481  ms Final    Glucose 09/05/2024 110 (H)  65 - 99 mg/dL Final    BUN 09/05/2024 15  8 - 23 mg/dL Final    Creatinine 09/05/2024 1.15 (H)  0.57 - 1.00 mg/dL Final    Sodium 09/05/2024 136  136 - 145 mmol/L Final    Potassium 09/05/2024 3.8  3.5 - 5.2 mmol/L Final    Chloride 09/05/2024 103  98 - 107 mmol/L Final    CO2 09/05/2024 20.8 (L)  22.0 - 29.0 mmol/L Final    Calcium 09/05/2024 8.8  8.6 - 10.5 mg/dL Final    Total Protein 09/05/2024 6.3  6.0 - 8.5 g/dL Final    Albumin 09/05/2024 3.9  3.5 - 5.2 g/dL Final    ALT (SGPT) 09/05/2024 14  1 - 33 U/L Final    AST (SGOT) 09/05/2024 20  1 - 32 U/L Final    Alkaline Phosphatase 09/05/2024 80  39 - 117 U/L Final    Total Bilirubin 09/05/2024 0.2  0.0 - 1.2 mg/dL Final    Globulin 09/05/2024 2.4  gm/dL Final    A/G Ratio 09/05/2024 1.6  g/dL Final    BUN/Creatinine Ratio 09/05/2024 13.0  7.0 - 25.0 Final    Anion Gap 09/05/2024 12.2  5.0 - 15.0 mmol/L Final    eGFR 09/05/2024 54.6 (L)  >60.0 mL/min/1.73 Final    Acetaminophen 09/05/2024 <5.0  0.0 - 30.0 mcg/mL Final    Ethanol 09/05/2024 <10  0 - 10 mg/dL Final    Ethanol %  09/05/2024 <0.010  % Final    Salicylate 09/05/2024 <0.3  <=30.0 mg/dL Final    THC, Screen, Urine 09/05/2024 Positive (A)  Negative Final    Phencyclidine (PCP), Urine 09/05/2024 Negative  Negative Final    Cocaine Screen, Urine 09/05/2024 Positive (A)  Negative Final    Methamphetamine, Ur 09/05/2024 Negative  Negative Final    Opiate Screen 09/05/2024 Negative  Negative Final    Amphetamine Screen, Urine 09/05/2024 Negative  Negative Final    Benzodiazepine Screen, Urine 09/05/2024 Positive (A)  Negative Final    Tricyclic Antidepressants Screen 09/05/2024 Positive (A)  Negative Final    Methadone Screen, Urine 09/05/2024 Negative  Negative Final    Barbiturates Screen, Urine 09/05/2024 Negative  Negative Final    Oxycodone Screen, Urine 09/05/2024 Negative  Negative Final    Buprenorphine, Screen, Urine 09/05/2024 Negative  Negative Final    WBC 09/05/2024 13.70 (H)  3.40 - 10.80 10*3/mm3 Final    RBC 09/05/2024 4.09  3.77 - 5.28 10*6/mm3 Final    Hemoglobin 09/05/2024 12.5  12.0 - 15.9 g/dL Final    Hematocrit 09/05/2024 37.9  34.0 - 46.6 % Final    MCV 09/05/2024 92.7  79.0 - 97.0 fL Final    MCH 09/05/2024 30.6  26.6 - 33.0 pg Final    MCHC 09/05/2024 33.0  31.5 - 35.7 g/dL Final    RDW 09/05/2024 13.4  12.3 - 15.4 % Final    RDW-SD 09/05/2024 45.8  37.0 - 54.0 fl Final    MPV 09/05/2024 9.8  6.0 - 12.0 fL Final    Platelets 09/05/2024 186  140 - 450 10*3/mm3 Final    Neutrophil % 09/05/2024 87.9 (H)  42.7 - 76.0 % Final    Lymphocyte % 09/05/2024 7.3 (L)  19.6 - 45.3 % Final    Monocyte % 09/05/2024 3.6 (L)  5.0 - 12.0 % Final    Eosinophil % 09/05/2024 0.1 (L)  0.3 - 6.2 % Final    Basophil % 09/05/2024 0.4  0.0 - 1.5 % Final    Immature Grans % 09/05/2024 0.7 (H)  0.0 - 0.5 % Final    Neutrophils, Absolute 09/05/2024 12.05 (H)  1.70 - 7.00 10*3/mm3 Final    Lymphocytes, Absolute 09/05/2024 1.00  0.70 - 3.10 10*3/mm3 Final    Monocytes, Absolute 09/05/2024 0.50  0.10 - 0.90 10*3/mm3 Final    Eosinophils,  Absolute 09/05/2024 0.01  0.00 - 0.40 10*3/mm3 Final    Basophils, Absolute 09/05/2024 0.05  0.00 - 0.20 10*3/mm3 Final    Immature Grans, Absolute 09/05/2024 0.09 (H)  0.00 - 0.05 10*3/mm3 Final    nRBC 09/05/2024 0.0  0.0 - 0.2 /100 WBC Final    Glucose 09/05/2024 118  70 - 130 mg/dL Final    Fentanyl, Urine 09/05/2024 Negative  Negative Final    QT Interval 09/05/2024 396  ms Final    QTC Interval 09/05/2024 465  ms Final    Color, UA 09/06/2024 Yellow  Yellow, Straw Final    Appearance, UA 09/06/2024 Clear  Clear Final    pH, UA 09/06/2024 6.0  5.0 - 8.0 Final    Specific Gravity, UA 09/06/2024 1.008  1.005 - 1.030 Final    Glucose, UA 09/06/2024 Negative  Negative Final    Ketones, UA 09/06/2024 Trace (A)  Negative Final    Bilirubin, UA 09/06/2024 Negative  Negative Final    Blood, UA 09/06/2024 Trace (A)  Negative Final    Protein, UA 09/06/2024 Negative  Negative Final    Leuk Esterase, UA 09/06/2024 Negative  Negative Final    Nitrite, UA 09/06/2024 Negative  Negative Final    Urobilinogen, UA 09/06/2024 0.2 E.U./dL  0.2 - 1.0 E.U./dL Final    Magnesium 09/05/2024 2.0  1.6 - 2.4 mg/dL Final    RBC, UA 09/06/2024 3-5 (A)  None Seen, 0-2 /HPF Final    WBC, UA 09/06/2024 0-2  None Seen, 0-2 /HPF Final    Bacteria, UA 09/06/2024 None Seen  None Seen /HPF Final    Squamous Epithelial Cells, UA 09/06/2024 0-2  None Seen, 0-2 /HPF Final    Hyaline Casts, UA 09/06/2024 None Seen  None Seen /LPF Final    Methodology 09/06/2024 Automated Microscopy   Final   Admission on 08/19/2024, Discharged on 08/19/2024   Component Date Value Ref Range Status    Glucose 08/19/2024 108 (H)  65 - 99 mg/dL Final    BUN 08/19/2024 6 (L)  8 - 23 mg/dL Final    Creatinine 08/19/2024 0.82  0.57 - 1.00 mg/dL Final    Sodium 08/19/2024 136  136 - 145 mmol/L Final    Potassium 08/19/2024 4.0  3.5 - 5.2 mmol/L Final    Slight hemolysis detected by analyzer. Result may be falsely elevated.    Chloride 08/19/2024 102  98 - 107 mmol/L Final     CO2 08/19/2024 22.0  22.0 - 29.0 mmol/L Final    Calcium 08/19/2024 9.3  8.6 - 10.5 mg/dL Final    Total Protein 08/19/2024 7.1  6.0 - 8.5 g/dL Final    Albumin 08/19/2024 4.4  3.5 - 5.2 g/dL Final    ALT (SGPT) 08/19/2024 17  1 - 33 U/L Final    AST (SGOT) 08/19/2024 27  1 - 32 U/L Final    Alkaline Phosphatase 08/19/2024 96  39 - 117 U/L Final    Total Bilirubin 08/19/2024 0.2  0.0 - 1.2 mg/dL Final    Globulin 08/19/2024 2.7  gm/dL Final    A/G Ratio 08/19/2024 1.6  g/dL Final    BUN/Creatinine Ratio 08/19/2024 7.3  7.0 - 25.0 Final    Anion Gap 08/19/2024 12.0  5.0 - 15.0 mmol/L Final    eGFR 08/19/2024 82.0  >60.0 mL/min/1.73 Final    Lipase 08/19/2024 36  13 - 60 U/L Final    Color, UA 08/19/2024 Yellow  Yellow, Straw Final    Appearance, UA 08/19/2024 Clear  Clear Final    pH, UA 08/19/2024 6.0  5.0 - 8.0 Final    Specific Gravity, UA 08/19/2024 1.006  1.005 - 1.030 Final    Glucose, UA 08/19/2024 Negative  Negative Final    Ketones, UA 08/19/2024 15 mg/dL (1+) (A)  Negative Final    Bilirubin, UA 08/19/2024 Negative  Negative Final    Blood, UA 08/19/2024 Moderate (2+) (A)  Negative Final    Protein, UA 08/19/2024 Negative  Negative Final    Leuk Esterase, UA 08/19/2024 Negative  Negative Final    Nitrite, UA 08/19/2024 Negative  Negative Final    Urobilinogen, UA 08/19/2024 0.2 E.U./dL  0.2 - 1.0 E.U./dL Final    Magnesium 08/19/2024 2.0  1.6 - 2.4 mg/dL Final    WBC 08/19/2024 10.95 (H)  3.40 - 10.80 10*3/mm3 Final    RBC 08/19/2024 5.21  3.77 - 5.28 10*6/mm3 Final    Hemoglobin 08/19/2024 15.8  12.0 - 15.9 g/dL Final    Hematocrit 08/19/2024 46.7 (H)  34.0 - 46.6 % Final    MCV 08/19/2024 89.6  79.0 - 97.0 fL Final    MCH 08/19/2024 30.3  26.6 - 33.0 pg Final    MCHC 08/19/2024 33.8  31.5 - 35.7 g/dL Final    RDW 08/19/2024 13.5  12.3 - 15.4 % Final    RDW-SD 08/19/2024 44.7  37.0 - 54.0 fl Final    MPV 08/19/2024 9.8  6.0 - 12.0 fL Final    Platelets 08/19/2024 228  140 - 450 10*3/mm3 Final    Neutrophil  % 08/19/2024 70.8  42.7 - 76.0 % Final    Lymphocyte % 08/19/2024 20.5  19.6 - 45.3 % Final    Monocyte % 08/19/2024 6.4  5.0 - 12.0 % Final    Eosinophil % 08/19/2024 1.4  0.3 - 6.2 % Final    Basophil % 08/19/2024 0.5  0.0 - 1.5 % Final    Immature Grans % 08/19/2024 0.4  0.0 - 0.5 % Final    Neutrophils, Absolute 08/19/2024 7.76 (H)  1.70 - 7.00 10*3/mm3 Final    Lymphocytes, Absolute 08/19/2024 2.25  0.70 - 3.10 10*3/mm3 Final    Monocytes, Absolute 08/19/2024 0.70  0.10 - 0.90 10*3/mm3 Final    Eosinophils, Absolute 08/19/2024 0.15  0.00 - 0.40 10*3/mm3 Final    Basophils, Absolute 08/19/2024 0.05  0.00 - 0.20 10*3/mm3 Final    Immature Grans, Absolute 08/19/2024 0.04  0.00 - 0.05 10*3/mm3 Final    nRBC 08/19/2024 0.0  0.0 - 0.2 /100 WBC Final    Extra Tube 08/19/2024 Hold for add-ons.   Final    Auto resulted.    Extra Tube 08/19/2024 hold for add-on   Final    Auto resulted    Extra Tube 08/19/2024 Hold for add-ons.   Final    Auto resulted.    Extra Tube 08/19/2024 Hold for add-ons.   Final    Auto resulted    RBC, UA 08/19/2024 6-10 (A)  None Seen, 0-2 /HPF Final    WBC, UA 08/19/2024 0-2  None Seen, 0-2 /HPF Final    Bacteria, UA 08/19/2024 None Seen  None Seen /HPF Final    Squamous Epithelial Cells, UA 08/19/2024 0-2  None Seen, 0-2 /HPF Final    Hyaline Casts, UA 08/19/2024 None Seen  None Seen /LPF Final    Methodology 08/19/2024 Manual Light Microscopy   Final    Ethanol 08/19/2024 <10  0 - 10 mg/dL Final    Ethanol % 08/19/2024 <0.010  % Final    THC, Screen, Urine 08/19/2024 Positive (A)  Negative Final    Phencyclidine (PCP), Urine 08/19/2024 Negative  Negative Final    Cocaine Screen, Urine 08/19/2024 Negative  Negative Final    Methamphetamine, Ur 08/19/2024 Negative  Negative Final    Opiate Screen 08/19/2024 Negative  Negative Final    Amphetamine Screen, Urine 08/19/2024 Negative  Negative Final    Benzodiazepine Screen, Urine 08/19/2024 Negative  Negative Final    Tricyclic Antidepressants  Screen 08/19/2024 Negative  Negative Final    Methadone Screen, Urine 08/19/2024 Negative  Negative Final    Barbiturates Screen, Urine 08/19/2024 Negative  Negative Final    Oxycodone Screen, Urine 08/19/2024 Negative  Negative Final    Buprenorphine, Screen, Urine 08/19/2024 Negative  Negative Final    TSH 08/19/2024 2.670  0.270 - 4.200 uIU/mL Final    Fentanyl, Urine 08/19/2024 Negative  Negative Final        Condition on Discharge:  improved    Vital Signs  Temp:  [97.6 °F (36.4 °C)-97.7 °F (36.5 °C)] 97.7 °F (36.5 °C)  Heart Rate:  [73-98] 98  Resp:  [16-18] 18  BP: (138-154)/() 144/83      Discharge Disposition:  Home or Self Care    Discharge Medications:     Discharge Medications        New Medications        Instructions Start Date   amLODIPine 5 MG tablet  Commonly known as: NORVASC   5 mg, Oral, Every 24 Hours Scheduled   Start Date: September 12, 2024            Continue These Medications        Instructions Start Date   atorvastatin 40 MG tablet  Commonly known as: LIPITOR   40 mg, Oral, Nightly      gabapentin 600 MG tablet  Commonly known as: NEURONTIN   600 mg, Oral, 3 Times Daily PRN      levocetirizine 5 MG tablet  Commonly known as: XYZAL   5 mg, Oral, Every Evening      levothyroxine 50 MCG tablet  Commonly known as: SYNTHROID, LEVOTHROID   50 mcg, Oral, Every Early Morning      metoprolol succinate XL 25 MG 24 hr tablet  Commonly known as: TOPROL-XL   25 mg, Oral, Daily      Rexulti 1 MG tablet  Generic drug: Brexpiprazole   1 mg, Oral, Daily      traZODone 50 MG tablet  Commonly known as: DESYREL   50 mg, Oral, Nightly      venlafaxine XR 75 MG 24 hr capsule  Commonly known as: EFFEXOR-XR   225 mg, Oral, Daily             Stop These Medications      QUEtiapine 25 MG tablet  Commonly known as: SEROquel              Discharge Diet: Regular     Activity at Discharge: As tolerated     Follow-up Appointments    September 12 at 8 am     Medication Management  [address], [place] [address]  [phone]        Oct 15, 2024 at 10 am     [facility]   [address], [place] [address]  [phone]  Oct 15, 2024 @ 10 am         Time: I spent  > 30  minutes on this discharge activity which included: face-to-face encounter with the patient, reviewing the data in the system, coordination of the care with the nursing staff as well as consultants, documentation, and entering orders.        Clinician:   Zia Cowan MD  09/11/24  12:29 EDT

## 2024-09-11 NOTE — PLAN OF CARE
Goal Outcome Evaluation:  Plan of Care Reviewed With: patient  Patient Agreement with Plan of Care: agrees     Pt discharging home today.

## 2024-09-11 NOTE — PROGRESS NOTES
Discharge Planning Assessment  Psychiatric     Patient Name: Tarsha White  MRN: [id number]  Today's Date: 9/11/2024    Admit Date: 9/6/2024    Patient was discharged home today before this therapist was able to meet with her. She denied SI, HI, and AVH. Safety planning was completed with her daughter by primary therapist, Rene. Patient has aftercare arranged with [facility].         CLEO Yousif

## 2024-09-11 NOTE — PLAN OF CARE
Goal Outcome Evaluation:  Plan of Care Reviewed With: patient  Patient Agreement with Plan of Care: agrees     Progress: improving     Pt reports good sleep and good appetite. Pt rates anx 0/10 an dep 3/10. Pt denies SI/HI/AVH.

## 2024-09-12 NOTE — PAYOR COMM NOTE
"Tarsha White (59 y.o. Female)       Date of Birth   1965    Social Security Number       Address   [address] [facility] [address]    Home Phone   [phone]    MRN   [id number]       Christian   Non-Taoist    Marital Status                               Admission Date   24    Admission Type   Emergency    Admitting Provider   Jerri Molina MD    Attending Provider       Department, Room/Bed   [facility], 1025/1S       Discharge Date   2024    Discharge Disposition   Home or Self Care    Discharge Destination                                 Attending Provider: (none)   Allergies: No Known Allergies    Isolation: None   Infection: None   Code Status: Prior    Ht: 160 cm (63\")   Wt: 62.6 kg (138 lb)    Admission Cmt: None   Principal Problem: Suicidal deliberate poisoning, initial encounter [T65.92XA]                   Active Insurance as of 2024       Primary Coverage       Payor Plan Insurance Group Employer/Plan Group    [place] [id number]       Payor Plan Address Payor Plan Phone Number Payor Plan Fax Number Effective Dates    [employer] [address] [address] [phone]  2020 - None Entered    [facility] [address]         Subscriber Name Subscriber Birth Date Member ID       TARSHA WHITE 1965 [id number]                     Emergency Contacts        (Rel.) Home Phone Work Phone Mobile Phone    Tammy Cedeño (Daughter) [phone] -- [phone]                 Discharge Summary        Zia Cowan MD at 24 1229          :  1965  MRN:  [id number]  Visit Number:  [id number]      Date of Admission:2024   Date of Discharge:  2024    Discharge Diagnosis:  Principal Problem:    Suicidal deliberate poisoning, initial encounter  Active Problems:    Hypertension    Hypothyroidism following radioiodine therapy    MDD (major depressive disorder), recurrent " "severe, without psychosis    Tetrahydrocannabinol (THC) use disorder, severe, dependence        Admission Diagnosis:  Suicidal deliberate poisoning, initial encounter [T65.92XA]     HPI   Tarsha White is a 59 y.o. female who was admitted on 9/6/2024 with complaints of suicide attempt with and overdose on Seroquel pills.   For details please see H&P dated 9/6/24.     Hospital Course  Patient is a 59 y.o. female presented with an overdose on Seroquel pills. The patient was admitted to the [facility] senior psych unit for safety, further evaluation and treatment.  The patient was continued on her home medication except quetiapine which was not continued as patient had taken an overdose.  The patient was noted to have elevated blood pressure and amlodipine was added.   The patient was also able to take part in individual and group counseling sessions and work on appropriate coping skills.  The patient made steady improvement in her mood and expressed feeling more positive and hopeful about future. Sleep and appetite were improved.  The day of discharge the patient was calm, cooperative and pleasant. Mood was reported to be good, and denied SI/HI/AVH. Also reported no medication side effects.        Mental Status Exam upon discharge:   Mood \"good\"   Affect-congruent, appropriate, stable  Thought Content-goal directed, no delusional material present  Thought process-linear, organized.  Suicidality: No SI  Homicidality: No HI  Perception: No AH/    Procedures Performed         Consults:   Consults       No orders found from 8/8/2024 to 9/7/2024.            Pertinent Test Results:   Admission on 09/06/2024   Component Date Value Ref Range Status    Hepatitis B Surface Ag 09/06/2024 Non-Reactive  Non-Reactive Final    Hep A IgM 09/06/2024 Non-Reactive  Non-Reactive Final    Hep B C IgM 09/06/2024 Non-Reactive  Non-Reactive Final    Hepatitis C Ab 09/06/2024 Non-Reactive  Non-Reactive Final    Total Cholesterol " 09/07/2024 118  0 - 200 mg/dL Final    Triglycerides 09/07/2024 94  0 - 150 mg/dL Final    HDL Cholesterol 09/07/2024 40  40 - 60 mg/dL Final    LDL Cholesterol  09/07/2024 60  0 - 100 mg/dL Final    VLDL Cholesterol 09/07/2024 18  5 - 40 mg/dL Final    LDL/HDL Ratio 09/07/2024 1.48   Final    Hemoglobin A1C 09/07/2024 5.70 (H)  4.80 - 5.60 % Final   Admission on 09/05/2024, Discharged on 09/06/2024   Component Date Value Ref Range Status    QT Interval 09/05/2024 420  ms Final    QTC Interval 09/05/2024 481  ms Final    Glucose 09/05/2024 110 (H)  65 - 99 mg/dL Final    BUN 09/05/2024 15  8 - 23 mg/dL Final    Creatinine 09/05/2024 1.15 (H)  0.57 - 1.00 mg/dL Final    Sodium 09/05/2024 136  136 - 145 mmol/L Final    Potassium 09/05/2024 3.8  3.5 - 5.2 mmol/L Final    Chloride 09/05/2024 103  98 - 107 mmol/L Final    CO2 09/05/2024 20.8 (L)  22.0 - 29.0 mmol/L Final    Calcium 09/05/2024 8.8  8.6 - 10.5 mg/dL Final    Total Protein 09/05/2024 6.3  6.0 - 8.5 g/dL Final    Albumin 09/05/2024 3.9  3.5 - 5.2 g/dL Final    ALT (SGPT) 09/05/2024 14  1 - 33 U/L Final    AST (SGOT) 09/05/2024 20  1 - 32 U/L Final    Alkaline Phosphatase 09/05/2024 80  39 - 117 U/L Final    Total Bilirubin 09/05/2024 0.2  0.0 - 1.2 mg/dL Final    Globulin 09/05/2024 2.4  gm/dL Final    A/G Ratio 09/05/2024 1.6  g/dL Final    BUN/Creatinine Ratio 09/05/2024 13.0  7.0 - 25.0 Final    Anion Gap 09/05/2024 12.2  5.0 - 15.0 mmol/L Final    eGFR 09/05/2024 54.6 (L)  >60.0 mL/min/1.73 Final    Acetaminophen 09/05/2024 <5.0  0.0 - 30.0 mcg/mL Final    Ethanol 09/05/2024 <10  0 - 10 mg/dL Final    Ethanol % 09/05/2024 <0.010  % Final    Salicylate 09/05/2024 <0.3  <=30.0 mg/dL Final    THC, Screen, Urine 09/05/2024 Positive (A)  Negative Final    Phencyclidine (PCP), Urine 09/05/2024 Negative  Negative Final    Cocaine Screen, Urine 09/05/2024 Positive (A)  Negative Final    Methamphetamine, Ur 09/05/2024 Negative  Negative Final    Opiate Screen  09/05/2024 Negative  Negative Final    Amphetamine Screen, Urine 09/05/2024 Negative  Negative Final    Benzodiazepine Screen, Urine 09/05/2024 Positive (A)  Negative Final    Tricyclic Antidepressants Screen 09/05/2024 Positive (A)  Negative Final    Methadone Screen, Urine 09/05/2024 Negative  Negative Final    Barbiturates Screen, Urine 09/05/2024 Negative  Negative Final    Oxycodone Screen, Urine 09/05/2024 Negative  Negative Final    Buprenorphine, Screen, Urine 09/05/2024 Negative  Negative Final    WBC 09/05/2024 13.70 (H)  3.40 - 10.80 10*3/mm3 Final    RBC 09/05/2024 4.09  3.77 - 5.28 10*6/mm3 Final    Hemoglobin 09/05/2024 12.5  12.0 - 15.9 g/dL Final    Hematocrit 09/05/2024 37.9  34.0 - 46.6 % Final    MCV 09/05/2024 92.7  79.0 - 97.0 fL Final    MCH 09/05/2024 30.6  26.6 - 33.0 pg Final    MCHC 09/05/2024 33.0  31.5 - 35.7 g/dL Final    RDW 09/05/2024 13.4  12.3 - 15.4 % Final    RDW-SD 09/05/2024 45.8  37.0 - 54.0 fl Final    MPV 09/05/2024 9.8  6.0 - 12.0 fL Final    Platelets 09/05/2024 186  140 - 450 10*3/mm3 Final    Neutrophil % 09/05/2024 87.9 (H)  42.7 - 76.0 % Final    Lymphocyte % 09/05/2024 7.3 (L)  19.6 - 45.3 % Final    Monocyte % 09/05/2024 3.6 (L)  5.0 - 12.0 % Final    Eosinophil % 09/05/2024 0.1 (L)  0.3 - 6.2 % Final    Basophil % 09/05/2024 0.4  0.0 - 1.5 % Final    Immature Grans % 09/05/2024 0.7 (H)  0.0 - 0.5 % Final    Neutrophils, Absolute 09/05/2024 12.05 (H)  1.70 - 7.00 10*3/mm3 Final    Lymphocytes, Absolute 09/05/2024 1.00  0.70 - 3.10 10*3/mm3 Final    Monocytes, Absolute 09/05/2024 0.50  0.10 - 0.90 10*3/mm3 Final    Eosinophils, Absolute 09/05/2024 0.01  0.00 - 0.40 10*3/mm3 Final    Basophils, Absolute 09/05/2024 0.05  0.00 - 0.20 10*3/mm3 Final    Immature Grans, Absolute 09/05/2024 0.09 (H)  0.00 - 0.05 10*3/mm3 Final    nRBC 09/05/2024 0.0  0.0 - 0.2 /100 WBC Final    Glucose 09/05/2024 118  70 - 130 mg/dL Final    Fentanyl, Urine 09/05/2024 Negative  Negative Final     QT Interval 09/05/2024 396  ms Final    QTC Interval 09/05/2024 465  ms Final    Color, UA 09/06/2024 Yellow  Yellow, Straw Final    Appearance, UA 09/06/2024 Clear  Clear Final    pH, UA 09/06/2024 6.0  5.0 - 8.0 Final    Specific Gravity, UA 09/06/2024 1.008  1.005 - 1.030 Final    Glucose, UA 09/06/2024 Negative  Negative Final    Ketones, UA 09/06/2024 Trace (A)  Negative Final    Bilirubin, UA 09/06/2024 Negative  Negative Final    Blood, UA 09/06/2024 Trace (A)  Negative Final    Protein, UA 09/06/2024 Negative  Negative Final    Leuk Esterase, UA 09/06/2024 Negative  Negative Final    Nitrite, UA 09/06/2024 Negative  Negative Final    Urobilinogen, UA 09/06/2024 0.2 E.U./dL  0.2 - 1.0 E.U./dL Final    Magnesium 09/05/2024 2.0  1.6 - 2.4 mg/dL Final    RBC, UA 09/06/2024 3-5 (A)  None Seen, 0-2 /HPF Final    WBC, UA 09/06/2024 0-2  None Seen, 0-2 /HPF Final    Bacteria, UA 09/06/2024 None Seen  None Seen /HPF Final    Squamous Epithelial Cells, UA 09/06/2024 0-2  None Seen, 0-2 /HPF Final    Hyaline Casts, UA 09/06/2024 None Seen  None Seen /LPF Final    Methodology 09/06/2024 Automated Microscopy   Final   Admission on 08/19/2024, Discharged on 08/19/2024   Component Date Value Ref Range Status    Glucose 08/19/2024 108 (H)  65 - 99 mg/dL Final    BUN 08/19/2024 6 (L)  8 - 23 mg/dL Final    Creatinine 08/19/2024 0.82  0.57 - 1.00 mg/dL Final    Sodium 08/19/2024 136  136 - 145 mmol/L Final    Potassium 08/19/2024 4.0  3.5 - 5.2 mmol/L Final    Slight hemolysis detected by analyzer. Result may be falsely elevated.    Chloride 08/19/2024 102  98 - 107 mmol/L Final    CO2 08/19/2024 22.0  22.0 - 29.0 mmol/L Final    Calcium 08/19/2024 9.3  8.6 - 10.5 mg/dL Final    Total Protein 08/19/2024 7.1  6.0 - 8.5 g/dL Final    Albumin 08/19/2024 4.4  3.5 - 5.2 g/dL Final    ALT (SGPT) 08/19/2024 17  1 - 33 U/L Final    AST (SGOT) 08/19/2024 27  1 - 32 U/L Final    Alkaline Phosphatase 08/19/2024 96  39 - 117 U/L Final     Total Bilirubin 08/19/2024 0.2  0.0 - 1.2 mg/dL Final    Globulin 08/19/2024 2.7  gm/dL Final    A/G Ratio 08/19/2024 1.6  g/dL Final    BUN/Creatinine Ratio 08/19/2024 7.3  7.0 - 25.0 Final    Anion Gap 08/19/2024 12.0  5.0 - 15.0 mmol/L Final    eGFR 08/19/2024 82.0  >60.0 mL/min/1.73 Final    Lipase 08/19/2024 36  13 - 60 U/L Final    Color, UA 08/19/2024 Yellow  Yellow, Straw Final    Appearance, UA 08/19/2024 Clear  Clear Final    pH, UA 08/19/2024 6.0  5.0 - 8.0 Final    Specific Gravity, UA 08/19/2024 1.006  1.005 - 1.030 Final    Glucose, UA 08/19/2024 Negative  Negative Final    Ketones, UA 08/19/2024 15 mg/dL (1+) (A)  Negative Final    Bilirubin, UA 08/19/2024 Negative  Negative Final    Blood, UA 08/19/2024 Moderate (2+) (A)  Negative Final    Protein, UA 08/19/2024 Negative  Negative Final    Leuk Esterase, UA 08/19/2024 Negative  Negative Final    Nitrite, UA 08/19/2024 Negative  Negative Final    Urobilinogen, UA 08/19/2024 0.2 E.U./dL  0.2 - 1.0 E.U./dL Final    Magnesium 08/19/2024 2.0  1.6 - 2.4 mg/dL Final    WBC 08/19/2024 10.95 (H)  3.40 - 10.80 10*3/mm3 Final    RBC 08/19/2024 5.21  3.77 - 5.28 10*6/mm3 Final    Hemoglobin 08/19/2024 15.8  12.0 - 15.9 g/dL Final    Hematocrit 08/19/2024 46.7 (H)  34.0 - 46.6 % Final    MCV 08/19/2024 89.6  79.0 - 97.0 fL Final    MCH 08/19/2024 30.3  26.6 - 33.0 pg Final    MCHC 08/19/2024 33.8  31.5 - 35.7 g/dL Final    RDW 08/19/2024 13.5  12.3 - 15.4 % Final    RDW-SD 08/19/2024 44.7  37.0 - 54.0 fl Final    MPV 08/19/2024 9.8  6.0 - 12.0 fL Final    Platelets 08/19/2024 228  140 - 450 10*3/mm3 Final    Neutrophil % 08/19/2024 70.8  42.7 - 76.0 % Final    Lymphocyte % 08/19/2024 20.5  19.6 - 45.3 % Final    Monocyte % 08/19/2024 6.4  5.0 - 12.0 % Final    Eosinophil % 08/19/2024 1.4  0.3 - 6.2 % Final    Basophil % 08/19/2024 0.5  0.0 - 1.5 % Final    Immature Grans % 08/19/2024 0.4  0.0 - 0.5 % Final    Neutrophils, Absolute 08/19/2024 7.76 (H)  1.70 -  7.00 10*3/mm3 Final    Lymphocytes, Absolute 08/19/2024 2.25  0.70 - 3.10 10*3/mm3 Final    Monocytes, Absolute 08/19/2024 0.70  0.10 - 0.90 10*3/mm3 Final    Eosinophils, Absolute 08/19/2024 0.15  0.00 - 0.40 10*3/mm3 Final    Basophils, Absolute 08/19/2024 0.05  0.00 - 0.20 10*3/mm3 Final    Immature Grans, Absolute 08/19/2024 0.04  0.00 - 0.05 10*3/mm3 Final    nRBC 08/19/2024 0.0  0.0 - 0.2 /100 WBC Final    Extra Tube 08/19/2024 Hold for add-ons.   Final    Auto resulted.    Extra Tube 08/19/2024 hold for add-on   Final    Auto resulted    Extra Tube 08/19/2024 Hold for add-ons.   Final    Auto resulted.    Extra Tube 08/19/2024 Hold for add-ons.   Final    Auto resulted    RBC, UA 08/19/2024 6-10 (A)  None Seen, 0-2 /HPF Final    WBC, UA 08/19/2024 0-2  None Seen, 0-2 /HPF Final    Bacteria, UA 08/19/2024 None Seen  None Seen /HPF Final    Squamous Epithelial Cells, UA 08/19/2024 0-2  None Seen, 0-2 /HPF Final    Hyaline Casts, UA 08/19/2024 None Seen  None Seen /LPF Final    Methodology 08/19/2024 Manual Light Microscopy   Final    Ethanol 08/19/2024 <10  0 - 10 mg/dL Final    Ethanol % 08/19/2024 <0.010  % Final    THC, Screen, Urine 08/19/2024 Positive (A)  Negative Final    Phencyclidine (PCP), Urine 08/19/2024 Negative  Negative Final    Cocaine Screen, Urine 08/19/2024 Negative  Negative Final    Methamphetamine, Ur 08/19/2024 Negative  Negative Final    Opiate Screen 08/19/2024 Negative  Negative Final    Amphetamine Screen, Urine 08/19/2024 Negative  Negative Final    Benzodiazepine Screen, Urine 08/19/2024 Negative  Negative Final    Tricyclic Antidepressants Screen 08/19/2024 Negative  Negative Final    Methadone Screen, Urine 08/19/2024 Negative  Negative Final    Barbiturates Screen, Urine 08/19/2024 Negative  Negative Final    Oxycodone Screen, Urine 08/19/2024 Negative  Negative Final    Buprenorphine, Screen, Urine 08/19/2024 Negative  Negative Final    TSH 08/19/2024 2.670  0.270 - 4.200 uIU/mL  Final    Fentanyl, Urine 08/19/2024 Negative  Negative Final        Condition on Discharge:  improved    Vital Signs  Temp:  [97.6 °F (36.4 °C)-97.7 °F (36.5 °C)] 97.7 °F (36.5 °C)  Heart Rate:  [73-98] 98  Resp:  [16-18] 18  BP: (138-154)/() 144/83      Discharge Disposition:  Home or Self Care    Discharge Medications:     Discharge Medications        New Medications        Instructions Start Date   amLODIPine 5 MG tablet  Commonly known as: NORVASC   5 mg, Oral, Every 24 Hours Scheduled   Start Date: September 12, 2024            Continue These Medications        Instructions Start Date   atorvastatin 40 MG tablet  Commonly known as: LIPITOR   40 mg, Oral, Nightly      gabapentin 600 MG tablet  Commonly known as: NEURONTIN   600 mg, Oral, 3 Times Daily PRN      levocetirizine 5 MG tablet  Commonly known as: XYZAL   5 mg, Oral, Every Evening      levothyroxine 50 MCG tablet  Commonly known as: SYNTHROID, LEVOTHROID   50 mcg, Oral, Every Early Morning      metoprolol succinate XL 25 MG 24 hr tablet  Commonly known as: TOPROL-XL   25 mg, Oral, Daily      Rexulti 1 MG tablet  Generic drug: Brexpiprazole   1 mg, Oral, Daily      traZODone 50 MG tablet  Commonly known as: DESYREL   50 mg, Oral, Nightly      venlafaxine XR 75 MG 24 hr capsule  Commonly known as: EFFEXOR-XR   225 mg, Oral, Daily             Stop These Medications      QUEtiapine 25 MG tablet  Commonly known as: SEROquel              Discharge Diet: Regular     Activity at Discharge: As tolerated     Follow-up Appointments    September 12 at 8 am     Medication Management  [place] [address], [place] [address]  [phone]        Oct 15, 2024 at 10 am     Deloris Cano  [place] [address], [place] [address]  [phone]  Oct 15, 2024 @ 10 am         Time: I spent  > 30  minutes on this discharge activity which included: face-to-face encounter with the patient, reviewing the data in the system,  coordination of the care with the nursing staff as well as consultants, documentation, and entering orders.        Clinician:   Zia Cowan MD  09/11/24  12:29 EDT    Electronically signed by Zia Cowan MD at 09/11/24 0578

## 2025-01-31 NOTE — H&P
INITIAL PSYCHIATRIC HISTORY & PHYSICAL    Patient Identification:  Name:  Tarsha White  Age:  59 y.o.  Sex:  female  :  1965  MRN:  [id number]   Visit Number:  [id number]  Primary Care Physician:  Chapo Abel MD    SUBJECTIVE    CC/Focus of Exam: Suicide attempt    HPI: Tarsha White is a 59 y.o. female who was admitted on 2024 with complaints of suicide attempt with and overdose on Seroquel pills. Per report, the patient took about 50 Seroquel 25 mg tablets. She reports a long history of depression and it has gotten worse recently due to stress of having to take care of her brother and work (deliver pizza), and her sister recently found out that her breast cancer has come back and it is in her lungs and now has brain tumors and has been put in hospice care and patient is very upset because she is her baby sister.   Patient reports duration of her current depressive episode is about last couple of months, associated symptoms include poor sleep, poor appetite, feelings of guilt, difficulty with memory and concentration and suicidal thoughts. No manic/hypomanic episodes reported. No hallucinations reported.     PAST PSYCHIATRIC HX: Patient reports one previous inpatient admission to psych unit years ago in [place], [place]. She follows outpatient with her PMHNP at the [facility].     SUBSTANCE USE HX: Patient admits to history of marijuana use, and she is a recovering alcoholic and has been admitted to inpatient detox here in the past in , three times in a year. Her last use of alcohol was . She reports a friend gave her half of a Xanax pill yesterday morning. She denies any cocaine use. She is not sure if her marijuana was laced with cocaine.     SOCIAL HX:   Social History     Socioeconomic History    Marital status:     Number of children: 3    Years of education: 10TH GRADE    Highest education level: GED or equivalent   Tobacco Use    Smoking status: Every Day      2025    Patient: Roopa Couch  :  1946  Age:  78 y.o.  Sex:  female     PRE-OPERATIVE DIAGNOSIS: Bilateral   Sacroiliitis, somatic dysfunction of the lumbosacral spine.    POST-OPERATIVE DIAGNOSIS: Same.    PROCEDURE:  Fluoroscopic guided Bilateral   sacroiliac joint injection with steroid     SURGEON:  Leann Guerra MD    ANESTHESIA: Local    ESTIMATED BLOOD LOSS: None.  ______________________________________________________________________  BRIEF HISTORY:  Roopa Couch comes in today for   Bilateral sacroiliac joint injection under fluoroscopic guidance. The potential complications as well as the procedure in detail were explained to her today. She has elected to undergo the aforementioned procedure.    Roopa's complete History & Physical examination were reviewed in depth, a copy of which is in the chart.      DESCRIPTION OF PROCEDURE:    After confirming written and informed consent, a time-out was performed and Roopa’s name and date of birth, the procedure to be performed as well as the plan for the location of the needle insertion were confirmed.    The patient was brought into the procedure room and placed in the prone position on the fluoroscopy table. Standard monitors were placed and vital signs were observed throughout the procedure. The low back and upper buttocks area was prepped with chloraprep and draped in a sterile manner. AP fluoroscopy was used to visualize the RIGHT sacroiliac joint. The fluoroscopic beam was then obliqued until the anterior and posterior margins of the joint were aligned. The inferior margin of the joint was identified and marked. The skin and subcutaneous tissue about this identified point were anesthestized with 0.5% lidocaine. A 22 gauge 3 1/2 spinal needle was advanced toward the the identified point under fluoroscopic guidance. Once the targeted point was reached and the joint space was entered, negative aspiration was confirmed, and 0.5 cc ml of  Current packs/day: 1.00     Average packs/day: 1 pack/day for 44.0 years (44.0 ttl pk-yrs)     Types: Cigarettes    Smokeless tobacco: Never   Vaping Use    Vaping status: Never Used   Substance and Sexual Activity    Alcohol use: No     Comment: Past    Drug use: Yes     Types: Benzodiazepines, Cocaine(coke), Marijuana     Comment: pts denies but UDS POSITIVE    Sexual activity: Defer         Past Medical History:   Diagnosis Date    Anemia     Anxiety     Depression     Hyperlipidemia     Hypertension     Hypothyroidism     radiation to kill thyroid    Substance abuse     Suicide attempt     attempted OD on Seroquel 9/5/24    Tuberculosis           Past Surgical History:   Procedure Laterality Date    BREAST SURGERY      REDUCTION MAMMAPLASTY Bilateral 2000         TUBAL ABDOMINAL LIGATION         Family History   Problem Relation Age of Onset    COPD Mother     Thyroid disease Mother     Hypertension Mother     Emphysema Father     Hypertension Father     Heart disease Father     Cancer Father     COPD Sister     Hypertension Sister     Breast cancer Sister 50    Hypertension Brother     Heart disease Brother     Cancer Maternal Aunt     Cancer Paternal Uncle          Medications Prior to Admission   Medication Sig Dispense Refill Last Dose    atorvastatin (LIPITOR) 40 MG tablet Take 1 tablet by mouth Every Night.   9/5/2024    Brexpiprazole (Rexulti) 1 MG tablet Take 1 tablet by mouth Daily.   9/5/2024    gabapentin (NEURONTIN) 600 MG tablet Take 1 tablet by mouth 3 (Three) Times a Day As Needed (for nerve pain).   9/5/2024    levocetirizine (XYZAL) 5 MG tablet Take 1 tablet by mouth Every Evening.   9/5/2024    levothyroxine (SYNTHROID, LEVOTHROID) 50 MCG tablet Take 1 tablet by mouth Every Morning.   9/5/2024    metoprolol succinate XL (TOPROL-XL) 25 MG 24 hr tablet Take 1 tablet by mouth Daily.   9/5/2024    QUEtiapine (SEROquel) 25 MG tablet Take 1 tablet by mouth Every Night.   9/5/2024    traZODone  (DESYREL) 50 MG tablet Take 1 tablet by mouth Every Night.   9/5/2024    venlafaxine XR (EFFEXOR-XR) 75 MG 24 hr capsule Take 3 capsules by mouth Daily.   9/5/2024         ALLERGIES:  Patient has no known allergies.    Temp:  [95.4 °F (35.2 °C)-98.5 °F (36.9 °C)] 97.8 °F (36.6 °C)  Heart Rate:  [53-89] 78  Resp:  [12-18] 18  BP: ()/(47-87) 138/80    REVIEW OF SYSTEMS:  Review of Systems   Constitutional: Negative.    HENT: Negative.     Eyes: Negative.    Respiratory: Negative.     Cardiovascular: Negative.    Gastrointestinal: Negative.    Endocrine: Negative.    Genitourinary: Negative.    Musculoskeletal: Negative.    Skin: Negative.    Allergic/Immunologic: Negative.    Neurological: Negative.    Hematological: Negative.    Psychiatric/Behavioral:  Positive for dysphoric mood and suicidal ideas. The patient is nervous/anxious.         OBJECTIVE    PHYSICAL EXAM:  Physical Exam  Constitutional:  Appears well-developed and well-nourished.   HENT:   Head: Normocephalic and atraumatic.   Right Ear: External ear normal.   Left Ear: External ear normal.   Mouth/Throat: Oropharynx is clear and moist.   Eyes: Pupils are equal, round, and reactive to light. Conjunctivae and EOM are normal.   Neck: Normal range of motion. Neck supple.   Cardiovascular: Normal rate, regular rhythm and normal heart sounds.    Respiratory: Effort normal and breath sounds normal. No respiratory distress. No wheezes.   GI: Soft. Bowel sounds are normal.No distension. There is no tenderness.   Musculoskeletal: Normal range of motion. No edema or deformity.   Neurological:  Cranial Nerves: I. No anosmia. II: No visual disturbance. III, IV VI: EOMI, PERRLA. V: Corneal reflext intact, no abnormal sensations. VII: No facial palsy, or altered sensation. VIII: Hearing intact, balance intact. IX: Intact ah reflex. X: Normal phonation, swallowing. XI: Normal shrug and head movement. XII: Intact tongue movements  Coordination normal. No  lateralizing signs.  Skin: Skin is warm and dry. No rash noted. No erythema.     MENTAL STATUS EXAM:   Hygiene:   fair  Cooperation:  Cooperative  Eye Contact:  Good  Psychomotor Behavior:  Appropriate  Affect:  Restricted  Hopelessness: 5  Speech:  Normal  Thought Progress: Goal directed  Thought Content:  Normal  Suicidal:  Suicidal Ideation  Homicidal:  None  Hallucinations:  None  Delusion:  None  Memory:  Intact  Orientation:  Person, Place, Time, and Situation  Reliability:  fair  Insight:  Fair  Judgement:  Fair  Impulse Control:  Fair    Imaging Results (Last 24 Hours)       ** No results found for the last 24 hours. **             ECG/EMG Results (most recent)       None             Lab Results   Component Value Date    GLUCOSE 110 (H) 09/05/2024    BUN 15 09/05/2024    CREATININE 1.15 (H) 09/05/2024    EGFRIFNONA >60 10/15/2021    EGFRIFAFRI >60 10/15/2021    BCR 13.0 09/05/2024    CO2 20.8 (L) 09/05/2024    CALCIUM 8.8 09/05/2024    PROTENTOTREF 6.9 07/12/2016    ALBUMIN 3.9 09/05/2024    LABIL2 1.7 07/12/2016    AST 20 09/05/2024    ALT 14 09/05/2024       Lab Results   Component Value Date    WBC 13.70 (H) 09/05/2024    HGB 12.5 09/05/2024    HCT 37.9 09/05/2024    MCV 92.7 09/05/2024     09/05/2024       Last Urine Toxicity  More data exists         Latest Ref Rng & Units 9/5/2024 8/19/2024   LAST URINE TOXICITY RESULTS   Amphetamine, Urine Qual Negative Negative  Negative    Barbiturates Screen, Urine Negative Negative  Negative    Benzodiazepine Screen, Urine Negative Positive  Negative    Buprenorphine, Screen, Urine Negative Negative  Negative    Cocaine Screen, Urine Negative Positive  Negative    Fentanyl, Urine Negative Negative  Negative    Methadone Screen , Urine Negative Negative  Negative    Methamphetamine, Ur Negative Negative  Negative       Details                   Brief Urine Lab Results  (Last result in the past 365 days)        Color   Clarity   Blood   Leuk Est   Nitrite    Protein   CREAT   Urine HCG        09/06/24 0539 Yellow   Clear   Trace   Negative   Negative   Negative                   DATA  Labs reviewed. Creatinine 1.15, glucose 110. WBC 13.70. UA shows ketones, blood, 3-5 RBC. UDS positive for benzo, cocaine, thc, TCAs.   EKG reviewed. QTc interval 465 ms  ANGELI reviewed.   Record reviewed. The patient was recentlhy seen in the ED on 8/19/24 for depression and anxiety.       ASSESSMENT & PLAN:    Hospital bed: Yes patient is fall risk.      Suicidal deliberate poisoning, initial encounter  -SP 3      MDD (major depressive disorder), recurrent severe, without psychosis  -Continue venlafaxine  mg daily  -Continue Rexulti 1 mg daily      Hypertension  -Metoprolol XL 25 mg daily      Hypothyroidism following radioiodine therapy  -Levothyroxine 50 mcg daily      Tetrahydrocannabinol (THC) use disorder, severe, dependence  -Supportive treatment      Nicotine use disorder  -Encouraged cessation  -Nicotine replacement      The patient has been admitted for safety and stabilization.  Patient will be monitored for suicidality daily and maintained on Special Precautions Level 3 (q15 min checks) .  The patient will have individual and group therapy with a master's level therapist. A master treatment plan will be developed and agreed upon by the patient and his/her treatment team.  The patient's estimated length of stay in the hospital is 5-7 days.

## 2025-08-14 ENCOUNTER — APPOINTMENT (OUTPATIENT)
Dept: CT IMAGING | Facility: HOSPITAL | Age: 60
DRG: 917 | End: 2025-08-14
Payer: MEDICAID

## 2025-08-14 ENCOUNTER — APPOINTMENT (OUTPATIENT)
Dept: GENERAL RADIOLOGY | Facility: HOSPITAL | Age: 60
DRG: 917 | End: 2025-08-14
Payer: MEDICAID

## 2025-08-14 ENCOUNTER — HOSPITAL ENCOUNTER (INPATIENT)
Facility: HOSPITAL | Age: 60
LOS: 1 days | Discharge: ANOTHER HOSPITAL | DRG: 917 | End: 2025-08-15
Attending: STUDENT IN AN ORGANIZED HEALTH CARE EDUCATION/TRAINING PROGRAM | Admitting: INTERNAL MEDICINE
Payer: MEDICAID

## 2025-08-14 DIAGNOSIS — J96.01 ACUTE HYPOXEMIC RESPIRATORY FAILURE: ICD-10-CM

## 2025-08-14 DIAGNOSIS — T50.902A INTENTIONAL OVERDOSE, INITIAL ENCOUNTER: Primary | ICD-10-CM

## 2025-08-14 DIAGNOSIS — R56.9 SEIZURE: ICD-10-CM

## 2025-08-14 PROBLEM — T50.901A DRUG OVERDOSE: Status: ACTIVE | Noted: 2025-08-14

## 2025-08-14 LAB
A-A DO2: 25.1 MMHG (ref 0–300)
A-A DO2: 258.2 MMHG (ref 0–300)
A-A DO2: 37.9 MMHG (ref 0–300)
ALBUMIN SERPL-MCNC: 3.4 G/DL (ref 3.5–5.2)
ALBUMIN SERPL-MCNC: 3.8 G/DL (ref 3.5–5.2)
ALBUMIN/GLOB SERPL: 2.1 G/DL
ALBUMIN/GLOB SERPL: 2.1 G/DL
ALP SERPL-CCNC: 69 U/L (ref 39–117)
ALP SERPL-CCNC: 80 U/L (ref 39–117)
ALT SERPL W P-5'-P-CCNC: 10 U/L (ref 1–33)
ALT SERPL W P-5'-P-CCNC: 12 U/L (ref 1–33)
AMPHET+METHAMPHET UR QL: NEGATIVE
AMPHETAMINES UR QL: NEGATIVE
ANION GAP SERPL CALCULATED.3IONS-SCNC: 12.3 MMOL/L (ref 5–15)
ANION GAP SERPL CALCULATED.3IONS-SCNC: 12.5 MMOL/L (ref 5–15)
ANION GAP SERPL CALCULATED.3IONS-SCNC: 13.4 MMOL/L (ref 5–15)
APAP SERPL-MCNC: <5 MCG/ML (ref 0–30)
ARTERIAL PATENCY WRIST A: ABNORMAL
ARTERIAL PATENCY WRIST A: ABNORMAL
ARTERIAL PATENCY WRIST A: POSITIVE
AST SERPL-CCNC: 20 U/L (ref 1–32)
AST SERPL-CCNC: 20 U/L (ref 1–32)
ATMOSPHERIC PRESS: 726 MMHG
ATMOSPHERIC PRESS: 726 MMHG
ATMOSPHERIC PRESS: 727 MMHG
BACTERIA UR QL AUTO: NORMAL /HPF
BARBITURATES UR QL SCN: NEGATIVE
BASE EXCESS BLDA CALC-SCNC: -12.3 MMOL/L (ref 0–2)
BASE EXCESS BLDA CALC-SCNC: -6.5 MMOL/L (ref 0–2)
BASE EXCESS BLDA CALC-SCNC: -6.7 MMOL/L (ref 0–2)
BASOPHILS # BLD AUTO: 0.02 10*3/MM3 (ref 0–0.2)
BASOPHILS # BLD AUTO: 0.04 10*3/MM3 (ref 0–0.2)
BASOPHILS NFR BLD AUTO: 0.2 % (ref 0–1.5)
BASOPHILS NFR BLD AUTO: 0.5 % (ref 0–1.5)
BDY SITE: ABNORMAL
BENZODIAZ UR QL SCN: NEGATIVE
BILIRUB SERPL-MCNC: <0.2 MG/DL (ref 0–1.2)
BILIRUB SERPL-MCNC: <0.2 MG/DL (ref 0–1.2)
BILIRUB UR QL STRIP: NEGATIVE
BUN SERPL-MCNC: 10.8 MG/DL (ref 8–23)
BUN SERPL-MCNC: 8.9 MG/DL (ref 8–23)
BUN SERPL-MCNC: 9.2 MG/DL (ref 8–23)
BUN/CREAT SERPL: 11 (ref 7–25)
BUN/CREAT SERPL: 11 (ref 7–25)
BUN/CREAT SERPL: 11.4 (ref 7–25)
BUPRENORPHINE SERPL-MCNC: NEGATIVE NG/ML
CALCIUM SPEC-SCNC: 6.9 MG/DL (ref 8.6–10.5)
CALCIUM SPEC-SCNC: 7 MG/DL (ref 8.6–10.5)
CALCIUM SPEC-SCNC: 8.3 MG/DL (ref 8.6–10.5)
CANNABINOIDS SERPL QL: NEGATIVE
CHLORIDE SERPL-SCNC: 108 MMOL/L (ref 98–107)
CHLORIDE SERPL-SCNC: 111 MMOL/L (ref 98–107)
CHLORIDE SERPL-SCNC: 115 MMOL/L (ref 98–107)
CK SERPL-CCNC: 90 U/L (ref 20–180)
CLARITY UR: CLEAR
CO2 BLDA-SCNC: 17.6 MMOL/L (ref 22–33)
CO2 BLDA-SCNC: 18.8 MMOL/L (ref 22–33)
CO2 BLDA-SCNC: 20 MMOL/L (ref 22–33)
CO2 SERPL-SCNC: 17.5 MMOL/L (ref 22–29)
CO2 SERPL-SCNC: 17.6 MMOL/L (ref 22–29)
CO2 SERPL-SCNC: 17.7 MMOL/L (ref 22–29)
COCAINE UR QL: NEGATIVE
COHGB MFR BLD: 1.5 % (ref 0–5)
COHGB MFR BLD: 4 % (ref 0–5)
COHGB MFR BLD: 7.6 % (ref 0–5)
COLOR UR: YELLOW
CREAT SERPL-MCNC: 0.81 MG/DL (ref 0.57–1)
CREAT SERPL-MCNC: 0.81 MG/DL (ref 0.57–1)
CREAT SERPL-MCNC: 0.98 MG/DL (ref 0.57–1)
D-LACTATE SERPL-SCNC: 1 MMOL/L (ref 0.5–2)
D-LACTATE SERPL-SCNC: 2.5 MMOL/L (ref 0.5–2)
D-LACTATE SERPL-SCNC: 3.2 MMOL/L (ref 0.5–2)
DEPRECATED RDW RBC AUTO: 46.9 FL (ref 37–54)
DEPRECATED RDW RBC AUTO: 48.3 FL (ref 37–54)
EGFRCR SERPLBLD CKD-EPI 2021: 65.8 ML/MIN/1.73
EGFRCR SERPLBLD CKD-EPI 2021: 82.7 ML/MIN/1.73
EGFRCR SERPLBLD CKD-EPI 2021: 82.7 ML/MIN/1.73
EOSINOPHIL # BLD AUTO: 0 10*3/MM3 (ref 0–0.4)
EOSINOPHIL # BLD AUTO: 0.08 10*3/MM3 (ref 0–0.4)
EOSINOPHIL NFR BLD AUTO: 0 % (ref 0.3–6.2)
EOSINOPHIL NFR BLD AUTO: 1.1 % (ref 0.3–6.2)
ERYTHROCYTE [DISTWIDTH] IN BLOOD BY AUTOMATED COUNT: 13.5 % (ref 12.3–15.4)
ERYTHROCYTE [DISTWIDTH] IN BLOOD BY AUTOMATED COUNT: 13.5 % (ref 12.3–15.4)
ETHANOL BLD-MCNC: <10 MG/DL (ref 0–10)
ETHANOL UR QL: <0.01 %
FENTANYL UR-MCNC: NEGATIVE NG/ML
GAS FLOW AIRWAY: 2 LPM
GEN 5 1HR TROPONIN T REFLEX: <6 NG/L
GLOBULIN UR ELPH-MCNC: 1.6 GM/DL
GLOBULIN UR ELPH-MCNC: 1.8 GM/DL
GLUCOSE SERPL-MCNC: 139 MG/DL (ref 65–99)
GLUCOSE SERPL-MCNC: 158 MG/DL (ref 65–99)
GLUCOSE SERPL-MCNC: 183 MG/DL (ref 65–99)
GLUCOSE UR STRIP-MCNC: NEGATIVE MG/DL
HCO3 BLDA-SCNC: 16.2 MMOL/L (ref 20–26)
HCO3 BLDA-SCNC: 17.8 MMOL/L (ref 20–26)
HCO3 BLDA-SCNC: 18.8 MMOL/L (ref 20–26)
HCT VFR BLD AUTO: 36 % (ref 34–46.6)
HCT VFR BLD AUTO: 37.1 % (ref 34–46.6)
HCT VFR BLD CALC: 35.1 % (ref 38–51)
HCT VFR BLD CALC: 37.6 % (ref 38–51)
HCT VFR BLD CALC: 39.4 % (ref 38–51)
HGB BLD-MCNC: 11.7 G/DL (ref 12–15.9)
HGB BLD-MCNC: 12.4 G/DL (ref 12–15.9)
HGB BLDA-MCNC: 11.5 G/DL (ref 13.5–17.5)
HGB BLDA-MCNC: 12.3 G/DL (ref 13.5–17.5)
HGB BLDA-MCNC: 12.9 G/DL (ref 13.5–17.5)
HGB UR QL STRIP.AUTO: ABNORMAL
HOLD SPECIMEN: NORMAL
HOLD SPECIMEN: NORMAL
HYALINE CASTS UR QL AUTO: NORMAL /LPF
IMM GRANULOCYTES # BLD AUTO: 0.04 10*3/MM3 (ref 0–0.05)
IMM GRANULOCYTES # BLD AUTO: 0.04 10*3/MM3 (ref 0–0.05)
IMM GRANULOCYTES NFR BLD AUTO: 0.5 % (ref 0–0.5)
IMM GRANULOCYTES NFR BLD AUTO: 0.5 % (ref 0–0.5)
INHALED O2 CONCENTRATION: 21 %
INHALED O2 CONCENTRATION: 28 %
INHALED O2 CONCENTRATION: 60 %
INR PPP: 1.06 (ref 0.9–1.1)
KETONES UR QL STRIP: NEGATIVE
LEUKOCYTE ESTERASE UR QL STRIP.AUTO: NEGATIVE
LYMPHOCYTES # BLD AUTO: 0.55 10*3/MM3 (ref 0.7–3.1)
LYMPHOCYTES # BLD AUTO: 2.28 10*3/MM3 (ref 0.7–3.1)
LYMPHOCYTES NFR BLD AUTO: 30.1 % (ref 19.6–45.3)
LYMPHOCYTES NFR BLD AUTO: 6.5 % (ref 19.6–45.3)
Lab: ABNORMAL
MAGNESIUM SERPL-MCNC: 1.8 MG/DL (ref 1.6–2.4)
MCH RBC QN AUTO: 31.2 PG (ref 26.6–33)
MCH RBC QN AUTO: 31.6 PG (ref 26.6–33)
MCHC RBC AUTO-ENTMCNC: 32.5 G/DL (ref 31.5–35.7)
MCHC RBC AUTO-ENTMCNC: 33.4 G/DL (ref 31.5–35.7)
MCV RBC AUTO: 94.6 FL (ref 79–97)
MCV RBC AUTO: 96 FL (ref 79–97)
METHADONE UR QL SCN: NEGATIVE
METHGB BLD QL: 0.2 % (ref 0–3)
METHGB BLD QL: 0.7 % (ref 0–3)
METHGB BLD QL: 0.8 % (ref 0–3)
MODALITY: ABNORMAL
MONOCYTES # BLD AUTO: 0.14 10*3/MM3 (ref 0.1–0.9)
MONOCYTES # BLD AUTO: 0.41 10*3/MM3 (ref 0.1–0.9)
MONOCYTES NFR BLD AUTO: 1.7 % (ref 5–12)
MONOCYTES NFR BLD AUTO: 5.4 % (ref 5–12)
MRSA DNA SPEC QL NAA+PROBE: NORMAL
NEUTROPHILS NFR BLD AUTO: 4.73 10*3/MM3 (ref 1.7–7)
NEUTROPHILS NFR BLD AUTO: 62.4 % (ref 42.7–76)
NEUTROPHILS NFR BLD AUTO: 7.73 10*3/MM3 (ref 1.7–7)
NEUTROPHILS NFR BLD AUTO: 91.1 % (ref 42.7–76)
NITRITE UR QL STRIP: NEGATIVE
NOTIFIED BY: ABNORMAL
NOTIFIED WHO: ABNORMAL
NRBC BLD AUTO-RTO: 0 /100 WBC (ref 0–0.2)
NRBC BLD AUTO-RTO: 0 /100 WBC (ref 0–0.2)
NT-PROBNP SERPL-MCNC: 113 PG/ML (ref 0–900)
OPIATES UR QL: NEGATIVE
OXYCODONE UR QL SCN: NEGATIVE
OXYHGB MFR BLDV: 84.5 % (ref 94–99)
OXYHGB MFR BLDV: 92.3 % (ref 94–99)
OXYHGB MFR BLDV: 95.4 % (ref 94–99)
PCO2 BLDA: 30.9 MM HG (ref 35–45)
PCO2 BLDA: 37 MM HG (ref 35–45)
PCO2 BLDA: 45.9 MM HG (ref 35–45)
PCO2 TEMP ADJ BLD: ABNORMAL MM[HG]
PCP UR QL SCN: NEGATIVE
PEEP RESPIRATORY: 5 CM[H2O]
PH BLDA: 7.16 PH UNITS (ref 7.35–7.45)
PH BLDA: 7.32 PH UNITS (ref 7.35–7.45)
PH BLDA: 7.37 PH UNITS (ref 7.35–7.45)
PH UR STRIP.AUTO: 6.5 [PH] (ref 5–8)
PH, TEMP CORRECTED: ABNORMAL
PLATELET # BLD AUTO: 121 10*3/MM3 (ref 140–450)
PLATELET # BLD AUTO: 150 10*3/MM3 (ref 140–450)
PMV BLD AUTO: 10.5 FL (ref 6–12)
PMV BLD AUTO: 10.8 FL (ref 6–12)
PO2 BLDA: 114 MM HG (ref 83–108)
PO2 BLDA: 116 MM HG (ref 83–108)
PO2 BLDA: 63.1 MM HG (ref 83–108)
PO2 TEMP ADJ BLD: ABNORMAL MM[HG]
POTASSIUM SERPL-SCNC: 3.4 MMOL/L (ref 3.5–5.2)
POTASSIUM SERPL-SCNC: 3.6 MMOL/L (ref 3.5–5.2)
POTASSIUM SERPL-SCNC: 3.6 MMOL/L (ref 3.5–5.2)
PROCALCITONIN SERPL-MCNC: 0.02 NG/ML (ref 0–0.25)
PROT SERPL-MCNC: 5 G/DL (ref 6–8.5)
PROT SERPL-MCNC: 5.6 G/DL (ref 6–8.5)
PROT UR QL STRIP: NEGATIVE
PROTHROMBIN TIME: 14.5 SECONDS (ref 12.5–15.2)
RBC # BLD AUTO: 3.75 10*6/MM3 (ref 3.77–5.28)
RBC # BLD AUTO: 3.92 10*6/MM3 (ref 3.77–5.28)
RBC # UR STRIP: NORMAL /HPF
REF LAB TEST METHOD: NORMAL
SALICYLATES SERPL-MCNC: 13 MG/DL
SAO2 % BLDCOA: 92.1 % (ref 94–99)
SAO2 % BLDCOA: 97 % (ref 94–99)
SAO2 % BLDCOA: 97 % (ref 94–99)
SET MECH RESP RATE: 30
SODIUM SERPL-SCNC: 139 MMOL/L (ref 136–145)
SODIUM SERPL-SCNC: 141 MMOL/L (ref 136–145)
SODIUM SERPL-SCNC: 145 MMOL/L (ref 136–145)
SP GR UR STRIP: <=1.005 (ref 1–1.03)
SQUAMOUS #/AREA URNS HPF: NORMAL /HPF
T4 FREE SERPL-MCNC: 0.62 NG/DL (ref 0.92–1.68)
TRICYCLICS UR QL SCN: POSITIVE
TROPONIN T NUMERIC DELTA: NORMAL
TROPONIN T SERPL HS-MCNC: <6 NG/L
TSH SERPL DL<=0.05 MIU/L-ACNC: 9.89 UIU/ML (ref 0.27–4.2)
UROBILINOGEN UR QL STRIP: ABNORMAL
VENTILATOR MODE: ABNORMAL
VT ON VENT VENT: 400 ML
WBC # UR STRIP: NORMAL /HPF
WBC NRBC COR # BLD AUTO: 7.58 10*3/MM3 (ref 3.4–10.8)
WBC NRBC COR # BLD AUTO: 8.48 10*3/MM3 (ref 3.4–10.8)
WHOLE BLOOD HOLD COAG: NORMAL
WHOLE BLOOD HOLD SPECIMEN: NORMAL

## 2025-08-14 PROCEDURE — 94761 N-INVAS EAR/PLS OXIMETRY MLT: CPT

## 2025-08-14 PROCEDURE — 25010000002 LACOSAMIDE 200 MG/20ML SOLUTION: Performed by: STUDENT IN AN ORGANIZED HEALTH CARE EDUCATION/TRAINING PROGRAM

## 2025-08-14 PROCEDURE — 25510000001 IOPAMIDOL PER 1 ML: Performed by: STUDENT IN AN ORGANIZED HEALTH CARE EDUCATION/TRAINING PROGRAM

## 2025-08-14 PROCEDURE — 25010000002 MIDAZOLAM 1 MG/ML 100ML NS 100 MG/100ML SOLUTION: Performed by: STUDENT IN AN ORGANIZED HEALTH CARE EDUCATION/TRAINING PROGRAM

## 2025-08-14 PROCEDURE — 83050 HGB METHEMOGLOBIN QUAN: CPT

## 2025-08-14 PROCEDURE — 5A1945Z RESPIRATORY VENTILATION, 24-96 CONSECUTIVE HOURS: ICD-10-PCS | Performed by: INTERNAL MEDICINE

## 2025-08-14 PROCEDURE — 71275 CT ANGIOGRAPHY CHEST: CPT | Performed by: RADIOLOGY

## 2025-08-14 PROCEDURE — 82375 ASSAY CARBOXYHB QUANT: CPT

## 2025-08-14 PROCEDURE — 36415 COLL VENOUS BLD VENIPUNCTURE: CPT | Performed by: STUDENT IN AN ORGANIZED HEALTH CARE EDUCATION/TRAINING PROGRAM

## 2025-08-14 PROCEDURE — 84145 PROCALCITONIN (PCT): CPT | Performed by: INTERNAL MEDICINE

## 2025-08-14 PROCEDURE — 83605 ASSAY OF LACTIC ACID: CPT | Performed by: STUDENT IN AN ORGANIZED HEALTH CARE EDUCATION/TRAINING PROGRAM

## 2025-08-14 PROCEDURE — 25010000002 CALCIUM GLUCONATE-NACL 1-0.675 GM/50ML-% SOLUTION: Performed by: INTERNAL MEDICINE

## 2025-08-14 PROCEDURE — 71045 X-RAY EXAM CHEST 1 VIEW: CPT | Performed by: RADIOLOGY

## 2025-08-14 PROCEDURE — 87641 MR-STAPH DNA AMP PROBE: CPT | Performed by: INTERNAL MEDICINE

## 2025-08-14 PROCEDURE — 85610 PROTHROMBIN TIME: CPT | Performed by: STUDENT IN AN ORGANIZED HEALTH CARE EDUCATION/TRAINING PROGRAM

## 2025-08-14 PROCEDURE — 25010000002 VANCOMYCIN 5 G RECONSTITUTED SOLUTION: Performed by: STUDENT IN AN ORGANIZED HEALTH CARE EDUCATION/TRAINING PROGRAM

## 2025-08-14 PROCEDURE — 36600 WITHDRAWAL OF ARTERIAL BLOOD: CPT

## 2025-08-14 PROCEDURE — 25010000002 CALCIUM GLUCONATE 2-0.675 GM/100ML-% SOLUTION: Performed by: INTERNAL MEDICINE

## 2025-08-14 PROCEDURE — 94640 AIRWAY INHALATION TREATMENT: CPT

## 2025-08-14 PROCEDURE — 71275 CT ANGIOGRAPHY CHEST: CPT

## 2025-08-14 PROCEDURE — 83880 ASSAY OF NATRIURETIC PEPTIDE: CPT | Performed by: INTERNAL MEDICINE

## 2025-08-14 PROCEDURE — 31500 INSERT EMERGENCY AIRWAY: CPT

## 2025-08-14 PROCEDURE — 80307 DRUG TEST PRSMV CHEM ANLYZR: CPT | Performed by: STUDENT IN AN ORGANIZED HEALTH CARE EDUCATION/TRAINING PROGRAM

## 2025-08-14 PROCEDURE — 80053 COMPREHEN METABOLIC PANEL: CPT | Performed by: STUDENT IN AN ORGANIZED HEALTH CARE EDUCATION/TRAINING PROGRAM

## 2025-08-14 PROCEDURE — 82550 ASSAY OF CK (CPK): CPT | Performed by: STUDENT IN AN ORGANIZED HEALTH CARE EDUCATION/TRAINING PROGRAM

## 2025-08-14 PROCEDURE — 84439 ASSAY OF FREE THYROXINE: CPT | Performed by: STUDENT IN AN ORGANIZED HEALTH CARE EDUCATION/TRAINING PROGRAM

## 2025-08-14 PROCEDURE — 25010000002 SUCCINYLCHOLINE PER 20 MG: Performed by: STUDENT IN AN ORGANIZED HEALTH CARE EDUCATION/TRAINING PROGRAM

## 2025-08-14 PROCEDURE — 85025 COMPLETE CBC W/AUTO DIFF WBC: CPT | Performed by: STUDENT IN AN ORGANIZED HEALTH CARE EDUCATION/TRAINING PROGRAM

## 2025-08-14 PROCEDURE — 93005 ELECTROCARDIOGRAM TRACING: CPT | Performed by: STUDENT IN AN ORGANIZED HEALTH CARE EDUCATION/TRAINING PROGRAM

## 2025-08-14 PROCEDURE — 99223 1ST HOSP IP/OBS HIGH 75: CPT | Performed by: INTERNAL MEDICINE

## 2025-08-14 PROCEDURE — 71045 X-RAY EXAM CHEST 1 VIEW: CPT

## 2025-08-14 PROCEDURE — 83735 ASSAY OF MAGNESIUM: CPT | Performed by: STUDENT IN AN ORGANIZED HEALTH CARE EDUCATION/TRAINING PROGRAM

## 2025-08-14 PROCEDURE — 25010000002 METHYLPREDNISOLONE PER 125 MG: Performed by: STUDENT IN AN ORGANIZED HEALTH CARE EDUCATION/TRAINING PROGRAM

## 2025-08-14 PROCEDURE — 80179 DRUG ASSAY SALICYLATE: CPT | Performed by: STUDENT IN AN ORGANIZED HEALTH CARE EDUCATION/TRAINING PROGRAM

## 2025-08-14 PROCEDURE — 25010000002 CEFTRIAXONE PER 250 MG: Performed by: STUDENT IN AN ORGANIZED HEALTH CARE EDUCATION/TRAINING PROGRAM

## 2025-08-14 PROCEDURE — 25010000002 LORAZEPAM PER 2 MG: Performed by: INTERNAL MEDICINE

## 2025-08-14 PROCEDURE — 94799 UNLISTED PULMONARY SVC/PX: CPT

## 2025-08-14 PROCEDURE — C9254 INJECTION, LACOSAMIDE: HCPCS | Performed by: STUDENT IN AN ORGANIZED HEALTH CARE EDUCATION/TRAINING PROGRAM

## 2025-08-14 PROCEDURE — 70450 CT HEAD/BRAIN W/O DYE: CPT | Performed by: RADIOLOGY

## 2025-08-14 PROCEDURE — 87040 BLOOD CULTURE FOR BACTERIA: CPT | Performed by: STUDENT IN AN ORGANIZED HEALTH CARE EDUCATION/TRAINING PROGRAM

## 2025-08-14 PROCEDURE — 93005 ELECTROCARDIOGRAM TRACING: CPT | Performed by: INTERNAL MEDICINE

## 2025-08-14 PROCEDURE — 0BH17EZ INSERTION OF ENDOTRACHEAL AIRWAY INTO TRACHEA, VIA NATURAL OR ARTIFICIAL OPENING: ICD-10-PCS | Performed by: INTERNAL MEDICINE

## 2025-08-14 PROCEDURE — 99285 EMERGENCY DEPT VISIT HI MDM: CPT

## 2025-08-14 PROCEDURE — 25810000003 SODIUM CHLORIDE 0.9 % SOLUTION: Performed by: STUDENT IN AN ORGANIZED HEALTH CARE EDUCATION/TRAINING PROGRAM

## 2025-08-14 PROCEDURE — 70450 CT HEAD/BRAIN W/O DYE: CPT

## 2025-08-14 PROCEDURE — 80143 DRUG ASSAY ACETAMINOPHEN: CPT | Performed by: STUDENT IN AN ORGANIZED HEALTH CARE EDUCATION/TRAINING PROGRAM

## 2025-08-14 PROCEDURE — 25010000002 AZTREONAM PER 500 MG: Performed by: INTERNAL MEDICINE

## 2025-08-14 PROCEDURE — 84484 ASSAY OF TROPONIN QUANT: CPT | Performed by: INTERNAL MEDICINE

## 2025-08-14 PROCEDURE — 82805 BLOOD GASES W/O2 SATURATION: CPT

## 2025-08-14 PROCEDURE — 84443 ASSAY THYROID STIM HORMONE: CPT | Performed by: STUDENT IN AN ORGANIZED HEALTH CARE EDUCATION/TRAINING PROGRAM

## 2025-08-14 PROCEDURE — 74177 CT ABD & PELVIS W/CONTRAST: CPT | Performed by: RADIOLOGY

## 2025-08-14 PROCEDURE — C1751 CATH, INF, PER/CENT/MIDLINE: HCPCS

## 2025-08-14 PROCEDURE — 82077 ASSAY SPEC XCP UR&BREATH IA: CPT | Performed by: STUDENT IN AN ORGANIZED HEALTH CARE EDUCATION/TRAINING PROGRAM

## 2025-08-14 PROCEDURE — 25010000002 MIDAZOLAM PER 1 MG: Performed by: INTERNAL MEDICINE

## 2025-08-14 PROCEDURE — 94002 VENT MGMT INPAT INIT DAY: CPT

## 2025-08-14 PROCEDURE — 81001 URINALYSIS AUTO W/SCOPE: CPT | Performed by: STUDENT IN AN ORGANIZED HEALTH CARE EDUCATION/TRAINING PROGRAM

## 2025-08-14 PROCEDURE — 51702 INSERT TEMP BLADDER CATH: CPT

## 2025-08-14 PROCEDURE — 74177 CT ABD & PELVIS W/CONTRAST: CPT

## 2025-08-14 RX ORDER — SODIUM CHLORIDE 0.9 % (FLUSH) 0.9 %
10 SYRINGE (ML) INJECTION AS NEEDED
Status: DISCONTINUED | OUTPATIENT
Start: 2025-08-14 | End: 2025-08-15 | Stop reason: HOSPADM

## 2025-08-14 RX ORDER — SUCCINYLCHOLINE CHLORIDE 20 MG/ML
1.5 INJECTION INTRAMUSCULAR; INTRAVENOUS ONCE
Status: COMPLETED | OUTPATIENT
Start: 2025-08-14 | End: 2025-08-14

## 2025-08-14 RX ORDER — SODIUM CHLORIDE 0.9 % (FLUSH) 0.9 %
10 SYRINGE (ML) INJECTION EVERY 12 HOURS SCHEDULED
Status: DISCONTINUED | OUTPATIENT
Start: 2025-08-14 | End: 2025-08-15 | Stop reason: HOSPADM

## 2025-08-14 RX ORDER — POTASSIUM CHLORIDE 1500 MG/1
40 TABLET, EXTENDED RELEASE ORAL ONCE
Status: DISCONTINUED | OUTPATIENT
Start: 2025-08-14 | End: 2025-08-15

## 2025-08-14 RX ORDER — AMOXICILLIN 250 MG
2 CAPSULE ORAL 2 TIMES DAILY
Status: CANCELLED | OUTPATIENT
Start: 2025-08-14

## 2025-08-14 RX ORDER — SODIUM CHLORIDE 9 MG/ML
40 INJECTION, SOLUTION INTRAVENOUS AS NEEDED
Status: DISCONTINUED | OUTPATIENT
Start: 2025-08-14 | End: 2025-08-15 | Stop reason: HOSPADM

## 2025-08-14 RX ORDER — ENOXAPARIN SODIUM 100 MG/ML
40 INJECTION SUBCUTANEOUS DAILY
Status: DISCONTINUED | OUTPATIENT
Start: 2025-08-15 | End: 2025-08-15 | Stop reason: HOSPADM

## 2025-08-14 RX ORDER — IPRATROPIUM BROMIDE AND ALBUTEROL SULFATE 2.5; .5 MG/3ML; MG/3ML
3 SOLUTION RESPIRATORY (INHALATION)
Status: DISCONTINUED | OUTPATIENT
Start: 2025-08-14 | End: 2025-08-15 | Stop reason: HOSPADM

## 2025-08-14 RX ORDER — BISACODYL 10 MG
10 SUPPOSITORY, RECTAL RECTAL DAILY PRN
Status: DISCONTINUED | OUTPATIENT
Start: 2025-08-14 | End: 2025-08-15 | Stop reason: HOSPADM

## 2025-08-14 RX ORDER — LEVETIRACETAM 500 MG/5ML
500 INJECTION, SOLUTION, CONCENTRATE INTRAVENOUS EVERY 12 HOURS SCHEDULED
Status: DISCONTINUED | OUTPATIENT
Start: 2025-08-15 | End: 2025-08-14

## 2025-08-14 RX ORDER — LORAZEPAM 2 MG/ML
2 INJECTION INTRAMUSCULAR EVERY 4 HOURS PRN
Status: DISCONTINUED | OUTPATIENT
Start: 2025-08-14 | End: 2025-08-15 | Stop reason: HOSPADM

## 2025-08-14 RX ORDER — BISACODYL 5 MG/1
5 TABLET, DELAYED RELEASE ORAL DAILY PRN
Status: DISCONTINUED | OUTPATIENT
Start: 2025-08-14 | End: 2025-08-15 | Stop reason: HOSPADM

## 2025-08-14 RX ORDER — NITROGLYCERIN 0.4 MG/1
0.4 TABLET SUBLINGUAL
Status: DISCONTINUED | OUTPATIENT
Start: 2025-08-14 | End: 2025-08-15 | Stop reason: HOSPADM

## 2025-08-14 RX ORDER — LACOSAMIDE 10 MG/ML
100 INJECTION, SOLUTION INTRAVENOUS EVERY 12 HOURS
Status: DISCONTINUED | OUTPATIENT
Start: 2025-08-15 | End: 2025-08-15 | Stop reason: HOSPADM

## 2025-08-14 RX ORDER — MIDAZOLAM HYDROCHLORIDE 1 MG/ML
5 INJECTION, SOLUTION INTRAMUSCULAR; INTRAVENOUS ONCE
Status: COMPLETED | OUTPATIENT
Start: 2025-08-14 | End: 2025-08-14

## 2025-08-14 RX ORDER — AMOXICILLIN 250 MG
2 CAPSULE ORAL 2 TIMES DAILY
Status: DISCONTINUED | OUTPATIENT
Start: 2025-08-14 | End: 2025-08-15 | Stop reason: HOSPADM

## 2025-08-14 RX ORDER — LORAZEPAM 2 MG/ML
INJECTION INTRAMUSCULAR
Status: COMPLETED
Start: 2025-08-14 | End: 2025-08-15

## 2025-08-14 RX ORDER — PANTOPRAZOLE SODIUM 40 MG/10ML
40 INJECTION, POWDER, LYOPHILIZED, FOR SOLUTION INTRAVENOUS
Status: DISCONTINUED | OUTPATIENT
Start: 2025-08-15 | End: 2025-08-15 | Stop reason: HOSPADM

## 2025-08-14 RX ORDER — NOREPINEPHRINE BITARTRATE 0.03 MG/ML
.02-.3 INJECTION, SOLUTION INTRAVENOUS
Status: DISCONTINUED | OUTPATIENT
Start: 2025-08-14 | End: 2025-08-15 | Stop reason: HOSPADM

## 2025-08-14 RX ORDER — IPRATROPIUM BROMIDE AND ALBUTEROL SULFATE 2.5; .5 MG/3ML; MG/3ML
3 SOLUTION RESPIRATORY (INHALATION) EVERY 6 HOURS PRN
Status: DISCONTINUED | OUTPATIENT
Start: 2025-08-14 | End: 2025-08-15 | Stop reason: HOSPADM

## 2025-08-14 RX ORDER — LEVETIRACETAM 500 MG/5ML
1000 INJECTION, SOLUTION, CONCENTRATE INTRAVENOUS ONCE
Status: DISCONTINUED | OUTPATIENT
Start: 2025-08-14 | End: 2025-08-14

## 2025-08-14 RX ORDER — POLYETHYLENE GLYCOL 3350 17 G/17G
17 POWDER, FOR SOLUTION ORAL DAILY PRN
Status: DISCONTINUED | OUTPATIENT
Start: 2025-08-14 | End: 2025-08-15 | Stop reason: HOSPADM

## 2025-08-14 RX ORDER — LACOSAMIDE 10 MG/ML
50 INJECTION, SOLUTION INTRAVENOUS EVERY 12 HOURS
Status: DISCONTINUED | OUTPATIENT
Start: 2025-08-14 | End: 2025-08-14

## 2025-08-14 RX ORDER — BISACODYL 10 MG
10 SUPPOSITORY, RECTAL RECTAL DAILY PRN
Status: CANCELLED | OUTPATIENT
Start: 2025-08-14

## 2025-08-14 RX ORDER — SODIUM CHLORIDE 0.9 % (FLUSH) 0.9 %
20 SYRINGE (ML) INJECTION AS NEEDED
Status: DISCONTINUED | OUTPATIENT
Start: 2025-08-14 | End: 2025-08-15 | Stop reason: HOSPADM

## 2025-08-14 RX ORDER — MIDAZOLAM IN NACL,ISO-OSMOT/PF 50 MG/50ML
1-10 INFUSION BOTTLE (ML) INTRAVENOUS
Status: DISCONTINUED | OUTPATIENT
Start: 2025-08-14 | End: 2025-08-15

## 2025-08-14 RX ORDER — IOPAMIDOL 755 MG/ML
100 INJECTION, SOLUTION INTRAVASCULAR
Status: COMPLETED | OUTPATIENT
Start: 2025-08-14 | End: 2025-08-14

## 2025-08-14 RX ORDER — ACETAMINOPHEN 650 MG/1
650 SUPPOSITORY RECTAL EVERY 4 HOURS PRN
Status: DISCONTINUED | OUTPATIENT
Start: 2025-08-14 | End: 2025-08-15 | Stop reason: HOSPADM

## 2025-08-14 RX ORDER — ETOMIDATE 2 MG/ML
0.3 INJECTION INTRAVENOUS ONCE
Status: COMPLETED | OUTPATIENT
Start: 2025-08-14 | End: 2025-08-14

## 2025-08-14 RX ORDER — MIDAZOLAM HYDROCHLORIDE 1 MG/ML
5 INJECTION, SOLUTION INTRAMUSCULAR; INTRAVENOUS ONCE
Status: COMPLETED | OUTPATIENT
Start: 2025-08-15 | End: 2025-08-14

## 2025-08-14 RX ORDER — FENTANYL CITRATE-0.9 % NACL/PF 10 MCG/ML
50-300 PLASTIC BAG, INJECTION (ML) INTRAVENOUS
Status: DISCONTINUED | OUTPATIENT
Start: 2025-08-14 | End: 2025-08-15 | Stop reason: HOSPADM

## 2025-08-14 RX ORDER — LORAZEPAM 2 MG/ML
2 INJECTION INTRAMUSCULAR
Status: DISCONTINUED | OUTPATIENT
Start: 2025-08-14 | End: 2025-08-14

## 2025-08-14 RX ORDER — LACOSAMIDE 10 MG/ML
400 INJECTION, SOLUTION INTRAVENOUS ONCE
Status: COMPLETED | OUTPATIENT
Start: 2025-08-14 | End: 2025-08-14

## 2025-08-14 RX ORDER — CALCIUM GLUCONATE 20 MG/ML
1000 INJECTION, SOLUTION INTRAVENOUS
Status: DISCONTINUED | OUTPATIENT
Start: 2025-08-14 | End: 2025-08-14

## 2025-08-14 RX ORDER — FAMOTIDINE 10 MG/ML
20 INJECTION, SOLUTION INTRAVENOUS ONCE
Status: DISCONTINUED | OUTPATIENT
Start: 2025-08-14 | End: 2025-08-14

## 2025-08-14 RX ORDER — BISACODYL 5 MG/1
5 TABLET, DELAYED RELEASE ORAL DAILY PRN
Status: CANCELLED | OUTPATIENT
Start: 2025-08-14

## 2025-08-14 RX ORDER — POLYETHYLENE GLYCOL 3350 17 G/17G
17 POWDER, FOR SOLUTION ORAL DAILY PRN
Status: CANCELLED | OUTPATIENT
Start: 2025-08-14

## 2025-08-14 RX ORDER — CALCIUM GLUCONATE 20 MG/ML
2000 INJECTION, SOLUTION INTRAVENOUS
Status: COMPLETED | OUTPATIENT
Start: 2025-08-14 | End: 2025-08-15

## 2025-08-14 RX ORDER — CALCIUM GLUCONATE 20 MG/ML
1000 INJECTION, SOLUTION INTRAVENOUS
Status: COMPLETED | OUTPATIENT
Start: 2025-08-14 | End: 2025-08-14

## 2025-08-14 RX ORDER — CALCIUM GLUCONATE 20 MG/ML
2000 INJECTION, SOLUTION INTRAVENOUS
Status: DISCONTINUED | OUTPATIENT
Start: 2025-08-14 | End: 2025-08-14

## 2025-08-14 RX ORDER — MIDAZOLAM HYDROCHLORIDE 1 MG/ML
5 INJECTION, SOLUTION INTRAMUSCULAR; INTRAVENOUS
Status: COMPLETED | OUTPATIENT
Start: 2025-08-14 | End: 2025-08-15

## 2025-08-14 RX ORDER — ACETAMINOPHEN 325 MG/1
650 TABLET ORAL EVERY 4 HOURS PRN
Status: DISCONTINUED | OUTPATIENT
Start: 2025-08-14 | End: 2025-08-15 | Stop reason: HOSPADM

## 2025-08-14 RX ADMIN — Medication 10 ML: at 20:16

## 2025-08-14 RX ADMIN — MUPIROCIN 1 APPLICATION: 20 OINTMENT TOPICAL at 21:27

## 2025-08-14 RX ADMIN — CALCIUM GLUCONATE 2000 MG: 20 INJECTION, SOLUTION INTRAVENOUS at 22:33

## 2025-08-14 RX ADMIN — MIDAZOLAM IN SODIUM CHLORIDE 1 MG/HR: 1 INJECTION, SOLUTION INTRAVENOUS at 18:25

## 2025-08-14 RX ADMIN — NOREPINEPHRINE BITARTRATE 0.03 MCG/KG/MIN: 32 SOLUTION INTRAVENOUS at 18:03

## 2025-08-14 RX ADMIN — Medication 10 ML: at 20:15

## 2025-08-14 RX ADMIN — IPRATROPIUM BROMIDE AND ALBUTEROL SULFATE 3 ML: .5; 3 SOLUTION RESPIRATORY (INHALATION) at 20:24

## 2025-08-14 RX ADMIN — MIDAZOLAM HYDROCHLORIDE 5 MG: 1 INJECTION, SOLUTION INTRAMUSCULAR; INTRAVENOUS at 23:29

## 2025-08-14 RX ADMIN — WATER 125 MG: 1 INJECTION INTRAMUSCULAR; INTRAVENOUS; SUBCUTANEOUS at 13:30

## 2025-08-14 RX ADMIN — LACOSAMIDE 400 MG: 10 INJECTION INTRAVENOUS at 17:58

## 2025-08-14 RX ADMIN — SODIUM BICARBONATE INJECTION, 150 MEQ: 84 SOLUTION INTRAVENOUS at 19:00

## 2025-08-14 RX ADMIN — SODIUM CHLORIDE 2000 ML: 9 INJECTION, SOLUTION INTRAVENOUS at 12:10

## 2025-08-14 RX ADMIN — MIDAZOLAM HYDROCHLORIDE 5 MG: 1 INJECTION, SOLUTION INTRAMUSCULAR; INTRAVENOUS at 23:06

## 2025-08-14 RX ADMIN — LORAZEPAM 2 MG: 2 INJECTION, SOLUTION INTRAMUSCULAR; INTRAVENOUS at 23:13

## 2025-08-14 RX ADMIN — ETOMIDATE 19 MG: 2 INJECTION, SOLUTION INTRAVENOUS at 18:21

## 2025-08-14 RX ADMIN — IOPAMIDOL 80 ML: 755 INJECTION, SOLUTION INTRAVENOUS at 19:28

## 2025-08-14 RX ADMIN — SODIUM CHLORIDE 2000 MG: 9 INJECTION, SOLUTION INTRAVENOUS at 12:42

## 2025-08-14 RX ADMIN — NOREPINEPHRINE BITARTRATE 0.02 MCG/KG/MIN: 32 SOLUTION INTRAVENOUS at 21:09

## 2025-08-14 RX ADMIN — CALCIUM GLUCONATE 1000 MG: 20 INJECTION, SOLUTION INTRAVENOUS at 21:27

## 2025-08-14 RX ADMIN — VALPROATE SODIUM 2504 MG: 100 INJECTION, SOLUTION INTRAVENOUS at 23:00

## 2025-08-14 RX ADMIN — VANCOMYCIN HYDROCHLORIDE 1250 MG: 500 INJECTION, POWDER, LYOPHILIZED, FOR SOLUTION INTRAVENOUS at 18:59

## 2025-08-14 RX ADMIN — AZTREONAM 2000 MG: 2 INJECTION, POWDER, LYOPHILIZED, FOR SOLUTION INTRAMUSCULAR; INTRAVENOUS at 19:04

## 2025-08-14 RX ADMIN — Medication 50 MCG/HR: at 18:25

## 2025-08-14 RX ADMIN — SENNOSIDES, DOCUSATE SODIUM 2 TABLET: 50; 8.6 TABLET, FILM COATED ORAL at 20:16

## 2025-08-14 RX ADMIN — SUCCINYLCHOLINE CHLORIDE 94 MG: 20 INJECTION, SOLUTION INTRAMUSCULAR; INTRAVENOUS at 18:22

## 2025-08-14 RX ADMIN — SODIUM CHLORIDE 1000 ML: 9 INJECTION, SOLUTION INTRAVENOUS at 18:50

## 2025-08-15 ENCOUNTER — APPOINTMENT (OUTPATIENT)
Dept: RESPIRATORY THERAPY | Facility: HOSPITAL | Age: 60
DRG: 917 | End: 2025-08-15
Payer: MEDICAID

## 2025-08-15 ENCOUNTER — HOSPITAL ENCOUNTER (INPATIENT)
Facility: HOSPITAL | Age: 60
LOS: 10 days | Discharge: PSYCHIATRIC OR SUBSTANCE USE FACILITY | DRG: 917 | End: 2025-08-25
Attending: INTERNAL MEDICINE | Admitting: HOSPITALIST
Payer: MEDICAID

## 2025-08-15 VITALS
OXYGEN SATURATION: 94 % | WEIGHT: 151.24 LBS | DIASTOLIC BLOOD PRESSURE: 71 MMHG | BODY MASS INDEX: 26.8 KG/M2 | HEART RATE: 72 BPM | TEMPERATURE: 99.4 F | HEIGHT: 63 IN | RESPIRATION RATE: 26 BRPM | SYSTOLIC BLOOD PRESSURE: 120 MMHG

## 2025-08-15 DIAGNOSIS — J96.01 ACUTE HYPOXIC RESPIRATORY FAILURE: Primary | ICD-10-CM

## 2025-08-15 DIAGNOSIS — G56.10 MEDIAN NERVE NEUROPATHY, UNSPECIFIED LATERALITY: ICD-10-CM

## 2025-08-15 DIAGNOSIS — J18.9 COMMUNITY ACQUIRED PNEUMONIA, UNSPECIFIED LATERALITY: ICD-10-CM

## 2025-08-15 DIAGNOSIS — R41.841 COGNITIVE COMMUNICATION DEFICIT: ICD-10-CM

## 2025-08-15 DIAGNOSIS — G93.41 ACUTE METABOLIC ENCEPHALOPATHY: ICD-10-CM

## 2025-08-15 LAB
A-A DO2: 176.6 MMHG (ref 0–300)
ALBUMIN SERPL-MCNC: 3.6 G/DL (ref 3.5–5.2)
ALBUMIN/GLOB SERPL: 2.1 G/DL
ALP SERPL-CCNC: 73 U/L (ref 39–117)
ALT SERPL W P-5'-P-CCNC: 12 U/L (ref 1–33)
ANION GAP SERPL CALCULATED.3IONS-SCNC: 19.9 MMOL/L (ref 5–15)
ARTERIAL PATENCY WRIST A: POSITIVE
AST SERPL-CCNC: 26 U/L (ref 1–32)
ATMOSPHERIC PRESS: 728 MMHG
ATMOSPHERIC PRESS: 728 MMHG
BASE EXCESS BLDA CALC-SCNC: -6.6 MMOL/L (ref 0–2)
BASE EXCESS BLDV CALC-SCNC: -2.5 MMOL/L (ref 0–2)
BASOPHILS # BLD AUTO: 0.02 10*3/MM3 (ref 0–0.2)
BASOPHILS NFR BLD AUTO: 0.2 % (ref 0–1.5)
BDY SITE: ABNORMAL
BDY SITE: ABNORMAL
BILIRUB SERPL-MCNC: 0.2 MG/DL (ref 0–1.2)
BUN SERPL-MCNC: 9.6 MG/DL (ref 8–23)
BUN/CREAT SERPL: 9.7 (ref 7–25)
CALCIUM SPEC-SCNC: 8.4 MG/DL (ref 8.6–10.5)
CHLORIDE SERPL-SCNC: 108 MMOL/L (ref 98–107)
CO2 BLDA-SCNC: 17.9 MMOL/L (ref 22–33)
CO2 BLDA-SCNC: 22.3 MMOL/L (ref 22–33)
CO2 SERPL-SCNC: 16.1 MMOL/L (ref 22–29)
COHGB MFR BLD: 0.4 % (ref 0–5)
COHGB MFR BLD: 0.5 % (ref 0–5)
CREAT SERPL-MCNC: 0.99 MG/DL (ref 0.57–1)
D-LACTATE SERPL-SCNC: 7.1 MMOL/L (ref 0.5–2)
DEPRECATED RDW RBC AUTO: 47.8 FL (ref 37–54)
EGFRCR SERPLBLD CKD-EPI 2021: 65 ML/MIN/1.73
EOSINOPHIL # BLD AUTO: 0 10*3/MM3 (ref 0–0.4)
EOSINOPHIL NFR BLD AUTO: 0 % (ref 0.3–6.2)
ERYTHROCYTE [DISTWIDTH] IN BLOOD BY AUTOMATED COUNT: 13.7 % (ref 12.3–15.4)
GLOBULIN UR ELPH-MCNC: 1.7 GM/DL
GLUCOSE BLDC GLUCOMTR-MCNC: 106 MG/DL (ref 70–130)
GLUCOSE BLDC GLUCOMTR-MCNC: 137 MG/DL (ref 70–130)
GLUCOSE BLDC GLUCOMTR-MCNC: 78 MG/DL (ref 70–130)
GLUCOSE BLDC GLUCOMTR-MCNC: 95 MG/DL (ref 70–130)
GLUCOSE SERPL-MCNC: 148 MG/DL (ref 65–99)
HCO3 BLDA-SCNC: 17 MMOL/L (ref 20–26)
HCO3 BLDV-SCNC: 21.3 MMOL/L (ref 22–28)
HCT VFR BLD AUTO: 36.7 % (ref 34–46.6)
HCT VFR BLD CALC: 36.7 % (ref 38–51)
HGB BLD-MCNC: 11.9 G/DL (ref 12–15.9)
HGB BLDA-MCNC: 12 G/DL (ref 13.5–17.5)
HGB BLDA-MCNC: 12.1 G/DL (ref 13.5–17.5)
IMM GRANULOCYTES # BLD AUTO: 0.1 10*3/MM3 (ref 0–0.05)
IMM GRANULOCYTES NFR BLD AUTO: 0.8 % (ref 0–0.5)
INHALED O2 CONCENTRATION: 35 %
INHALED O2 CONCENTRATION: 50 %
LYMPHOCYTES # BLD AUTO: 0.93 10*3/MM3 (ref 0.7–3.1)
LYMPHOCYTES NFR BLD AUTO: 7.5 % (ref 19.6–45.3)
Lab: ABNORMAL
Lab: ABNORMAL
MAGNESIUM SERPL-MCNC: 1.6 MG/DL (ref 1.6–2.4)
MCH RBC QN AUTO: 30.7 PG (ref 26.6–33)
MCHC RBC AUTO-ENTMCNC: 32.4 G/DL (ref 31.5–35.7)
MCV RBC AUTO: 94.8 FL (ref 79–97)
METHGB BLD QL: 0.5 % (ref 0–3)
METHGB BLD QL: 0.8 % (ref 0–3)
MODALITY: ABNORMAL
MODALITY: ABNORMAL
MONOCYTES # BLD AUTO: 0.28 10*3/MM3 (ref 0.1–0.9)
MONOCYTES NFR BLD AUTO: 2.3 % (ref 5–12)
NEUTROPHILS NFR BLD AUTO: 11.09 10*3/MM3 (ref 1.7–7)
NEUTROPHILS NFR BLD AUTO: 89.2 % (ref 42.7–76)
NOTIFIED BY: ABNORMAL
NOTIFIED WHO: ABNORMAL
NRBC BLD AUTO-RTO: 0 /100 WBC (ref 0–0.2)
OXYHGB MFR BLDV: 79.1 % (ref 45–75)
OXYHGB MFR BLDV: 96.3 % (ref 94–99)
PCO2 BLDA: 27.9 MM HG (ref 35–45)
PCO2 BLDV: 33.1 MM HG (ref 41–51)
PCO2 TEMP ADJ BLD: ABNORMAL MM[HG]
PEEP RESPIRATORY: 5 CM[H2O]
PEEP RESPIRATORY: 5 CM[H2O]
PH BLDA: 7.39 PH UNITS (ref 7.35–7.45)
PH BLDV: 7.42 PH UNITS (ref 7.32–7.42)
PH, TEMP CORRECTED: ABNORMAL
PLATELET # BLD AUTO: 151 10*3/MM3 (ref 140–450)
PMV BLD AUTO: 10.5 FL (ref 6–12)
PO2 BLDA: 134 MM HG (ref 83–108)
PO2 BLDV: 41.6 MM HG (ref 27–53)
PO2 TEMP ADJ BLD: ABNORMAL MM[HG]
POTASSIUM SERPL-SCNC: 3.2 MMOL/L (ref 3.5–5.2)
POTASSIUM SERPL-SCNC: 3.2 MMOL/L (ref 3.5–5.2)
POTASSIUM SERPL-SCNC: 4.1 MMOL/L (ref 3.5–5.2)
PROT SERPL-MCNC: 5.3 G/DL (ref 6–8.5)
QT INTERVAL: 438 MS
QT INTERVAL: 496 MS
QTC INTERVAL: 482 MS
QTC INTERVAL: 492 MS
RBC # BLD AUTO: 3.87 10*6/MM3 (ref 3.77–5.28)
SALICYLATES SERPL-MCNC: 2.8 MG/DL
SAO2 % BLDCOA: 97.2 % (ref 94–99)
SAO2 % BLDCOV: 80.1 % (ref 45–75)
SET MECH RESP RATE: 30
SET MECH RESP RATE: 30
SODIUM SERPL-SCNC: 144 MMOL/L (ref 136–145)
VENTILATOR MODE: ABNORMAL
VENTILATOR MODE: ABNORMAL
VT ON VENT VENT: 400 ML
VT ON VENT VENT: 400 ML
WBC NRBC COR # BLD AUTO: 12.42 10*3/MM3 (ref 3.4–10.8)

## 2025-08-15 PROCEDURE — 87205 SMEAR GRAM STAIN: CPT | Performed by: INTERNAL MEDICINE

## 2025-08-15 PROCEDURE — 83605 ASSAY OF LACTIC ACID: CPT

## 2025-08-15 PROCEDURE — 83050 HGB METHEMOGLOBIN QUAN: CPT

## 2025-08-15 PROCEDURE — 25010000002 ENOXAPARIN PER 10 MG: Performed by: INTERNAL MEDICINE

## 2025-08-15 PROCEDURE — 84100 ASSAY OF PHOSPHORUS: CPT

## 2025-08-15 PROCEDURE — 5A1955Z RESPIRATORY VENTILATION, GREATER THAN 96 CONSECUTIVE HOURS: ICD-10-PCS | Performed by: HOSPITALIST

## 2025-08-15 PROCEDURE — 84132 ASSAY OF SERUM POTASSIUM: CPT | Performed by: HOSPITALIST

## 2025-08-15 PROCEDURE — 84443 ASSAY THYROID STIM HORMONE: CPT

## 2025-08-15 PROCEDURE — 25010000002 CALCIUM GLUCONATE 2-0.675 GM/100ML-% SOLUTION: Performed by: INTERNAL MEDICINE

## 2025-08-15 PROCEDURE — 82805 BLOOD GASES W/O2 SATURATION: CPT

## 2025-08-15 PROCEDURE — 94799 UNLISTED PULMONARY SVC/PX: CPT

## 2025-08-15 PROCEDURE — 84484 ASSAY OF TROPONIN QUANT: CPT

## 2025-08-15 PROCEDURE — 83735 ASSAY OF MAGNESIUM: CPT | Performed by: HOSPITALIST

## 2025-08-15 PROCEDURE — 82948 REAGENT STRIP/BLOOD GLUCOSE: CPT

## 2025-08-15 PROCEDURE — 95819 EEG AWAKE AND ASLEEP: CPT | Performed by: PSYCHIATRY & NEUROLOGY

## 2025-08-15 PROCEDURE — 82550 ASSAY OF CK (CPK): CPT

## 2025-08-15 PROCEDURE — 80053 COMPREHEN METABOLIC PANEL: CPT

## 2025-08-15 PROCEDURE — 84145 PROCALCITONIN (PCT): CPT

## 2025-08-15 PROCEDURE — 99291 CRITICAL CARE FIRST HOUR: CPT | Performed by: PSYCHIATRY & NEUROLOGY

## 2025-08-15 PROCEDURE — 25010000002 LORAZEPAM PER 2 MG

## 2025-08-15 PROCEDURE — 94002 VENT MGMT INPAT INIT DAY: CPT

## 2025-08-15 PROCEDURE — 85025 COMPLETE CBC W/AUTO DIFF WBC: CPT | Performed by: INTERNAL MEDICINE

## 2025-08-15 PROCEDURE — 25010000002 MIDAZOLAM PER 1 MG

## 2025-08-15 PROCEDURE — 25010000002 LACOSAMIDE 200 MG/20ML SOLUTION: Performed by: STUDENT IN AN ORGANIZED HEALTH CARE EDUCATION/TRAINING PROGRAM

## 2025-08-15 PROCEDURE — 99239 HOSP IP/OBS DSCHRG MGMT >30: CPT | Performed by: INTERNAL MEDICINE

## 2025-08-15 PROCEDURE — 82375 ASSAY CARBOXYHB QUANT: CPT

## 2025-08-15 PROCEDURE — 85730 THROMBOPLASTIN TIME PARTIAL: CPT

## 2025-08-15 PROCEDURE — 80179 DRUG ASSAY SALICYLATE: CPT | Performed by: HOSPITALIST

## 2025-08-15 PROCEDURE — 80061 LIPID PANEL: CPT

## 2025-08-15 PROCEDURE — 87070 CULTURE OTHR SPECIMN AEROBIC: CPT | Performed by: INTERNAL MEDICINE

## 2025-08-15 PROCEDURE — 25010000002 AZTREONAM PER 500 MG: Performed by: INTERNAL MEDICINE

## 2025-08-15 PROCEDURE — 99291 CRITICAL CARE FIRST HOUR: CPT | Performed by: STUDENT IN AN ORGANIZED HEALTH CARE EDUCATION/TRAINING PROGRAM

## 2025-08-15 PROCEDURE — 83880 ASSAY OF NATRIURETIC PEPTIDE: CPT

## 2025-08-15 PROCEDURE — 94003 VENT MGMT INPAT SUBQ DAY: CPT

## 2025-08-15 PROCEDURE — 25010000002 MIDAZOLAM PER 1 MG: Performed by: INTERNAL MEDICINE

## 2025-08-15 PROCEDURE — 25810000003 SODIUM CHLORIDE 0.9 % SOLUTION: Performed by: INTERNAL MEDICINE

## 2025-08-15 PROCEDURE — 93005 ELECTROCARDIOGRAM TRACING: CPT

## 2025-08-15 PROCEDURE — 25010000002 VANCOMYCIN 5 G RECONSTITUTED SOLUTION: Performed by: INTERNAL MEDICINE

## 2025-08-15 PROCEDURE — 94761 N-INVAS EAR/PLS OXIMETRY MLT: CPT

## 2025-08-15 PROCEDURE — 25010000002 MIDAZOLAM PER 1 MG: Performed by: PSYCHIATRY & NEUROLOGY

## 2025-08-15 PROCEDURE — 82820 HEMOGLOBIN-OXYGEN AFFINITY: CPT

## 2025-08-15 PROCEDURE — 83605 ASSAY OF LACTIC ACID: CPT | Performed by: INTERNAL MEDICINE

## 2025-08-15 PROCEDURE — 95819 EEG AWAKE AND ASLEEP: CPT

## 2025-08-15 PROCEDURE — 83735 ASSAY OF MAGNESIUM: CPT

## 2025-08-15 PROCEDURE — 25810000003 SODIUM CHLORIDE 0.9 % SOLUTION: Performed by: PSYCHIATRY & NEUROLOGY

## 2025-08-15 PROCEDURE — 82948 REAGENT STRIP/BLOOD GLUCOSE: CPT | Performed by: INTERNAL MEDICINE

## 2025-08-15 PROCEDURE — 85610 PROTHROMBIN TIME: CPT

## 2025-08-15 PROCEDURE — 80053 COMPREHEN METABOLIC PANEL: CPT | Performed by: INTERNAL MEDICINE

## 2025-08-15 PROCEDURE — 25810000003 LACTATED RINGERS SOLUTION: Performed by: HOSPITALIST

## 2025-08-15 PROCEDURE — 84132 ASSAY OF SERUM POTASSIUM: CPT | Performed by: INTERNAL MEDICINE

## 2025-08-15 PROCEDURE — 85025 COMPLETE CBC W/AUTO DIFF WBC: CPT

## 2025-08-15 PROCEDURE — 84439 ASSAY OF FREE THYROXINE: CPT

## 2025-08-15 PROCEDURE — 82330 ASSAY OF CALCIUM: CPT

## 2025-08-15 PROCEDURE — 93010 ELECTROCARDIOGRAM REPORT: CPT | Performed by: INTERNAL MEDICINE

## 2025-08-15 PROCEDURE — 36600 WITHDRAWAL OF ARTERIAL BLOOD: CPT

## 2025-08-15 PROCEDURE — C9254 INJECTION, LACOSAMIDE: HCPCS | Performed by: STUDENT IN AN ORGANIZED HEALTH CARE EDUCATION/TRAINING PROGRAM

## 2025-08-15 PROCEDURE — 99223 1ST HOSP IP/OBS HIGH 75: CPT | Performed by: INTERNAL MEDICINE

## 2025-08-15 RX ORDER — SODIUM CHLORIDE 9 MG/ML
40 INJECTION, SOLUTION INTRAVENOUS AS NEEDED
Status: DISCONTINUED | OUTPATIENT
Start: 2025-08-15 | End: 2025-08-25 | Stop reason: HOSPADM

## 2025-08-15 RX ORDER — SODIUM CHLORIDE 0.9 % (FLUSH) 0.9 %
10 SYRINGE (ML) INJECTION AS NEEDED
Status: DISCONTINUED | OUTPATIENT
Start: 2025-08-15 | End: 2025-08-25 | Stop reason: HOSPADM

## 2025-08-15 RX ORDER — CETIRIZINE HYDROCHLORIDE 10 MG/1
10 TABLET ORAL DAILY
Status: CANCELLED | OUTPATIENT
Start: 2025-08-15

## 2025-08-15 RX ORDER — SODIUM CHLORIDE 0.9 % (FLUSH) 0.9 %
10 SYRINGE (ML) INJECTION EVERY 12 HOURS SCHEDULED
Status: DISCONTINUED | OUTPATIENT
Start: 2025-08-16 | End: 2025-08-25 | Stop reason: HOSPADM

## 2025-08-15 RX ORDER — BISACODYL 5 MG/1
5 TABLET, DELAYED RELEASE ORAL DAILY PRN
Status: DISCONTINUED | OUTPATIENT
Start: 2025-08-15 | End: 2025-08-15 | Stop reason: HOSPADM

## 2025-08-15 RX ORDER — GABAPENTIN 300 MG/1
600 CAPSULE ORAL EVERY 8 HOURS SCHEDULED
Status: CANCELLED | OUTPATIENT
Start: 2025-08-15

## 2025-08-15 RX ORDER — PANTOPRAZOLE SODIUM 40 MG/10ML
40 INJECTION, POWDER, LYOPHILIZED, FOR SOLUTION INTRAVENOUS
Qty: 300 ML | Refills: 0 | Status: SHIPPED | OUTPATIENT
Start: 2025-08-16 | End: 2025-08-25 | Stop reason: HOSPADM

## 2025-08-15 RX ORDER — LEVETIRACETAM 500 MG/5ML
1000 INJECTION, SOLUTION, CONCENTRATE INTRAVENOUS ONCE
Status: DISCONTINUED | OUTPATIENT
Start: 2025-08-15 | End: 2025-08-15

## 2025-08-15 RX ORDER — POLYETHYLENE GLYCOL 3350 17 G/17G
17 POWDER, FOR SOLUTION ORAL DAILY PRN
Status: DISCONTINUED | OUTPATIENT
Start: 2025-08-15 | End: 2025-08-15 | Stop reason: HOSPADM

## 2025-08-15 RX ORDER — IPRATROPIUM BROMIDE AND ALBUTEROL SULFATE 2.5; .5 MG/3ML; MG/3ML
3 SOLUTION RESPIRATORY (INHALATION)
Qty: 360 ML | Refills: 0 | Status: ON HOLD | OUTPATIENT
Start: 2025-08-15 | End: 2025-08-25 | Stop reason: SDUPTHER

## 2025-08-15 RX ORDER — LORAZEPAM 2 MG/ML
2 INJECTION INTRAMUSCULAR EVERY 4 HOURS PRN
Qty: 10 ML | Refills: 0 | Status: SHIPPED | OUTPATIENT
Start: 2025-08-15 | End: 2025-08-25 | Stop reason: HOSPADM

## 2025-08-15 RX ORDER — IPRATROPIUM BROMIDE AND ALBUTEROL SULFATE 2.5; .5 MG/3ML; MG/3ML
3 SOLUTION RESPIRATORY (INHALATION) EVERY 6 HOURS PRN
Status: DISCONTINUED | OUTPATIENT
Start: 2025-08-15 | End: 2025-08-25 | Stop reason: HOSPADM

## 2025-08-15 RX ORDER — BISACODYL 10 MG
10 SUPPOSITORY, RECTAL RECTAL DAILY PRN
Status: DISCONTINUED | OUTPATIENT
Start: 2025-08-15 | End: 2025-08-15 | Stop reason: HOSPADM

## 2025-08-15 RX ORDER — LEVETIRACETAM 500 MG/5ML
500 INJECTION, SOLUTION, CONCENTRATE INTRAVENOUS EVERY 12 HOURS SCHEDULED
Status: DISCONTINUED | OUTPATIENT
Start: 2025-08-15 | End: 2025-08-15

## 2025-08-15 RX ORDER — MIDAZOLAM HYDROCHLORIDE 1 MG/ML
INJECTION, SOLUTION INTRAMUSCULAR; INTRAVENOUS
Status: COMPLETED
Start: 2025-08-15 | End: 2025-08-15

## 2025-08-15 RX ORDER — AMOXICILLIN 250 MG
2 CAPSULE ORAL 2 TIMES DAILY
Status: DISCONTINUED | OUTPATIENT
Start: 2025-08-15 | End: 2025-08-15 | Stop reason: HOSPADM

## 2025-08-15 RX ORDER — LEVOTHYROXINE SODIUM 100 UG/1
100 TABLET ORAL
Qty: 30 TABLET | Refills: 0 | Status: ON HOLD | OUTPATIENT
Start: 2025-08-16 | End: 2025-08-25 | Stop reason: SDUPTHER

## 2025-08-15 RX ORDER — LEVOTHYROXINE SODIUM 50 UG/1
100 TABLET ORAL
Status: DISCONTINUED | OUTPATIENT
Start: 2025-08-15 | End: 2025-08-15 | Stop reason: HOSPADM

## 2025-08-15 RX ORDER — TRAZODONE HYDROCHLORIDE 50 MG/1
50 TABLET ORAL NIGHTLY
Status: CANCELLED | OUTPATIENT
Start: 2025-08-15

## 2025-08-15 RX ORDER — LORAZEPAM 2 MG/ML
2 INJECTION INTRAMUSCULAR
Status: DISCONTINUED | OUTPATIENT
Start: 2025-08-15 | End: 2025-08-15 | Stop reason: HOSPADM

## 2025-08-15 RX ORDER — MIDAZOLAM HYDROCHLORIDE 1 MG/ML
5 INJECTION, SOLUTION INTRAMUSCULAR; INTRAVENOUS
Status: DISCONTINUED | OUTPATIENT
Start: 2025-08-15 | End: 2025-08-15 | Stop reason: HOSPADM

## 2025-08-15 RX ORDER — FENTANYL CITRATE-0.9 % NACL/PF 10 MCG/ML
50-300 PLASTIC BAG, INJECTION (ML) INTRAVENOUS
Qty: 1000 ML | Refills: 0 | Status: SHIPPED | OUTPATIENT
Start: 2025-08-15 | End: 2025-08-25 | Stop reason: HOSPADM

## 2025-08-15 RX ORDER — NOREPINEPHRINE BITARTRATE 0.03 MG/ML
.02-.3 INJECTION, SOLUTION INTRAVENOUS
Qty: 1000 ML | Refills: 0 | Status: SHIPPED | OUTPATIENT
Start: 2025-08-15 | End: 2025-08-25 | Stop reason: HOSPADM

## 2025-08-15 RX ORDER — POTASSIUM CHLORIDE 1.5 G/1.58G
40 POWDER, FOR SOLUTION ORAL EVERY 4 HOURS
Status: COMPLETED | OUTPATIENT
Start: 2025-08-15 | End: 2025-08-15

## 2025-08-15 RX ORDER — LACOSAMIDE 10 MG/ML
100 INJECTION, SOLUTION INTRAVENOUS EVERY 12 HOURS
Qty: 600 ML | Refills: 0 | Status: SHIPPED | OUTPATIENT
Start: 2025-08-15 | End: 2025-08-25 | Stop reason: HOSPADM

## 2025-08-15 RX ORDER — ATORVASTATIN CALCIUM 40 MG/1
40 TABLET, FILM COATED ORAL NIGHTLY
Status: CANCELLED | OUTPATIENT
Start: 2025-08-15

## 2025-08-15 RX ORDER — PANTOPRAZOLE SODIUM 40 MG/10ML
40 INJECTION, POWDER, LYOPHILIZED, FOR SOLUTION INTRAVENOUS
Status: DISCONTINUED | OUTPATIENT
Start: 2025-08-16 | End: 2025-08-23

## 2025-08-15 RX ADMIN — MIDAZOLAM 5 MG: 1 INJECTION INTRAMUSCULAR; INTRAVENOUS at 13:42

## 2025-08-15 RX ADMIN — SODIUM CHLORIDE, POTASSIUM CHLORIDE, SODIUM LACTATE AND CALCIUM CHLORIDE 1000 ML: 600; 310; 30; 20 INJECTION, SOLUTION INTRAVENOUS at 01:22

## 2025-08-15 RX ADMIN — POTASSIUM CHLORIDE 40 MEQ: 1.5 POWDER, FOR SOLUTION ORAL at 05:50

## 2025-08-15 RX ADMIN — MIDAZOLAM 4 MG: 1 INJECTION INTRAMUSCULAR; INTRAVENOUS at 17:43

## 2025-08-15 RX ADMIN — LEVOTHYROXINE SODIUM 100 MCG: 50 TABLET ORAL at 10:04

## 2025-08-15 RX ADMIN — SODIUM BICARBONATE INJECTION, 150 MEQ: 84 SOLUTION INTRAVENOUS at 03:36

## 2025-08-15 RX ADMIN — MIDAZOLAM HYDROCHLORIDE 5 MG: 1 INJECTION, SOLUTION INTRAMUSCULAR; INTRAVENOUS at 07:49

## 2025-08-15 RX ADMIN — VALPROATE SODIUM 500 MG: 100 INJECTION, SOLUTION INTRAVENOUS at 17:27

## 2025-08-15 RX ADMIN — IPRATROPIUM BROMIDE AND ALBUTEROL SULFATE 3 ML: .5; 3 SOLUTION RESPIRATORY (INHALATION) at 00:31

## 2025-08-15 RX ADMIN — Medication 10 ML: at 08:02

## 2025-08-15 RX ADMIN — POTASSIUM CHLORIDE 40 MEQ: 1.5 POWDER, FOR SOLUTION ORAL at 12:35

## 2025-08-15 RX ADMIN — POTASSIUM CHLORIDE 40 MEQ: 1.5 POWDER, FOR SOLUTION ORAL at 17:27

## 2025-08-15 RX ADMIN — IPRATROPIUM BROMIDE AND ALBUTEROL SULFATE 3 ML: .5; 3 SOLUTION RESPIRATORY (INHALATION) at 13:21

## 2025-08-15 RX ADMIN — VALPROATE SODIUM 500 MG: 100 INJECTION, SOLUTION INTRAVENOUS at 05:50

## 2025-08-15 RX ADMIN — MIDAZOLAM 5 MG: 1 INJECTION INTRAMUSCULAR; INTRAVENOUS at 18:44

## 2025-08-15 RX ADMIN — LORAZEPAM 2 MG: 2 INJECTION INTRAMUSCULAR at 09:55

## 2025-08-15 RX ADMIN — MIDAZOLAM 5 MG: 1 INJECTION INTRAMUSCULAR; INTRAVENOUS at 16:55

## 2025-08-15 RX ADMIN — AZTREONAM 2000 MG: 2 INJECTION, POWDER, LYOPHILIZED, FOR SOLUTION INTRAMUSCULAR; INTRAVENOUS at 10:45

## 2025-08-15 RX ADMIN — Medication 10 ML: at 20:31

## 2025-08-15 RX ADMIN — MIDAZOLAM HYDROCHLORIDE 5 MG: 1 INJECTION, SOLUTION INTRAMUSCULAR; INTRAVENOUS at 04:52

## 2025-08-15 RX ADMIN — MIDAZOLAM 5 MG: 1 INJECTION INTRAMUSCULAR; INTRAVENOUS at 11:48

## 2025-08-15 RX ADMIN — AZTREONAM 2000 MG: 2 INJECTION, POWDER, LYOPHILIZED, FOR SOLUTION INTRAMUSCULAR; INTRAVENOUS at 03:02

## 2025-08-15 RX ADMIN — LACOSAMIDE 100 MG: 10 INJECTION INTRAVENOUS at 17:28

## 2025-08-15 RX ADMIN — MIDAZOLAM 5 MG: 1 INJECTION INTRAMUSCULAR; INTRAVENOUS at 15:06

## 2025-08-15 RX ADMIN — AZTREONAM 2000 MG: 2 INJECTION, POWDER, LYOPHILIZED, FOR SOLUTION INTRAMUSCULAR; INTRAVENOUS at 19:20

## 2025-08-15 RX ADMIN — PANTOPRAZOLE SODIUM 40 MG: 40 INJECTION, POWDER, FOR SOLUTION INTRAVENOUS at 05:50

## 2025-08-15 RX ADMIN — IPRATROPIUM BROMIDE AND ALBUTEROL SULFATE 3 ML: .5; 3 SOLUTION RESPIRATORY (INHALATION) at 18:32

## 2025-08-15 RX ADMIN — NOREPINEPHRINE BITARTRATE 0.02 MCG/KG/MIN: 32 SOLUTION INTRAVENOUS at 21:46

## 2025-08-15 RX ADMIN — SODIUM CHLORIDE 0.2 MCG/KG/HR: 9 INJECTION, SOLUTION INTRAVENOUS at 10:19

## 2025-08-15 RX ADMIN — MIDAZOLAM 5 MG: 1 INJECTION INTRAMUSCULAR; INTRAVENOUS at 15:45

## 2025-08-15 RX ADMIN — MUPIROCIN 1 APPLICATION: 20 OINTMENT TOPICAL at 08:01

## 2025-08-15 RX ADMIN — SENNOSIDES, DOCUSATE SODIUM 2 TABLET: 50; 8.6 TABLET, FILM COATED ORAL at 20:30

## 2025-08-15 RX ADMIN — POTASSIUM CHLORIDE 40 MEQ: 1.5 POWDER, FOR SOLUTION ORAL at 02:01

## 2025-08-15 RX ADMIN — VANCOMYCIN HYDROCHLORIDE 1250 MG: 500 INJECTION, POWDER, LYOPHILIZED, FOR SOLUTION INTRAVENOUS at 12:06

## 2025-08-15 RX ADMIN — MIDAZOLAM 5 MG: 1 INJECTION INTRAMUSCULAR; INTRAVENOUS at 20:19

## 2025-08-15 RX ADMIN — MIDAZOLAM 5 MG: 1 INJECTION INTRAMUSCULAR; INTRAVENOUS at 21:52

## 2025-08-15 RX ADMIN — CALCIUM GLUCONATE 2000 MG: 20 INJECTION, SOLUTION INTRAVENOUS at 01:11

## 2025-08-15 RX ADMIN — CALCIUM GLUCONATE 2000 MG: 20 INJECTION, SOLUTION INTRAVENOUS at 03:02

## 2025-08-15 RX ADMIN — SENNOSIDES, DOCUSATE SODIUM 2 TABLET: 50; 8.6 TABLET, FILM COATED ORAL at 08:01

## 2025-08-15 RX ADMIN — VALPROATE SODIUM 500 MG: 100 INJECTION, SOLUTION INTRAVENOUS at 12:22

## 2025-08-15 RX ADMIN — LORAZEPAM 2 MG: 2 INJECTION, SOLUTION INTRAMUSCULAR; INTRAVENOUS at 09:55

## 2025-08-15 RX ADMIN — ENOXAPARIN SODIUM 40 MG: 40 INJECTION SUBCUTANEOUS at 10:04

## 2025-08-15 RX ADMIN — MUPIROCIN 1 APPLICATION: 20 OINTMENT TOPICAL at 20:31

## 2025-08-15 RX ADMIN — IPRATROPIUM BROMIDE AND ALBUTEROL SULFATE 3 ML: .5; 3 SOLUTION RESPIRATORY (INHALATION) at 07:00

## 2025-08-16 ENCOUNTER — APPOINTMENT (OUTPATIENT)
Dept: GENERAL RADIOLOGY | Facility: HOSPITAL | Age: 60
DRG: 917 | End: 2025-08-16
Payer: MEDICAID

## 2025-08-16 ENCOUNTER — APPOINTMENT (OUTPATIENT)
Dept: NEUROLOGY | Facility: HOSPITAL | Age: 60
DRG: 917 | End: 2025-08-16
Payer: MEDICAID

## 2025-08-16 PROBLEM — G40.409 TONIC CLONIC SEIZURES: Status: ACTIVE | Noted: 2025-08-16

## 2025-08-16 PROBLEM — J43.2 CENTRILOBULAR EMPHYSEMA: Status: ACTIVE | Noted: 2025-08-16

## 2025-08-16 PROBLEM — J96.01 ACUTE HYPOXIC RESPIRATORY FAILURE: Status: ACTIVE | Noted: 2025-08-16

## 2025-08-16 PROBLEM — T14.91XA ATTEMPTED SUICIDE: Status: ACTIVE | Noted: 2025-08-16

## 2025-08-16 PROBLEM — G93.41 ACUTE METABOLIC ENCEPHALOPATHY: Status: ACTIVE | Noted: 2025-08-16

## 2025-08-16 LAB
ALBUMIN SERPL-MCNC: 3.7 G/DL (ref 3.5–5.2)
ALBUMIN/GLOB SERPL: 1.7 G/DL
ALP SERPL-CCNC: 78 U/L (ref 39–117)
ALT SERPL W P-5'-P-CCNC: 14 U/L (ref 1–33)
ANION GAP SERPL CALCULATED.3IONS-SCNC: 13.1 MMOL/L (ref 5–15)
APTT PPP: 30.7 SECONDS (ref 22–39)
ARTERIAL PATENCY WRIST A: ABNORMAL
ARTERIAL PATENCY WRIST A: POSITIVE
ARTERIAL PATENCY WRIST A: POSITIVE
AST SERPL-CCNC: 27 U/L (ref 1–32)
ATMOSPHERIC PRESS: ABNORMAL MM[HG]
B PARAPERT DNA SPEC QL NAA+PROBE: NOT DETECTED
B PERT DNA SPEC QL NAA+PROBE: NOT DETECTED
BASE EXCESS BLDA CALC-SCNC: -0.8 MMOL/L (ref 0–2)
BASE EXCESS BLDA CALC-SCNC: -1.5 MMOL/L (ref 0–2)
BASE EXCESS BLDA CALC-SCNC: -2.4 MMOL/L (ref 0–2)
BASOPHILS # BLD AUTO: 0.04 10*3/MM3 (ref 0–0.2)
BASOPHILS NFR BLD AUTO: 0.3 % (ref 0–1.5)
BDY SITE: ABNORMAL
BILIRUB SERPL-MCNC: 0.2 MG/DL (ref 0–1.2)
BODY TEMPERATURE: 37
BUN SERPL-MCNC: 7.7 MG/DL (ref 8–23)
BUN/CREAT SERPL: 8.7 (ref 7–25)
C PNEUM DNA NPH QL NAA+NON-PROBE: NOT DETECTED
CA-I SERPL ISE-MCNC: 1.17 MMOL/L (ref 1.15–1.3)
CALCIUM SPEC-SCNC: 9 MG/DL (ref 8.6–10.5)
CHLORIDE SERPL-SCNC: 111 MMOL/L (ref 98–107)
CHOLEST SERPL-MCNC: 74 MG/DL (ref 0–200)
CK SERPL-CCNC: 100 U/L (ref 20–180)
CK SERPL-CCNC: 92 U/L (ref 20–180)
CO2 BLDA-SCNC: 23.9 MMOL/L (ref 22–33)
CO2 BLDA-SCNC: 24.3 MMOL/L (ref 22–33)
CO2 BLDA-SCNC: 25.6 MMOL/L (ref 22–33)
CO2 SERPL-SCNC: 19.9 MMOL/L (ref 22–29)
COHGB MFR BLD: 0.6 % (ref 0–2)
COHGB MFR BLD: 0.7 % (ref 0–2)
COHGB MFR BLD: 0.7 % (ref 0–2)
CREAT SERPL-MCNC: 0.89 MG/DL (ref 0.57–1)
D-LACTATE SERPL-SCNC: 2.4 MMOL/L (ref 0.5–2)
D-LACTATE SERPL-SCNC: 3.5 MMOL/L (ref 0.5–2)
D-LACTATE SERPL-SCNC: 3.8 MMOL/L (ref 0.5–2)
D-LACTATE SERPL-SCNC: 4.8 MMOL/L (ref 0.5–2)
DEPRECATED RDW RBC AUTO: 47.8 FL (ref 37–54)
EGFRCR SERPLBLD CKD-EPI 2021: 73.9 ML/MIN/1.73
EOSINOPHIL # BLD AUTO: 0.03 10*3/MM3 (ref 0–0.4)
EOSINOPHIL NFR BLD AUTO: 0.2 % (ref 0.3–6.2)
EPAP: 0
ERYTHROCYTE [DISTWIDTH] IN BLOOD BY AUTOMATED COUNT: 13.9 % (ref 12.3–15.4)
FLUAV SUBTYP SPEC NAA+PROBE: NOT DETECTED
FLUBV RNA NPH QL NAA+NON-PROBE: NOT DETECTED
GEN 5 1HR TROPONIN T REFLEX: 48 NG/L
GLOBULIN UR ELPH-MCNC: 2.2 GM/DL
GLUCOSE BLDC GLUCOMTR-MCNC: 101 MG/DL (ref 70–130)
GLUCOSE BLDC GLUCOMTR-MCNC: 190 MG/DL (ref 70–130)
GLUCOSE BLDC GLUCOMTR-MCNC: 62 MG/DL (ref 70–130)
GLUCOSE BLDC GLUCOMTR-MCNC: 67 MG/DL (ref 70–130)
GLUCOSE BLDC GLUCOMTR-MCNC: 78 MG/DL (ref 70–130)
GLUCOSE BLDC GLUCOMTR-MCNC: 98 MG/DL (ref 70–130)
GLUCOSE SERPL-MCNC: 105 MG/DL (ref 65–99)
HADV DNA SPEC NAA+PROBE: NOT DETECTED
HCO3 BLDA-SCNC: 22.7 MMOL/L (ref 20–26)
HCO3 BLDA-SCNC: 23.2 MMOL/L (ref 20–26)
HCO3 BLDA-SCNC: 24.4 MMOL/L (ref 20–26)
HCOV 229E RNA SPEC QL NAA+PROBE: NOT DETECTED
HCOV HKU1 RNA SPEC QL NAA+PROBE: NOT DETECTED
HCOV NL63 RNA SPEC QL NAA+PROBE: NOT DETECTED
HCOV OC43 RNA SPEC QL NAA+PROBE: NOT DETECTED
HCT VFR BLD AUTO: 36.8 % (ref 34–46.6)
HCT VFR BLD CALC: 35.6 % (ref 38–51)
HCT VFR BLD CALC: 36.9 % (ref 38–51)
HCT VFR BLD CALC: 37 % (ref 38–51)
HDLC SERPL-MCNC: 37 MG/DL (ref 40–60)
HGB BLD-MCNC: 12.2 G/DL (ref 12–15.9)
HGB BLDA-MCNC: 11.6 G/DL (ref 14–18)
HGB BLDA-MCNC: 12 G/DL (ref 14–18)
HGB BLDA-MCNC: 12.1 G/DL (ref 14–18)
HMPV RNA NPH QL NAA+NON-PROBE: NOT DETECTED
HPIV1 RNA ISLT QL NAA+PROBE: NOT DETECTED
HPIV2 RNA SPEC QL NAA+PROBE: NOT DETECTED
HPIV3 RNA NPH QL NAA+PROBE: NOT DETECTED
HPIV4 P GENE NPH QL NAA+PROBE: NOT DETECTED
IMM GRANULOCYTES # BLD AUTO: 0.11 10*3/MM3 (ref 0–0.05)
IMM GRANULOCYTES NFR BLD AUTO: 0.7 % (ref 0–0.5)
INHALED O2 CONCENTRATION: 60 %
INHALED O2 CONCENTRATION: 90 %
INHALED O2 CONCENTRATION: 90 %
INR PPP: 1.15 (ref 0.89–1.12)
IPAP: 0
L PNEUMO1 AG UR QL IA: NEGATIVE
LDLC SERPL CALC-MCNC: 17 MG/DL (ref 0–100)
LDLC/HDLC SERPL: 0.44 {RATIO}
LYMPHOCYTES # BLD AUTO: 2.55 10*3/MM3 (ref 0.7–3.1)
LYMPHOCYTES NFR BLD AUTO: 16.7 % (ref 19.6–45.3)
M PNEUMO IGG SER IA-ACNC: NOT DETECTED
MAGNESIUM SERPL-MCNC: 1.6 MG/DL (ref 1.6–2.4)
MAGNESIUM SERPL-MCNC: 1.6 MG/DL (ref 1.6–2.4)
MAGNESIUM SERPL-MCNC: 2.7 MG/DL (ref 1.6–2.4)
MCH RBC QN AUTO: 30.5 PG (ref 26.6–33)
MCHC RBC AUTO-ENTMCNC: 33.2 G/DL (ref 31.5–35.7)
MCV RBC AUTO: 92 FL (ref 79–97)
METHGB BLD QL: 0.7 % (ref 0–1.5)
METHGB BLD QL: 0.8 % (ref 0–1.5)
METHGB BLD QL: 1 % (ref 0–1.5)
MODALITY: ABNORMAL
MONOCYTES # BLD AUTO: 0.89 10*3/MM3 (ref 0.1–0.9)
MONOCYTES NFR BLD AUTO: 5.8 % (ref 5–12)
MRSA DNA SPEC QL NAA+PROBE: NEGATIVE
NEUTROPHILS NFR BLD AUTO: 11.62 10*3/MM3 (ref 1.7–7)
NEUTROPHILS NFR BLD AUTO: 76.3 % (ref 42.7–76)
NRBC BLD AUTO-RTO: 0 /100 WBC (ref 0–0.2)
NT-PROBNP SERPL-MCNC: 633 PG/ML (ref 0–900)
OXYHGB MFR BLDV: 92.2 % (ref 94–99)
OXYHGB MFR BLDV: 95.3 % (ref 94–99)
OXYHGB MFR BLDV: 96.3 % (ref 94–99)
PAW @ PEAK INSP FLOW SETTING VENT: 0 CMH2O
PCO2 BLDA: 37.9 MM HG (ref 35–45)
PCO2 BLDA: 39.2 MM HG (ref 35–45)
PCO2 BLDA: 41.2 MM HG (ref 35–45)
PCO2 TEMP ADJ BLD: 37.9 MM HG (ref 35–45)
PCO2 TEMP ADJ BLD: 39.2 MM HG (ref 35–45)
PCO2 TEMP ADJ BLD: 41.2 MM HG (ref 35–45)
PEEP RESPIRATORY: 5 CM[H2O]
PEEP RESPIRATORY: 5 CM[H2O]
PEEP RESPIRATORY: 8 CM[H2O]
PH BLDA: 7.37 PH UNITS (ref 7.35–7.45)
PH BLDA: 7.38 PH UNITS (ref 7.35–7.45)
PH BLDA: 7.39 PH UNITS (ref 7.35–7.45)
PH, TEMP CORRECTED: 7.37 PH UNITS
PH, TEMP CORRECTED: 7.38 PH UNITS
PH, TEMP CORRECTED: 7.39 PH UNITS
PHOSPHATE SERPL-MCNC: 1.9 MG/DL (ref 2.5–4.5)
PHOSPHATE SERPL-MCNC: 2 MG/DL (ref 2.5–4.5)
PHOSPHATE SERPL-MCNC: 2.8 MG/DL (ref 2.5–4.5)
PLATELET # BLD AUTO: 137 10*3/MM3 (ref 140–450)
PMV BLD AUTO: 10.9 FL (ref 6–12)
PO2 BLDA: 110 MM HG (ref 83–108)
PO2 BLDA: 68.5 MM HG (ref 83–108)
PO2 BLDA: 95.2 MM HG (ref 83–108)
PO2 TEMP ADJ BLD: 110 MM HG (ref 83–108)
PO2 TEMP ADJ BLD: 68.5 MM HG (ref 83–108)
PO2 TEMP ADJ BLD: 95.2 MM HG (ref 83–108)
POTASSIUM SERPL-SCNC: 3.8 MMOL/L (ref 3.5–5.2)
PROCALCITONIN SERPL-MCNC: 0.05 NG/ML (ref 0–0.25)
PROT SERPL-MCNC: 5.9 G/DL (ref 6–8.5)
PROTHROMBIN TIME: 15.4 SECONDS (ref 12.2–15.3)
QT INTERVAL: 366 MS
QT INTERVAL: 446 MS
QTC INTERVAL: 425 MS
QTC INTERVAL: 474 MS
RBC # BLD AUTO: 4 10*6/MM3 (ref 3.77–5.28)
RHINOVIRUS RNA SPEC NAA+PROBE: NOT DETECTED
RSV RNA NPH QL NAA+NON-PROBE: NOT DETECTED
S PNEUM AG SPEC QL LA: NEGATIVE
SARS-COV-2 RNA RESP QL NAA+PROBE: NOT DETECTED
SODIUM SERPL-SCNC: 144 MMOL/L (ref 136–145)
T4 FREE SERPL-MCNC: 1.09 NG/DL (ref 0.92–1.68)
TOTAL RATE: 0 BREATHS/MINUTE
TOTAL RATE: 16 BREATHS/MINUTE
TOTAL RATE: 16 BREATHS/MINUTE
TRIGL SERPL-MCNC: 103 MG/DL (ref 0–150)
TROPONIN T NUMERIC DELTA: 39 NG/L
TROPONIN T SERPL HS-MCNC: 9 NG/L
TSH SERPL DL<=0.05 MIU/L-ACNC: 7.52 UIU/ML (ref 0.27–4.2)
VENTILATOR MODE: ABNORMAL
VLDLC SERPL-MCNC: 20 MG/DL (ref 5–40)
VT ON VENT VENT: 0.42 ML
VT ON VENT VENT: 0.42 ML
WBC NRBC COR # BLD AUTO: 15.24 10*3/MM3 (ref 3.4–10.8)

## 2025-08-16 PROCEDURE — 25010000002 LACOSAMIDE 200 MG/20ML SOLUTION

## 2025-08-16 PROCEDURE — 87070 CULTURE OTHR SPECIMN AEROBIC: CPT

## 2025-08-16 PROCEDURE — 0202U NFCT DS 22 TRGT SARS-COV-2: CPT

## 2025-08-16 PROCEDURE — 94640 AIRWAY INHALATION TREATMENT: CPT

## 2025-08-16 PROCEDURE — 99232 SBSQ HOSP IP/OBS MODERATE 35: CPT | Performed by: PSYCHIATRY & NEUROLOGY

## 2025-08-16 PROCEDURE — 87449 NOS EACH ORGANISM AG IA: CPT

## 2025-08-16 PROCEDURE — 94761 N-INVAS EAR/PLS OXIMETRY MLT: CPT

## 2025-08-16 PROCEDURE — 95714 VEEG EA 12-26 HR UNMNTR: CPT

## 2025-08-16 PROCEDURE — 84484 ASSAY OF TROPONIN QUANT: CPT

## 2025-08-16 PROCEDURE — 31645 BRNCHSC W/THER ASPIR 1ST: CPT | Performed by: INTERNAL MEDICINE

## 2025-08-16 PROCEDURE — 36600 WITHDRAWAL OF ARTERIAL BLOOD: CPT

## 2025-08-16 PROCEDURE — 25010000002 MAGNESIUM SULFATE 4 GM/100ML SOLUTION

## 2025-08-16 PROCEDURE — 93010 ELECTROCARDIOGRAM REPORT: CPT | Performed by: INTERNAL MEDICINE

## 2025-08-16 PROCEDURE — 82805 BLOOD GASES W/O2 SATURATION: CPT

## 2025-08-16 PROCEDURE — 99291 CRITICAL CARE FIRST HOUR: CPT | Performed by: INTERNAL MEDICINE

## 2025-08-16 PROCEDURE — 83735 ASSAY OF MAGNESIUM: CPT

## 2025-08-16 PROCEDURE — 03HY32Z INSERTION OF MONITORING DEVICE INTO UPPER ARTERY, PERCUTANEOUS APPROACH: ICD-10-PCS | Performed by: INTERNAL MEDICINE

## 2025-08-16 PROCEDURE — 74018 RADEX ABDOMEN 1 VIEW: CPT

## 2025-08-16 PROCEDURE — 36620 INSERTION CATHETER ARTERY: CPT | Performed by: PHYSICIAN ASSISTANT

## 2025-08-16 PROCEDURE — 82375 ASSAY CARBOXYHB QUANT: CPT

## 2025-08-16 PROCEDURE — 71045 X-RAY EXAM CHEST 1 VIEW: CPT

## 2025-08-16 PROCEDURE — 25010000002 LORAZEPAM PER 2 MG

## 2025-08-16 PROCEDURE — 87641 MR-STAPH DNA AMP PROBE: CPT | Performed by: STUDENT IN AN ORGANIZED HEALTH CARE EDUCATION/TRAINING PROGRAM

## 2025-08-16 PROCEDURE — 25010000002 HEPARIN (PORCINE) PER 1000 UNITS: Performed by: INTERNAL MEDICINE

## 2025-08-16 PROCEDURE — 94799 UNLISTED PULMONARY SVC/PX: CPT

## 2025-08-16 PROCEDURE — C9254 INJECTION, LACOSAMIDE: HCPCS

## 2025-08-16 PROCEDURE — 25010000002 PROPOFOL 10 MG/ML EMULSION

## 2025-08-16 PROCEDURE — 83050 HGB METHEMOGLOBIN QUAN: CPT

## 2025-08-16 PROCEDURE — 82948 REAGENT STRIP/BLOOD GLUCOSE: CPT

## 2025-08-16 PROCEDURE — 83605 ASSAY OF LACTIC ACID: CPT

## 2025-08-16 PROCEDURE — 87205 SMEAR GRAM STAIN: CPT

## 2025-08-16 PROCEDURE — 95720 EEG PHY/QHP EA INCR W/VEEG: CPT | Performed by: PSYCHIATRY & NEUROLOGY

## 2025-08-16 PROCEDURE — 94003 VENT MGMT INPAT SUBQ DAY: CPT

## 2025-08-16 PROCEDURE — 25010000002 DOXYCYCLINE 100 MG RECONSTITUTED SOLUTION 1 EACH VIAL

## 2025-08-16 PROCEDURE — 25010000002 CEFTRIAXONE PER 250 MG

## 2025-08-16 PROCEDURE — 84100 ASSAY OF PHOSPHORUS: CPT

## 2025-08-16 PROCEDURE — 93005 ELECTROCARDIOGRAM TRACING: CPT | Performed by: STUDENT IN AN ORGANIZED HEALTH CARE EDUCATION/TRAINING PROGRAM

## 2025-08-16 PROCEDURE — 4A133J1 MONITORING OF ARTERIAL PULSE, PERIPHERAL, PERCUTANEOUS APPROACH: ICD-10-PCS | Performed by: INTERNAL MEDICINE

## 2025-08-16 PROCEDURE — 82550 ASSAY OF CK (CPK): CPT

## 2025-08-16 PROCEDURE — 0BJ08ZZ INSPECTION OF TRACHEOBRONCHIAL TREE, VIA NATURAL OR ARTIFICIAL OPENING ENDOSCOPIC: ICD-10-PCS | Performed by: OTOLARYNGOLOGY

## 2025-08-16 PROCEDURE — 4A133B1 MONITORING OF ARTERIAL PRESSURE, PERIPHERAL, PERCUTANEOUS APPROACH: ICD-10-PCS | Performed by: INTERNAL MEDICINE

## 2025-08-16 RX ORDER — AMOXICILLIN 250 MG
2 CAPSULE ORAL 2 TIMES DAILY
Status: DISCONTINUED | OUTPATIENT
Start: 2025-08-16 | End: 2025-08-21

## 2025-08-16 RX ORDER — LORAZEPAM 2 MG/ML
1 INJECTION INTRAMUSCULAR EVERY 4 HOURS PRN
Status: DISCONTINUED | OUTPATIENT
Start: 2025-08-16 | End: 2025-08-16

## 2025-08-16 RX ORDER — DEXTROSE MONOHYDRATE 25 G/50ML
50 INJECTION, SOLUTION INTRAVENOUS
Status: DISCONTINUED | OUTPATIENT
Start: 2025-08-16 | End: 2025-08-25 | Stop reason: HOSPADM

## 2025-08-16 RX ORDER — PROPOFOL 10 MG/ML
VIAL (ML) INTRAVENOUS
Status: COMPLETED
Start: 2025-08-16 | End: 2025-08-16

## 2025-08-16 RX ORDER — POLYETHYLENE GLYCOL 3350 17 G/17G
17 POWDER, FOR SOLUTION ORAL DAILY PRN
Status: DISCONTINUED | OUTPATIENT
Start: 2025-08-16 | End: 2025-08-21

## 2025-08-16 RX ORDER — DEXTROSE MONOHYDRATE 100 MG/ML
30 INJECTION, SOLUTION INTRAVENOUS CONTINUOUS
Status: DISCONTINUED | OUTPATIENT
Start: 2025-08-16 | End: 2025-08-23

## 2025-08-16 RX ORDER — DEXMEDETOMIDINE HYDROCHLORIDE 4 UG/ML
.2-1.5 INJECTION, SOLUTION INTRAVENOUS
Status: DISCONTINUED | OUTPATIENT
Start: 2025-08-16 | End: 2025-08-16

## 2025-08-16 RX ORDER — MAGNESIUM SULFATE HEPTAHYDRATE 40 MG/ML
4 INJECTION, SOLUTION INTRAVENOUS ONCE
Status: COMPLETED | OUTPATIENT
Start: 2025-08-16 | End: 2025-08-16

## 2025-08-16 RX ORDER — HEPARIN SODIUM 5000 [USP'U]/ML
5000 INJECTION, SOLUTION INTRAVENOUS; SUBCUTANEOUS EVERY 8 HOURS SCHEDULED
Status: DISCONTINUED | OUTPATIENT
Start: 2025-08-16 | End: 2025-08-25 | Stop reason: HOSPADM

## 2025-08-16 RX ORDER — LEVOTHYROXINE SODIUM 100 UG/1
100 TABLET ORAL
Status: DISCONTINUED | OUTPATIENT
Start: 2025-08-16 | End: 2025-08-21

## 2025-08-16 RX ORDER — LORAZEPAM 2 MG/ML
2 INJECTION INTRAMUSCULAR EVERY 4 HOURS PRN
Status: ACTIVE | OUTPATIENT
Start: 2025-08-16 | End: 2025-08-21

## 2025-08-16 RX ORDER — IPRATROPIUM BROMIDE AND ALBUTEROL SULFATE 2.5; .5 MG/3ML; MG/3ML
3 SOLUTION RESPIRATORY (INHALATION)
Status: DISCONTINUED | OUTPATIENT
Start: 2025-08-16 | End: 2025-08-25 | Stop reason: HOSPADM

## 2025-08-16 RX ORDER — NOREPINEPHRINE BITARTRATE 0.03 MG/ML
.02-.3 INJECTION, SOLUTION INTRAVENOUS
Status: DISCONTINUED | OUTPATIENT
Start: 2025-08-16 | End: 2025-08-21

## 2025-08-16 RX ORDER — LACOSAMIDE 10 MG/ML
50 INJECTION INTRAVENOUS EVERY 12 HOURS SCHEDULED
Status: DISCONTINUED | OUTPATIENT
Start: 2025-08-16 | End: 2025-08-17

## 2025-08-16 RX ADMIN — LEVOTHYROXINE SODIUM 100 MCG: 0.1 TABLET ORAL at 05:47

## 2025-08-16 RX ADMIN — PROPOFOL 20 MCG/KG/MIN: 10 INJECTION, EMULSION INTRAVENOUS at 02:45

## 2025-08-16 RX ADMIN — LORAZEPAM 2 MG: 2 INJECTION INTRAMUSCULAR; INTRAVENOUS at 02:30

## 2025-08-16 RX ADMIN — SODIUM CHLORIDE 2000 MG: 900 INJECTION INTRAVENOUS at 02:20

## 2025-08-16 RX ADMIN — IPRATROPIUM BROMIDE AND ALBUTEROL SULFATE 3 ML: .5; 3 SOLUTION RESPIRATORY (INHALATION) at 19:20

## 2025-08-16 RX ADMIN — DOXYCYCLINE 100 MG: 100 INJECTION, POWDER, LYOPHILIZED, FOR SOLUTION INTRAVENOUS at 02:19

## 2025-08-16 RX ADMIN — IPRATROPIUM BROMIDE AND ALBUTEROL SULFATE 3 ML: .5; 3 SOLUTION RESPIRATORY (INHALATION) at 15:29

## 2025-08-16 RX ADMIN — MAGNESIUM SULFATE IN WATER FOR 4 G: 40 INJECTION INTRAVENOUS at 01:07

## 2025-08-16 RX ADMIN — Medication 10 ML: at 20:07

## 2025-08-16 RX ADMIN — DEXTROSE MONOHYDRATE 30 ML/HR: 100 INJECTION, SOLUTION INTRAVENOUS at 20:05

## 2025-08-16 RX ADMIN — LORAZEPAM 1 MG: 2 INJECTION INTRAMUSCULAR; INTRAVENOUS at 01:58

## 2025-08-16 RX ADMIN — NOREPINEPHRINE BITARTRATE 0.08 MCG/KG/MIN: 32 SOLUTION INTRAVENOUS at 23:50

## 2025-08-16 RX ADMIN — LACOSAMIDE 50 MG: 10 INJECTION INTRAVENOUS at 17:16

## 2025-08-16 RX ADMIN — LACOSAMIDE 50 MG: 10 INJECTION INTRAVENOUS at 05:47

## 2025-08-16 RX ADMIN — VALPROATE SODIUM 500 MG: 100 INJECTION, SOLUTION INTRAVENOUS at 02:00

## 2025-08-16 RX ADMIN — MUPIROCIN 1 APPLICATION: 20 OINTMENT TOPICAL at 20:07

## 2025-08-16 RX ADMIN — HEPARIN SODIUM 5000 UNITS: 5000 INJECTION INTRAVENOUS; SUBCUTANEOUS at 22:36

## 2025-08-16 RX ADMIN — SENNOSIDES, DOCUSATE SODIUM 2 TABLET: 50; 8.6 TABLET, FILM COATED ORAL at 12:01

## 2025-08-16 RX ADMIN — IPRATROPIUM BROMIDE AND ALBUTEROL SULFATE 3 ML: .5; 3 SOLUTION RESPIRATORY (INHALATION) at 07:25

## 2025-08-16 RX ADMIN — PROPOFOL 30 MCG/KG/MIN: 10 INJECTION, EMULSION INTRAVENOUS at 09:18

## 2025-08-16 RX ADMIN — SODIUM CHLORIDE 15 MMOL: 9 INJECTION, SOLUTION INTRAVENOUS at 02:30

## 2025-08-16 RX ADMIN — HEPARIN SODIUM 5000 UNITS: 5000 INJECTION INTRAVENOUS; SUBCUTANEOUS at 14:15

## 2025-08-16 RX ADMIN — PROPOFOL 20 MCG/KG/MIN: 10 INJECTION, EMULSION INTRAVENOUS at 17:16

## 2025-08-16 RX ADMIN — MUPIROCIN 1 APPLICATION: 20 OINTMENT TOPICAL at 08:37

## 2025-08-16 RX ADMIN — DEXTROSE MONOHYDRATE 50 ML: 25 INJECTION, SOLUTION INTRAVENOUS at 18:22

## 2025-08-16 RX ADMIN — MUPIROCIN 1 APPLICATION: 20 OINTMENT TOPICAL at 00:03

## 2025-08-16 RX ADMIN — SENNOSIDES, DOCUSATE SODIUM 2 TABLET: 50; 8.6 TABLET, FILM COATED ORAL at 20:06

## 2025-08-16 RX ADMIN — DOXYCYCLINE 100 MG: 100 INJECTION, POWDER, LYOPHILIZED, FOR SOLUTION INTRAVENOUS at 12:01

## 2025-08-16 RX ADMIN — VALPROATE SODIUM 500 MG: 100 INJECTION, SOLUTION INTRAVENOUS at 22:32

## 2025-08-16 RX ADMIN — PANTOPRAZOLE SODIUM 40 MG: 40 INJECTION, POWDER, FOR SOLUTION INTRAVENOUS at 05:47

## 2025-08-16 RX ADMIN — VALPROATE SODIUM 500 MG: 100 INJECTION, SOLUTION INTRAVENOUS at 14:15

## 2025-08-16 RX ADMIN — SODIUM CHLORIDE 2000 MG: 900 INJECTION INTRAVENOUS at 20:15

## 2025-08-16 RX ADMIN — DEXTROSE MONOHYDRATE 50 ML: 25 INJECTION, SOLUTION INTRAVENOUS at 12:18

## 2025-08-16 RX ADMIN — IPRATROPIUM BROMIDE AND ALBUTEROL SULFATE 3 ML: .5; 3 SOLUTION RESPIRATORY (INHALATION) at 11:07

## 2025-08-17 ENCOUNTER — APPOINTMENT (OUTPATIENT)
Dept: NEUROLOGY | Facility: HOSPITAL | Age: 60
DRG: 917 | End: 2025-08-17
Payer: MEDICAID

## 2025-08-17 ENCOUNTER — APPOINTMENT (OUTPATIENT)
Dept: GENERAL RADIOLOGY | Facility: HOSPITAL | Age: 60
DRG: 917 | End: 2025-08-17
Payer: MEDICAID

## 2025-08-17 PROBLEM — G90.81 SEROTONIN SYNDROME: Status: ACTIVE | Noted: 2025-08-17

## 2025-08-17 LAB
AMMONIA BLD-SCNC: 173 UMOL/L (ref 11–51)
ANION GAP SERPL CALCULATED.3IONS-SCNC: 10.1 MMOL/L (ref 5–15)
ARTERIAL PATENCY WRIST A: ABNORMAL
ATMOSPHERIC PRESS: ABNORMAL MM[HG]
BACTERIA SPEC RESP CULT: NORMAL
BASE EXCESS BLDA CALC-SCNC: 0.5 MMOL/L (ref 0–2)
BDY SITE: ABNORMAL
BODY TEMPERATURE: 37
BUN SERPL-MCNC: 5.9 MG/DL (ref 8–23)
BUN/CREAT SERPL: 7.8 (ref 7–25)
CALCIUM SPEC-SCNC: 8.1 MG/DL (ref 8.6–10.5)
CHLORIDE SERPL-SCNC: 110 MMOL/L (ref 98–107)
CO2 BLDA-SCNC: 25.9 MMOL/L (ref 22–33)
CO2 SERPL-SCNC: 21.9 MMOL/L (ref 22–29)
COHGB MFR BLD: 0.8 % (ref 0–2)
CREAT SERPL-MCNC: 0.76 MG/DL (ref 0.57–1)
DEPRECATED RDW RBC AUTO: 49.4 FL (ref 37–54)
EGFRCR SERPLBLD CKD-EPI 2021: 89.3 ML/MIN/1.73
EPAP: 0
ERYTHROCYTE [DISTWIDTH] IN BLOOD BY AUTOMATED COUNT: 14.3 % (ref 12.3–15.4)
GLUCOSE BLDC GLUCOMTR-MCNC: 105 MG/DL (ref 70–130)
GLUCOSE BLDC GLUCOMTR-MCNC: 113 MG/DL (ref 70–130)
GLUCOSE BLDC GLUCOMTR-MCNC: 113 MG/DL (ref 70–130)
GLUCOSE BLDC GLUCOMTR-MCNC: 98 MG/DL (ref 70–130)
GLUCOSE SERPL-MCNC: 97 MG/DL (ref 65–99)
GRAM STN SPEC: NORMAL
HCO3 BLDA-SCNC: 24.7 MMOL/L (ref 20–26)
HCT VFR BLD AUTO: 31.8 % (ref 34–46.6)
HCT VFR BLD CALC: 33.8 % (ref 38–51)
HGB BLD-MCNC: 10.5 G/DL (ref 12–15.9)
HGB BLDA-MCNC: 11 G/DL (ref 14–18)
INHALED O2 CONCENTRATION: 50 %
IPAP: 0
MAGNESIUM SERPL-MCNC: 2.3 MG/DL (ref 1.6–2.4)
MCH RBC QN AUTO: 30.8 PG (ref 26.6–33)
MCHC RBC AUTO-ENTMCNC: 33 G/DL (ref 31.5–35.7)
MCV RBC AUTO: 93.3 FL (ref 79–97)
METHGB BLD QL: 0.9 % (ref 0–1.5)
MODALITY: ABNORMAL
OXYHGB MFR BLDV: 96.4 % (ref 94–99)
PAW @ PEAK INSP FLOW SETTING VENT: 0 CMH2O
PCO2 BLDA: 37.4 MM HG (ref 35–45)
PCO2 TEMP ADJ BLD: 37.4 MM HG (ref 35–45)
PEEP RESPIRATORY: 8 CM[H2O]
PH BLDA: 7.43 PH UNITS (ref 7.35–7.45)
PH, TEMP CORRECTED: 7.43 PH UNITS
PLATELET # BLD AUTO: 128 10*3/MM3 (ref 140–450)
PMV BLD AUTO: 11.1 FL (ref 6–12)
PO2 BLDA: 104 MM HG (ref 83–108)
PO2 TEMP ADJ BLD: 104 MM HG (ref 83–108)
POTASSIUM SERPL-SCNC: 3.7 MMOL/L (ref 3.5–5.2)
RBC # BLD AUTO: 3.41 10*6/MM3 (ref 3.77–5.28)
SODIUM SERPL-SCNC: 142 MMOL/L (ref 136–145)
TOTAL RATE: 0 BREATHS/MINUTE
VALPROATE SERPL-MCNC: 43.2 MCG/ML (ref 50–125)
VENTILATOR MODE: ABNORMAL
VT ON VENT VENT: 0.42 ML
WBC NRBC COR # BLD AUTO: 19.44 10*3/MM3 (ref 3.4–10.8)

## 2025-08-17 PROCEDURE — 94761 N-INVAS EAR/PLS OXIMETRY MLT: CPT

## 2025-08-17 PROCEDURE — 25010000002 HEPARIN (PORCINE) PER 1000 UNITS: Performed by: INTERNAL MEDICINE

## 2025-08-17 PROCEDURE — 82550 ASSAY OF CK (CPK): CPT | Performed by: PSYCHIATRY & NEUROLOGY

## 2025-08-17 PROCEDURE — C9254 INJECTION, LACOSAMIDE: HCPCS

## 2025-08-17 PROCEDURE — 82948 REAGENT STRIP/BLOOD GLUCOSE: CPT

## 2025-08-17 PROCEDURE — 25010000002 CEFEPIME PER 500 MG: Performed by: INTERNAL MEDICINE

## 2025-08-17 PROCEDURE — 82552 ASSAY OF CPK IN BLOOD: CPT | Performed by: PSYCHIATRY & NEUROLOGY

## 2025-08-17 PROCEDURE — 25010000002 MIDAZOLAM PER 1 MG: Performed by: INTERNAL MEDICINE

## 2025-08-17 PROCEDURE — 25010000002 DOXYCYCLINE 100 MG RECONSTITUTED SOLUTION 1 EACH VIAL

## 2025-08-17 PROCEDURE — 25010000002 LACOSAMIDE 200 MG/20ML SOLUTION

## 2025-08-17 PROCEDURE — 80048 BASIC METABOLIC PNL TOTAL CA: CPT

## 2025-08-17 PROCEDURE — 94003 VENT MGMT INPAT SUBQ DAY: CPT

## 2025-08-17 PROCEDURE — 94799 UNLISTED PULMONARY SVC/PX: CPT

## 2025-08-17 PROCEDURE — 99232 SBSQ HOSP IP/OBS MODERATE 35: CPT | Performed by: PSYCHIATRY & NEUROLOGY

## 2025-08-17 PROCEDURE — 82375 ASSAY CARBOXYHB QUANT: CPT

## 2025-08-17 PROCEDURE — 25010000002 PROPOFOL 10 MG/ML EMULSION

## 2025-08-17 PROCEDURE — 93010 ELECTROCARDIOGRAM REPORT: CPT | Performed by: INTERNAL MEDICINE

## 2025-08-17 PROCEDURE — 80164 ASSAY DIPROPYLACETIC ACD TOT: CPT | Performed by: PSYCHIATRY & NEUROLOGY

## 2025-08-17 PROCEDURE — 83050 HGB METHEMOGLOBIN QUAN: CPT

## 2025-08-17 PROCEDURE — 99291 CRITICAL CARE FIRST HOUR: CPT | Performed by: INTERNAL MEDICINE

## 2025-08-17 PROCEDURE — 83735 ASSAY OF MAGNESIUM: CPT

## 2025-08-17 PROCEDURE — 93005 ELECTROCARDIOGRAM TRACING: CPT | Performed by: STUDENT IN AN ORGANIZED HEALTH CARE EDUCATION/TRAINING PROGRAM

## 2025-08-17 PROCEDURE — 85027 COMPLETE CBC AUTOMATED: CPT

## 2025-08-17 PROCEDURE — 71045 X-RAY EXAM CHEST 1 VIEW: CPT

## 2025-08-17 PROCEDURE — 82805 BLOOD GASES W/O2 SATURATION: CPT

## 2025-08-17 PROCEDURE — 95720 EEG PHY/QHP EA INCR W/VEEG: CPT | Performed by: PSYCHIATRY & NEUROLOGY

## 2025-08-17 PROCEDURE — 82140 ASSAY OF AMMONIA: CPT | Performed by: PSYCHIATRY & NEUROLOGY

## 2025-08-17 PROCEDURE — 95714 VEEG EA 12-26 HR UNMNTR: CPT

## 2025-08-17 RX ORDER — CYPROHEPTADINE HYDROCHLORIDE 4 MG/1
2 TABLET ORAL
Status: DISCONTINUED | OUTPATIENT
Start: 2025-08-17 | End: 2025-08-18

## 2025-08-17 RX ORDER — CYPROHEPTADINE HYDROCHLORIDE 4 MG/1
12 TABLET ORAL ONCE
Status: COMPLETED | OUTPATIENT
Start: 2025-08-17 | End: 2025-08-17

## 2025-08-17 RX ORDER — MIDAZOLAM HYDROCHLORIDE 1 MG/ML
2 INJECTION, SOLUTION INTRAMUSCULAR; INTRAVENOUS ONCE
Status: COMPLETED | OUTPATIENT
Start: 2025-08-17 | End: 2025-08-17

## 2025-08-17 RX ORDER — LACTULOSE 10 G/15ML
20 SOLUTION ORAL 4 TIMES DAILY
Status: DISCONTINUED | OUTPATIENT
Start: 2025-08-17 | End: 2025-08-18

## 2025-08-17 RX ADMIN — LACTULOSE SOLUTION USP, 10 G/15 ML 20 G: 10 SOLUTION ORAL; RECTAL at 12:09

## 2025-08-17 RX ADMIN — PROPOFOL 40 MCG/KG/MIN: 10 INJECTION, EMULSION INTRAVENOUS at 23:11

## 2025-08-17 RX ADMIN — VALPROATE SODIUM 500 MG: 100 INJECTION, SOLUTION INTRAVENOUS at 05:34

## 2025-08-17 RX ADMIN — CYPROHEPTADINE HYDROCHLORIDE 12 MG: 4 TABLET ORAL at 12:08

## 2025-08-17 RX ADMIN — Medication 10 ML: at 20:10

## 2025-08-17 RX ADMIN — IPRATROPIUM BROMIDE AND ALBUTEROL SULFATE 3 ML: .5; 3 SOLUTION RESPIRATORY (INHALATION) at 07:52

## 2025-08-17 RX ADMIN — LACOSAMIDE 50 MG: 10 INJECTION INTRAVENOUS at 05:39

## 2025-08-17 RX ADMIN — LEVOTHYROXINE SODIUM 100 MCG: 0.1 TABLET ORAL at 05:41

## 2025-08-17 RX ADMIN — PROPOFOL 20 MCG/KG/MIN: 10 INJECTION, EMULSION INTRAVENOUS at 10:16

## 2025-08-17 RX ADMIN — SENNOSIDES, DOCUSATE SODIUM 2 TABLET: 50; 8.6 TABLET, FILM COATED ORAL at 20:09

## 2025-08-17 RX ADMIN — CYPROHEPTADINE HYDROCHLORIDE 2 MG: 4 TABLET ORAL at 18:23

## 2025-08-17 RX ADMIN — CYPROHEPTADINE HYDROCHLORIDE 2 MG: 4 TABLET ORAL at 16:30

## 2025-08-17 RX ADMIN — CEFEPIME 2000 MG: 2 INJECTION, POWDER, FOR SOLUTION INTRAVENOUS at 20:10

## 2025-08-17 RX ADMIN — HEPARIN SODIUM 5000 UNITS: 5000 INJECTION INTRAVENOUS; SUBCUTANEOUS at 05:36

## 2025-08-17 RX ADMIN — IPRATROPIUM BROMIDE AND ALBUTEROL SULFATE 3 ML: .5; 3 SOLUTION RESPIRATORY (INHALATION) at 19:11

## 2025-08-17 RX ADMIN — CYPROHEPTADINE HYDROCHLORIDE 2 MG: 4 TABLET ORAL at 22:02

## 2025-08-17 RX ADMIN — PANTOPRAZOLE SODIUM 40 MG: 40 INJECTION, POWDER, FOR SOLUTION INTRAVENOUS at 05:34

## 2025-08-17 RX ADMIN — MIDAZOLAM HYDROCHLORIDE 2 MG: 1 INJECTION, SOLUTION INTRAMUSCULAR; INTRAVENOUS at 12:09

## 2025-08-17 RX ADMIN — PROPOFOL 20 MCG/KG/MIN: 10 INJECTION, EMULSION INTRAVENOUS at 02:11

## 2025-08-17 RX ADMIN — LACTULOSE SOLUTION USP, 10 G/15 ML 20 G: 10 SOLUTION ORAL; RECTAL at 18:23

## 2025-08-17 RX ADMIN — CYPROHEPTADINE HYDROCHLORIDE 2 MG: 4 TABLET ORAL at 20:09

## 2025-08-17 RX ADMIN — LACTULOSE SOLUTION USP, 10 G/15 ML 20 G: 10 SOLUTION ORAL; RECTAL at 20:09

## 2025-08-17 RX ADMIN — DOXYCYCLINE 100 MG: 100 INJECTION, POWDER, LYOPHILIZED, FOR SOLUTION INTRAVENOUS at 00:41

## 2025-08-17 RX ADMIN — CYPROHEPTADINE HYDROCHLORIDE 2 MG: 4 TABLET ORAL at 14:26

## 2025-08-17 RX ADMIN — DOXYCYCLINE 100 MG: 100 INJECTION, POWDER, LYOPHILIZED, FOR SOLUTION INTRAVENOUS at 12:09

## 2025-08-17 RX ADMIN — HEPARIN SODIUM 5000 UNITS: 5000 INJECTION INTRAVENOUS; SUBCUTANEOUS at 14:26

## 2025-08-17 RX ADMIN — MUPIROCIN 1 APPLICATION: 20 OINTMENT TOPICAL at 20:10

## 2025-08-17 RX ADMIN — MUPIROCIN 1 APPLICATION: 20 OINTMENT TOPICAL at 10:16

## 2025-08-17 RX ADMIN — IPRATROPIUM BROMIDE AND ALBUTEROL SULFATE 3 ML: .5; 3 SOLUTION RESPIRATORY (INHALATION) at 17:09

## 2025-08-17 RX ADMIN — IPRATROPIUM BROMIDE AND ALBUTEROL SULFATE 3 ML: .5; 3 SOLUTION RESPIRATORY (INHALATION) at 12:21

## 2025-08-17 RX ADMIN — HEPARIN SODIUM 5000 UNITS: 5000 INJECTION INTRAVENOUS; SUBCUTANEOUS at 22:02

## 2025-08-17 RX ADMIN — PROPOFOL 30 MCG/KG/MIN: 10 INJECTION, EMULSION INTRAVENOUS at 16:51

## 2025-08-17 RX ADMIN — CEFEPIME 2000 MG: 2 INJECTION, POWDER, FOR SOLUTION INTRAVENOUS at 11:40

## 2025-08-18 ENCOUNTER — APPOINTMENT (OUTPATIENT)
Dept: GENERAL RADIOLOGY | Facility: HOSPITAL | Age: 60
DRG: 917 | End: 2025-08-18
Payer: MEDICAID

## 2025-08-18 LAB
ARTERIAL PATENCY WRIST A: ABNORMAL
ATMOSPHERIC PRESS: ABNORMAL MM[HG]
BACTERIA SPEC RESP CULT: NORMAL
BASE EXCESS BLDA CALC-SCNC: 0.2 MMOL/L (ref 0–2)
BDY SITE: ABNORMAL
BODY TEMPERATURE: 37
CO2 BLDA-SCNC: 25.2 MMOL/L (ref 22–33)
COHGB MFR BLD: 0.9 % (ref 0–2)
EPAP: 0
GLUCOSE BLDC GLUCOMTR-MCNC: 105 MG/DL (ref 70–130)
GLUCOSE BLDC GLUCOMTR-MCNC: 110 MG/DL (ref 70–130)
GLUCOSE BLDC GLUCOMTR-MCNC: 138 MG/DL (ref 70–130)
GLUCOSE BLDC GLUCOMTR-MCNC: 94 MG/DL (ref 70–130)
GRAM STN SPEC: NORMAL
HCO3 BLDA-SCNC: 24.1 MMOL/L (ref 20–26)
HCT VFR BLD CALC: 30.8 % (ref 38–51)
HGB BLDA-MCNC: 10.1 G/DL (ref 14–18)
INHALED O2 CONCENTRATION: 50 %
IPAP: 0
METHGB BLD QL: 0.7 % (ref 0–1.5)
MODALITY: ABNORMAL
OXYHGB MFR BLDV: 96.4 % (ref 94–99)
PAW @ PEAK INSP FLOW SETTING VENT: 0 CMH2O
PCO2 BLDA: 35.6 MM HG (ref 35–45)
PCO2 TEMP ADJ BLD: 35.6 MM HG (ref 35–45)
PEEP RESPIRATORY: 8 CM[H2O]
PH BLDA: 7.44 PH UNITS (ref 7.35–7.45)
PH, TEMP CORRECTED: 7.44 PH UNITS
PO2 BLDA: 102 MM HG (ref 83–108)
PO2 TEMP ADJ BLD: 102 MM HG (ref 83–108)
QT INTERVAL: 376 MS
QT INTERVAL: 392 MS
QT INTERVAL: 456 MS
QT INTERVAL: 474 MS
QT INTERVAL: 508 MS
QT INTERVAL: 514 MS
QTC INTERVAL: 444 MS
QTC INTERVAL: 466 MS
QTC INTERVAL: 579 MS
QTC INTERVAL: 582 MS
QTC INTERVAL: 618 MS
QTC INTERVAL: 625 MS
TOTAL RATE: 16 BREATHS/MINUTE
VENTILATOR MODE: ABNORMAL
VT ON VENT VENT: 0.42 ML

## 2025-08-18 PROCEDURE — 93010 ELECTROCARDIOGRAM REPORT: CPT | Performed by: INTERNAL MEDICINE

## 2025-08-18 PROCEDURE — 83050 HGB METHEMOGLOBIN QUAN: CPT

## 2025-08-18 PROCEDURE — 94003 VENT MGMT INPAT SUBQ DAY: CPT

## 2025-08-18 PROCEDURE — 94799 UNLISTED PULMONARY SVC/PX: CPT

## 2025-08-18 PROCEDURE — 99232 SBSQ HOSP IP/OBS MODERATE 35: CPT | Performed by: PSYCHIATRY & NEUROLOGY

## 2025-08-18 PROCEDURE — 74018 RADEX ABDOMEN 1 VIEW: CPT

## 2025-08-18 PROCEDURE — 25010000002 CEFEPIME PER 500 MG: Performed by: INTERNAL MEDICINE

## 2025-08-18 PROCEDURE — 82375 ASSAY CARBOXYHB QUANT: CPT

## 2025-08-18 PROCEDURE — 93005 ELECTROCARDIOGRAM TRACING: CPT | Performed by: STUDENT IN AN ORGANIZED HEALTH CARE EDUCATION/TRAINING PROGRAM

## 2025-08-18 PROCEDURE — 25010000002 DOXYCYCLINE 100 MG RECONSTITUTED SOLUTION 1 EACH VIAL

## 2025-08-18 PROCEDURE — 99291 CRITICAL CARE FIRST HOUR: CPT | Performed by: STUDENT IN AN ORGANIZED HEALTH CARE EDUCATION/TRAINING PROGRAM

## 2025-08-18 PROCEDURE — 71045 X-RAY EXAM CHEST 1 VIEW: CPT

## 2025-08-18 PROCEDURE — 94761 N-INVAS EAR/PLS OXIMETRY MLT: CPT

## 2025-08-18 PROCEDURE — 82948 REAGENT STRIP/BLOOD GLUCOSE: CPT

## 2025-08-18 PROCEDURE — 25010000002 HEPARIN (PORCINE) PER 1000 UNITS: Performed by: INTERNAL MEDICINE

## 2025-08-18 PROCEDURE — 82805 BLOOD GASES W/O2 SATURATION: CPT

## 2025-08-18 PROCEDURE — 25010000002 PROPOFOL 10 MG/ML EMULSION

## 2025-08-18 RX ORDER — CYPROHEPTADINE HYDROCHLORIDE 4 MG/1
4 TABLET ORAL EVERY 4 HOURS
Status: DISCONTINUED | OUTPATIENT
Start: 2025-08-18 | End: 2025-08-19

## 2025-08-18 RX ORDER — CYPROHEPTADINE HYDROCHLORIDE 4 MG/1
2 TABLET ORAL
Status: DISCONTINUED | OUTPATIENT
Start: 2025-08-18 | End: 2025-08-18

## 2025-08-18 RX ORDER — LACTULOSE 10 G/15ML
20 SOLUTION ORAL 4 TIMES DAILY
Status: DISCONTINUED | OUTPATIENT
Start: 2025-08-18 | End: 2025-08-21

## 2025-08-18 RX ORDER — LACTULOSE 10 G/15ML
300 SOLUTION ORAL ONCE
Status: COMPLETED | OUTPATIENT
Start: 2025-08-18 | End: 2025-08-18

## 2025-08-18 RX ADMIN — IPRATROPIUM BROMIDE AND ALBUTEROL SULFATE 3 ML: .5; 3 SOLUTION RESPIRATORY (INHALATION) at 16:33

## 2025-08-18 RX ADMIN — CYPROHEPTADINE HYDROCHLORIDE 4 MG: 4 TABLET ORAL at 12:44

## 2025-08-18 RX ADMIN — DOXYCYCLINE 100 MG: 100 INJECTION, POWDER, LYOPHILIZED, FOR SOLUTION INTRAVENOUS at 00:14

## 2025-08-18 RX ADMIN — LACTULOSE 300 ML: 10 SOLUTION ORAL at 16:43

## 2025-08-18 RX ADMIN — LACTULOSE SOLUTION USP, 10 G/15 ML 20 G: 10 SOLUTION ORAL; RECTAL at 08:46

## 2025-08-18 RX ADMIN — CEFEPIME 2000 MG: 2 INJECTION, POWDER, FOR SOLUTION INTRAVENOUS at 04:11

## 2025-08-18 RX ADMIN — MUPIROCIN 1 APPLICATION: 20 OINTMENT TOPICAL at 08:46

## 2025-08-18 RX ADMIN — MUPIROCIN 1 APPLICATION: 20 OINTMENT TOPICAL at 20:09

## 2025-08-18 RX ADMIN — HEPARIN SODIUM 5000 UNITS: 5000 INJECTION INTRAVENOUS; SUBCUTANEOUS at 22:19

## 2025-08-18 RX ADMIN — HEPARIN SODIUM 5000 UNITS: 5000 INJECTION INTRAVENOUS; SUBCUTANEOUS at 05:30

## 2025-08-18 RX ADMIN — CEFEPIME 2000 MG: 2 INJECTION, POWDER, FOR SOLUTION INTRAVENOUS at 20:08

## 2025-08-18 RX ADMIN — CYPROHEPTADINE HYDROCHLORIDE 2 MG: 4 TABLET ORAL at 10:24

## 2025-08-18 RX ADMIN — CYPROHEPTADINE HYDROCHLORIDE 4 MG: 4 TABLET ORAL at 20:09

## 2025-08-18 RX ADMIN — PROPOFOL 40 MCG/KG/MIN: 10 INJECTION, EMULSION INTRAVENOUS at 04:24

## 2025-08-18 RX ADMIN — CYPROHEPTADINE HYDROCHLORIDE 2 MG: 4 TABLET ORAL at 04:11

## 2025-08-18 RX ADMIN — IPRATROPIUM BROMIDE AND ALBUTEROL SULFATE 3 ML: .5; 3 SOLUTION RESPIRATORY (INHALATION) at 12:22

## 2025-08-18 RX ADMIN — PROPOFOL 50 MCG/KG/MIN: 10 INJECTION, EMULSION INTRAVENOUS at 19:21

## 2025-08-18 RX ADMIN — PANTOPRAZOLE SODIUM 40 MG: 40 INJECTION, POWDER, FOR SOLUTION INTRAVENOUS at 05:32

## 2025-08-18 RX ADMIN — PROPOFOL 40 MCG/KG/MIN: 10 INJECTION, EMULSION INTRAVENOUS at 12:44

## 2025-08-18 RX ADMIN — IPRATROPIUM BROMIDE AND ALBUTEROL SULFATE 3 ML: .5; 3 SOLUTION RESPIRATORY (INHALATION) at 19:07

## 2025-08-18 RX ADMIN — NOREPINEPHRINE BITARTRATE 0.04 MCG/KG/MIN: 32 SOLUTION INTRAVENOUS at 10:26

## 2025-08-18 RX ADMIN — CYPROHEPTADINE HYDROCHLORIDE 4 MG: 4 TABLET ORAL at 18:32

## 2025-08-18 RX ADMIN — PROPOFOL 50 MCG/KG/MIN: 10 INJECTION, EMULSION INTRAVENOUS at 21:44

## 2025-08-18 RX ADMIN — CEFEPIME 2000 MG: 2 INJECTION, POWDER, FOR SOLUTION INTRAVENOUS at 12:44

## 2025-08-18 RX ADMIN — Medication 10 ML: at 20:09

## 2025-08-18 RX ADMIN — CYPROHEPTADINE HYDROCHLORIDE 2 MG: 4 TABLET ORAL at 02:12

## 2025-08-18 RX ADMIN — CYPROHEPTADINE HYDROCHLORIDE 2 MG: 4 TABLET ORAL at 08:46

## 2025-08-18 RX ADMIN — NOREPINEPHRINE BITARTRATE 0.04 MCG/KG/MIN: 32 SOLUTION INTRAVENOUS at 10:24

## 2025-08-18 RX ADMIN — IPRATROPIUM BROMIDE AND ALBUTEROL SULFATE 3 ML: .5; 3 SOLUTION RESPIRATORY (INHALATION) at 07:12

## 2025-08-18 RX ADMIN — LACTULOSE SOLUTION USP, 10 G/15 ML 20 G: 10 SOLUTION ORAL; RECTAL at 20:08

## 2025-08-18 RX ADMIN — CYPROHEPTADINE HYDROCHLORIDE 2 MG: 4 TABLET ORAL at 05:34

## 2025-08-18 RX ADMIN — LEVOTHYROXINE SODIUM 100 MCG: 0.1 TABLET ORAL at 05:34

## 2025-08-18 RX ADMIN — LACTULOSE SOLUTION USP, 10 G/15 ML 20 G: 10 SOLUTION ORAL; RECTAL at 18:32

## 2025-08-18 RX ADMIN — DOXYCYCLINE 100 MG: 100 INJECTION, POWDER, LYOPHILIZED, FOR SOLUTION INTRAVENOUS at 12:44

## 2025-08-18 RX ADMIN — LACTULOSE SOLUTION USP, 10 G/15 ML 20 G: 10 SOLUTION ORAL; RECTAL at 12:00

## 2025-08-18 RX ADMIN — SENNOSIDES, DOCUSATE SODIUM 2 TABLET: 50; 8.6 TABLET, FILM COATED ORAL at 20:08

## 2025-08-18 RX ADMIN — NOREPINEPHRINE BITARTRATE 0.04 MCG/KG/MIN: 32 SOLUTION INTRAVENOUS at 08:46

## 2025-08-18 RX ADMIN — HEPARIN SODIUM 5000 UNITS: 5000 INJECTION INTRAVENOUS; SUBCUTANEOUS at 15:23

## 2025-08-18 RX ADMIN — DEXTROSE MONOHYDRATE 30 ML/HR: 100 INJECTION, SOLUTION INTRAVENOUS at 05:29

## 2025-08-18 RX ADMIN — CYPROHEPTADINE HYDROCHLORIDE 2 MG: 4 TABLET ORAL at 00:14

## 2025-08-18 RX ADMIN — PROPOFOL 40 MCG/KG/MIN: 10 INJECTION, EMULSION INTRAVENOUS at 08:46

## 2025-08-19 ENCOUNTER — APPOINTMENT (OUTPATIENT)
Dept: GENERAL RADIOLOGY | Facility: HOSPITAL | Age: 60
DRG: 917 | End: 2025-08-19
Payer: MEDICAID

## 2025-08-19 LAB
BACTERIA SPEC AEROBE CULT: NORMAL
BACTERIA SPEC AEROBE CULT: NORMAL
GLUCOSE BLDC GLUCOMTR-MCNC: 104 MG/DL (ref 70–130)
GLUCOSE BLDC GLUCOMTR-MCNC: 92 MG/DL (ref 70–130)
GLUCOSE BLDC GLUCOMTR-MCNC: 93 MG/DL (ref 70–130)
QT INTERVAL: 372 MS
QTC INTERVAL: 457 MS

## 2025-08-19 PROCEDURE — 94799 UNLISTED PULMONARY SVC/PX: CPT

## 2025-08-19 PROCEDURE — 82948 REAGENT STRIP/BLOOD GLUCOSE: CPT

## 2025-08-19 PROCEDURE — 71045 X-RAY EXAM CHEST 1 VIEW: CPT

## 2025-08-19 PROCEDURE — 25010000002 DOXYCYCLINE 100 MG RECONSTITUTED SOLUTION 1 EACH VIAL

## 2025-08-19 PROCEDURE — 93010 ELECTROCARDIOGRAM REPORT: CPT | Performed by: INTERNAL MEDICINE

## 2025-08-19 PROCEDURE — 93005 ELECTROCARDIOGRAM TRACING: CPT | Performed by: STUDENT IN AN ORGANIZED HEALTH CARE EDUCATION/TRAINING PROGRAM

## 2025-08-19 PROCEDURE — 25010000002 PROPOFOL 10 MG/ML EMULSION

## 2025-08-19 PROCEDURE — 94003 VENT MGMT INPAT SUBQ DAY: CPT

## 2025-08-19 PROCEDURE — 99232 SBSQ HOSP IP/OBS MODERATE 35: CPT | Performed by: PSYCHIATRY & NEUROLOGY

## 2025-08-19 PROCEDURE — 99233 SBSQ HOSP IP/OBS HIGH 50: CPT | Performed by: STUDENT IN AN ORGANIZED HEALTH CARE EDUCATION/TRAINING PROGRAM

## 2025-08-19 PROCEDURE — 25010000002 CEFEPIME PER 500 MG: Performed by: INTERNAL MEDICINE

## 2025-08-19 PROCEDURE — 74018 RADEX ABDOMEN 1 VIEW: CPT

## 2025-08-19 PROCEDURE — 94761 N-INVAS EAR/PLS OXIMETRY MLT: CPT

## 2025-08-19 PROCEDURE — 25010000002 HEPARIN (PORCINE) PER 1000 UNITS: Performed by: INTERNAL MEDICINE

## 2025-08-19 RX ORDER — CYPROHEPTADINE HYDROCHLORIDE 4 MG/1
4 TABLET ORAL EVERY 8 HOURS
Status: DISCONTINUED | OUTPATIENT
Start: 2025-08-19 | End: 2025-08-21

## 2025-08-19 RX ADMIN — DOXYCYCLINE 100 MG: 100 INJECTION, POWDER, LYOPHILIZED, FOR SOLUTION INTRAVENOUS at 23:03

## 2025-08-19 RX ADMIN — Medication 10 ML: at 08:23

## 2025-08-19 RX ADMIN — LACTULOSE SOLUTION USP, 10 G/15 ML 20 G: 10 SOLUTION ORAL; RECTAL at 20:11

## 2025-08-19 RX ADMIN — IPRATROPIUM BROMIDE AND ALBUTEROL SULFATE 3 ML: .5; 3 SOLUTION RESPIRATORY (INHALATION) at 07:30

## 2025-08-19 RX ADMIN — PROPOFOL 50 MCG/KG/MIN: 10 INJECTION, EMULSION INTRAVENOUS at 23:42

## 2025-08-19 RX ADMIN — PANTOPRAZOLE SODIUM 40 MG: 40 INJECTION, POWDER, FOR SOLUTION INTRAVENOUS at 05:31

## 2025-08-19 RX ADMIN — PROPOFOL 50 MCG/KG/MIN: 10 INJECTION, EMULSION INTRAVENOUS at 05:31

## 2025-08-19 RX ADMIN — HEPARIN SODIUM 5000 UNITS: 5000 INJECTION INTRAVENOUS; SUBCUTANEOUS at 12:37

## 2025-08-19 RX ADMIN — HEPARIN SODIUM 5000 UNITS: 5000 INJECTION INTRAVENOUS; SUBCUTANEOUS at 05:31

## 2025-08-19 RX ADMIN — PROPOFOL 40 MCG/KG/MIN: 10 INJECTION, EMULSION INTRAVENOUS at 20:10

## 2025-08-19 RX ADMIN — CEFEPIME 2000 MG: 2 INJECTION, POWDER, FOR SOLUTION INTRAVENOUS at 12:30

## 2025-08-19 RX ADMIN — Medication 10 ML: at 20:12

## 2025-08-19 RX ADMIN — LACTULOSE SOLUTION USP, 10 G/15 ML 20 G: 10 SOLUTION ORAL; RECTAL at 08:23

## 2025-08-19 RX ADMIN — MUPIROCIN 1 APPLICATION: 20 OINTMENT TOPICAL at 20:12

## 2025-08-19 RX ADMIN — PROPOFOL 50 MCG/KG/MIN: 10 INJECTION, EMULSION INTRAVENOUS at 01:47

## 2025-08-19 RX ADMIN — SENNOSIDES, DOCUSATE SODIUM 2 TABLET: 50; 8.6 TABLET, FILM COATED ORAL at 08:23

## 2025-08-19 RX ADMIN — HEPARIN SODIUM 5000 UNITS: 5000 INJECTION INTRAVENOUS; SUBCUTANEOUS at 21:39

## 2025-08-19 RX ADMIN — LACTULOSE SOLUTION USP, 10 G/15 ML 20 G: 10 SOLUTION ORAL; RECTAL at 18:20

## 2025-08-19 RX ADMIN — IPRATROPIUM BROMIDE AND ALBUTEROL SULFATE 3 ML: .5; 3 SOLUTION RESPIRATORY (INHALATION) at 16:29

## 2025-08-19 RX ADMIN — IPRATROPIUM BROMIDE AND ALBUTEROL SULFATE 3 ML: .5; 3 SOLUTION RESPIRATORY (INHALATION) at 12:50

## 2025-08-19 RX ADMIN — SENNOSIDES, DOCUSATE SODIUM 2 TABLET: 50; 8.6 TABLET, FILM COATED ORAL at 20:11

## 2025-08-19 RX ADMIN — DOXYCYCLINE 100 MG: 100 INJECTION, POWDER, LYOPHILIZED, FOR SOLUTION INTRAVENOUS at 12:37

## 2025-08-19 RX ADMIN — LACTULOSE SOLUTION USP, 10 G/15 ML 20 G: 10 SOLUTION ORAL; RECTAL at 12:30

## 2025-08-19 RX ADMIN — PROPOFOL 40 MCG/KG/MIN: 10 INJECTION, EMULSION INTRAVENOUS at 12:05

## 2025-08-19 RX ADMIN — POLYETHYLENE GLYCOL 3350 17 G: 17 POWDER, FOR SOLUTION ORAL at 12:37

## 2025-08-19 RX ADMIN — IPRATROPIUM BROMIDE AND ALBUTEROL SULFATE 3 ML: .5; 3 SOLUTION RESPIRATORY (INHALATION) at 19:42

## 2025-08-19 RX ADMIN — CYPROHEPTADINE HYDROCHLORIDE 4 MG: 4 TABLET ORAL at 05:31

## 2025-08-19 RX ADMIN — LEVOTHYROXINE SODIUM 100 MCG: 0.1 TABLET ORAL at 05:31

## 2025-08-19 RX ADMIN — DEXTROSE MONOHYDRATE 30 ML/HR: 100 INJECTION, SOLUTION INTRAVENOUS at 12:30

## 2025-08-19 RX ADMIN — CYPROHEPTADINE HYDROCHLORIDE 4 MG: 4 TABLET ORAL at 20:11

## 2025-08-19 RX ADMIN — CYPROHEPTADINE HYDROCHLORIDE 4 MG: 4 TABLET ORAL at 00:16

## 2025-08-19 RX ADMIN — MUPIROCIN 1 APPLICATION: 20 OINTMENT TOPICAL at 08:23

## 2025-08-19 RX ADMIN — CYPROHEPTADINE HYDROCHLORIDE 4 MG: 4 TABLET ORAL at 08:23

## 2025-08-19 RX ADMIN — DOXYCYCLINE 100 MG: 100 INJECTION, POWDER, LYOPHILIZED, FOR SOLUTION INTRAVENOUS at 00:16

## 2025-08-19 RX ADMIN — CEFEPIME 2000 MG: 2 INJECTION, POWDER, FOR SOLUTION INTRAVENOUS at 20:10

## 2025-08-19 RX ADMIN — CEFEPIME 2000 MG: 2 INJECTION, POWDER, FOR SOLUTION INTRAVENOUS at 03:56

## 2025-08-19 RX ADMIN — CYPROHEPTADINE HYDROCHLORIDE 4 MG: 4 TABLET ORAL at 12:30

## 2025-08-20 LAB
ALBUMIN SERPL-MCNC: 3.1 G/DL (ref 3.5–5.2)
ALBUMIN/GLOB SERPL: 1.1 G/DL
ALP SERPL-CCNC: 109 U/L (ref 39–117)
ALT SERPL W P-5'-P-CCNC: 21 U/L (ref 1–33)
AMMONIA BLD-SCNC: 43 UMOL/L (ref 11–51)
ANION GAP SERPL CALCULATED.3IONS-SCNC: 9.4 MMOL/L (ref 5–15)
AST SERPL-CCNC: 81 U/L (ref 1–32)
BILIRUB SERPL-MCNC: 0.3 MG/DL (ref 0–1.2)
BUN SERPL-MCNC: 9.5 MG/DL (ref 8–23)
BUN/CREAT SERPL: 14.2 (ref 7–25)
CALCIUM SPEC-SCNC: 8.6 MG/DL (ref 8.6–10.5)
CHLORIDE SERPL-SCNC: 112 MMOL/L (ref 98–107)
CK BB CFR SERPL ELPH: 0 %
CK MACRO1 CFR SERPL: 0 %
CK MACRO2 CFR SERPL: 0 %
CK MB CFR SERPL ELPH: 0 % (ref 0–3)
CK MM CFR SERPL ELPH: 100 % (ref 97–100)
CK SERPL-CCNC: 495 U/L (ref 32–182)
CO2 SERPL-SCNC: 21.6 MMOL/L (ref 22–29)
CREAT SERPL-MCNC: 0.67 MG/DL (ref 0.57–1)
DEPRECATED RDW RBC AUTO: 50.2 FL (ref 37–54)
EGFRCR SERPLBLD CKD-EPI 2021: 99.6 ML/MIN/1.73
ERYTHROCYTE [DISTWIDTH] IN BLOOD BY AUTOMATED COUNT: 14.6 % (ref 12.3–15.4)
GLOBULIN UR ELPH-MCNC: 2.8 GM/DL
GLUCOSE BLDC GLUCOMTR-MCNC: 102 MG/DL (ref 70–130)
GLUCOSE BLDC GLUCOMTR-MCNC: 106 MG/DL (ref 70–130)
GLUCOSE BLDC GLUCOMTR-MCNC: 98 MG/DL (ref 70–130)
GLUCOSE SERPL-MCNC: 99 MG/DL (ref 65–99)
HCT VFR BLD AUTO: 27.6 % (ref 34–46.6)
HGB BLD-MCNC: 8.9 G/DL (ref 12–15.9)
Lab: NORMAL
MAGNESIUM SERPL-MCNC: 2.1 MG/DL (ref 1.6–2.4)
MCH RBC QN AUTO: 30.2 PG (ref 26.6–33)
MCHC RBC AUTO-ENTMCNC: 32.2 G/DL (ref 31.5–35.7)
MCV RBC AUTO: 93.6 FL (ref 79–97)
PHOSPHATE SERPL-MCNC: 2.2 MG/DL (ref 2.5–4.5)
PLATELET # BLD AUTO: 136 10*3/MM3 (ref 140–450)
PMV BLD AUTO: 10.4 FL (ref 6–12)
POTASSIUM SERPL-SCNC: 2.8 MMOL/L (ref 3.5–5.2)
PROT SERPL-MCNC: 5.9 G/DL (ref 6–8.5)
RBC # BLD AUTO: 2.95 10*6/MM3 (ref 3.77–5.28)
SODIUM SERPL-SCNC: 143 MMOL/L (ref 136–145)
WBC NRBC COR # BLD AUTO: 9.4 10*3/MM3 (ref 3.4–10.8)

## 2025-08-20 PROCEDURE — 99233 SBSQ HOSP IP/OBS HIGH 50: CPT | Performed by: STUDENT IN AN ORGANIZED HEALTH CARE EDUCATION/TRAINING PROGRAM

## 2025-08-20 PROCEDURE — 25010000002 CEFEPIME PER 500 MG: Performed by: INTERNAL MEDICINE

## 2025-08-20 PROCEDURE — 94799 UNLISTED PULMONARY SVC/PX: CPT

## 2025-08-20 PROCEDURE — 25010000002 PROPOFOL 10 MG/ML EMULSION

## 2025-08-20 PROCEDURE — 85027 COMPLETE CBC AUTOMATED: CPT | Performed by: STUDENT IN AN ORGANIZED HEALTH CARE EDUCATION/TRAINING PROGRAM

## 2025-08-20 PROCEDURE — 99232 SBSQ HOSP IP/OBS MODERATE 35: CPT | Performed by: PSYCHIATRY & NEUROLOGY

## 2025-08-20 PROCEDURE — 94003 VENT MGMT INPAT SUBQ DAY: CPT

## 2025-08-20 PROCEDURE — 83735 ASSAY OF MAGNESIUM: CPT | Performed by: STUDENT IN AN ORGANIZED HEALTH CARE EDUCATION/TRAINING PROGRAM

## 2025-08-20 PROCEDURE — 82948 REAGENT STRIP/BLOOD GLUCOSE: CPT

## 2025-08-20 PROCEDURE — 82140 ASSAY OF AMMONIA: CPT | Performed by: STUDENT IN AN ORGANIZED HEALTH CARE EDUCATION/TRAINING PROGRAM

## 2025-08-20 PROCEDURE — 25010000002 HEPARIN (PORCINE) PER 1000 UNITS: Performed by: INTERNAL MEDICINE

## 2025-08-20 PROCEDURE — 94660 CPAP INITIATION&MGMT: CPT

## 2025-08-20 PROCEDURE — 80053 COMPREHEN METABOLIC PANEL: CPT | Performed by: STUDENT IN AN ORGANIZED HEALTH CARE EDUCATION/TRAINING PROGRAM

## 2025-08-20 PROCEDURE — 92610 EVALUATE SWALLOWING FUNCTION: CPT

## 2025-08-20 PROCEDURE — 25010000002 DOXYCYCLINE 100 MG RECONSTITUTED SOLUTION 1 EACH VIAL

## 2025-08-20 PROCEDURE — 25010000002 POTASSIUM CHLORIDE PER 2 MEQ

## 2025-08-20 PROCEDURE — 94761 N-INVAS EAR/PLS OXIMETRY MLT: CPT

## 2025-08-20 PROCEDURE — 84100 ASSAY OF PHOSPHORUS: CPT | Performed by: STUDENT IN AN ORGANIZED HEALTH CARE EDUCATION/TRAINING PROGRAM

## 2025-08-20 PROCEDURE — 84132 ASSAY OF SERUM POTASSIUM: CPT | Performed by: STUDENT IN AN ORGANIZED HEALTH CARE EDUCATION/TRAINING PROGRAM

## 2025-08-20 RX ORDER — POTASSIUM CHLORIDE 29.8 MG/ML
20 INJECTION INTRAVENOUS ONCE
Status: COMPLETED | OUTPATIENT
Start: 2025-08-20 | End: 2025-08-20

## 2025-08-20 RX ORDER — DEXMEDETOMIDINE HYDROCHLORIDE 4 UG/ML
.2-1.5 INJECTION, SOLUTION INTRAVENOUS
Status: DISCONTINUED | OUTPATIENT
Start: 2025-08-20 | End: 2025-08-22

## 2025-08-20 RX ORDER — ACETAMINOPHEN 160 MG/5ML
650 SOLUTION ORAL EVERY 6 HOURS PRN
Status: DISCONTINUED | OUTPATIENT
Start: 2025-08-20 | End: 2025-08-25 | Stop reason: HOSPADM

## 2025-08-20 RX ORDER — POTASSIUM CHLORIDE 1.5 G/1.58G
40 POWDER, FOR SOLUTION ORAL EVERY 4 HOURS
Status: DISPENSED | OUTPATIENT
Start: 2025-08-20 | End: 2025-08-21

## 2025-08-20 RX ADMIN — LEVOTHYROXINE SODIUM 100 MCG: 0.1 TABLET ORAL at 05:17

## 2025-08-20 RX ADMIN — IPRATROPIUM BROMIDE AND ALBUTEROL SULFATE 3 ML: .5; 3 SOLUTION RESPIRATORY (INHALATION) at 16:29

## 2025-08-20 RX ADMIN — HEPARIN SODIUM 5000 UNITS: 5000 INJECTION INTRAVENOUS; SUBCUTANEOUS at 23:51

## 2025-08-20 RX ADMIN — PROPOFOL 45 MCG/KG/MIN: 10 INJECTION, EMULSION INTRAVENOUS at 05:16

## 2025-08-20 RX ADMIN — POTASSIUM CHLORIDE 40 MEQ: 1.5 POWDER, FOR SOLUTION ORAL at 11:28

## 2025-08-20 RX ADMIN — Medication 2 PACKET: at 11:27

## 2025-08-20 RX ADMIN — ACETAMINOPHEN 650 MG: 650 SOLUTION ORAL at 14:04

## 2025-08-20 RX ADMIN — CEFEPIME 2000 MG: 2 INJECTION, POWDER, FOR SOLUTION INTRAVENOUS at 20:33

## 2025-08-20 RX ADMIN — IPRATROPIUM BROMIDE AND ALBUTEROL SULFATE 3 ML: .5; 3 SOLUTION RESPIRATORY (INHALATION) at 11:39

## 2025-08-20 RX ADMIN — DEXMEDETOMIDINE HYDROCHLORIDE 0.2 MCG/KG/HR: 4 INJECTION, SOLUTION INTRAVENOUS at 09:39

## 2025-08-20 RX ADMIN — POTASSIUM CHLORIDE 20 MEQ: 29.8 INJECTION, SOLUTION INTRAVENOUS at 16:31

## 2025-08-20 RX ADMIN — MUPIROCIN 1 APPLICATION: 20 OINTMENT TOPICAL at 09:42

## 2025-08-20 RX ADMIN — Medication 10 ML: at 20:31

## 2025-08-20 RX ADMIN — IPRATROPIUM BROMIDE AND ALBUTEROL SULFATE 3 ML: .5; 3 SOLUTION RESPIRATORY (INHALATION) at 20:43

## 2025-08-20 RX ADMIN — CYPROHEPTADINE HYDROCHLORIDE 4 MG: 4 TABLET ORAL at 14:04

## 2025-08-20 RX ADMIN — DOXYCYCLINE 100 MG: 100 INJECTION, POWDER, LYOPHILIZED, FOR SOLUTION INTRAVENOUS at 11:28

## 2025-08-20 RX ADMIN — PROPOFOL 15 MCG/KG/MIN: 10 INJECTION, EMULSION INTRAVENOUS at 09:30

## 2025-08-20 RX ADMIN — HEPARIN SODIUM 5000 UNITS: 5000 INJECTION INTRAVENOUS; SUBCUTANEOUS at 14:04

## 2025-08-20 RX ADMIN — DEXMEDETOMIDINE HYDROCHLORIDE 0.4 MCG/KG/HR: 4 INJECTION, SOLUTION INTRAVENOUS at 20:30

## 2025-08-20 RX ADMIN — CYPROHEPTADINE HYDROCHLORIDE 4 MG: 4 TABLET ORAL at 04:00

## 2025-08-20 RX ADMIN — IPRATROPIUM BROMIDE AND ALBUTEROL SULFATE 3 ML: .5; 3 SOLUTION RESPIRATORY (INHALATION) at 07:29

## 2025-08-20 RX ADMIN — HEPARIN SODIUM 5000 UNITS: 5000 INJECTION INTRAVENOUS; SUBCUTANEOUS at 05:17

## 2025-08-20 RX ADMIN — PANTOPRAZOLE SODIUM 40 MG: 40 INJECTION, POWDER, FOR SOLUTION INTRAVENOUS at 05:17

## 2025-08-20 RX ADMIN — CEFEPIME 2000 MG: 2 INJECTION, POWDER, FOR SOLUTION INTRAVENOUS at 03:58

## 2025-08-20 RX ADMIN — DEXTROSE MONOHYDRATE 30 ML/HR: 100 INJECTION, SOLUTION INTRAVENOUS at 20:30

## 2025-08-20 RX ADMIN — CEFEPIME 2000 MG: 2 INJECTION, POWDER, FOR SOLUTION INTRAVENOUS at 11:28

## 2025-08-21 LAB
ALBUMIN SERPL-MCNC: 2.8 G/DL (ref 3.5–5.2)
ALBUMIN/GLOB SERPL: 1 G/DL
ALP SERPL-CCNC: 83 U/L (ref 39–117)
ALT SERPL W P-5'-P-CCNC: 21 U/L (ref 1–33)
ANION GAP SERPL CALCULATED.3IONS-SCNC: 9.7 MMOL/L (ref 5–15)
AST SERPL-CCNC: 76 U/L (ref 1–32)
BILIRUB CONJ SERPL-MCNC: 0.2 MG/DL (ref 0–0.3)
BILIRUB SERPL-MCNC: 0.4 MG/DL (ref 0–1.2)
BUN SERPL-MCNC: 10.4 MG/DL (ref 8–23)
BUN/CREAT SERPL: 17.9 (ref 7–25)
CALCIUM SPEC-SCNC: 8.5 MG/DL (ref 8.6–10.5)
CHLORIDE SERPL-SCNC: 114 MMOL/L (ref 98–107)
CO2 SERPL-SCNC: 18.3 MMOL/L (ref 22–29)
CREAT SERPL-MCNC: 0.58 MG/DL (ref 0.57–1)
EGFRCR SERPLBLD CKD-EPI 2021: 103.1 ML/MIN/1.73
GLOBULIN UR ELPH-MCNC: 2.8 GM/DL
GLUCOSE BLDC GLUCOMTR-MCNC: 117 MG/DL (ref 70–130)
GLUCOSE BLDC GLUCOMTR-MCNC: 84 MG/DL (ref 70–130)
GLUCOSE SERPL-MCNC: 106 MG/DL (ref 65–99)
Lab: NORMAL
Lab: NORMAL
PHOSPHATE SERPL-MCNC: 2.3 MG/DL (ref 2.5–4.5)
POTASSIUM SERPL-SCNC: 3.3 MMOL/L (ref 3.5–5.2)
POTASSIUM SERPL-SCNC: 3.4 MMOL/L (ref 3.5–5.2)
POTASSIUM SERPL-SCNC: 4.2 MMOL/L (ref 3.5–5.2)
PROT SERPL-MCNC: 5.6 G/DL (ref 6–8.5)
SODIUM SERPL-SCNC: 142 MMOL/L (ref 136–145)

## 2025-08-21 PROCEDURE — 82248 BILIRUBIN DIRECT: CPT | Performed by: STUDENT IN AN ORGANIZED HEALTH CARE EDUCATION/TRAINING PROGRAM

## 2025-08-21 PROCEDURE — 94799 UNLISTED PULMONARY SVC/PX: CPT

## 2025-08-21 PROCEDURE — 25010000002 POTASSIUM CHLORIDE PER 2 MEQ: Performed by: STUDENT IN AN ORGANIZED HEALTH CARE EDUCATION/TRAINING PROGRAM

## 2025-08-21 PROCEDURE — 94660 CPAP INITIATION&MGMT: CPT

## 2025-08-21 PROCEDURE — 84132 ASSAY OF SERUM POTASSIUM: CPT | Performed by: STUDENT IN AN ORGANIZED HEALTH CARE EDUCATION/TRAINING PROGRAM

## 2025-08-21 PROCEDURE — 25010000002 HEPARIN (PORCINE) PER 1000 UNITS: Performed by: INTERNAL MEDICINE

## 2025-08-21 PROCEDURE — 92610 EVALUATE SWALLOWING FUNCTION: CPT

## 2025-08-21 PROCEDURE — 99232 SBSQ HOSP IP/OBS MODERATE 35: CPT | Performed by: PSYCHIATRY & NEUROLOGY

## 2025-08-21 PROCEDURE — 80053 COMPREHEN METABOLIC PANEL: CPT | Performed by: STUDENT IN AN ORGANIZED HEALTH CARE EDUCATION/TRAINING PROGRAM

## 2025-08-21 PROCEDURE — 25010000002 CEFEPIME PER 500 MG: Performed by: INTERNAL MEDICINE

## 2025-08-21 PROCEDURE — 99232 SBSQ HOSP IP/OBS MODERATE 35: CPT | Performed by: STUDENT IN AN ORGANIZED HEALTH CARE EDUCATION/TRAINING PROGRAM

## 2025-08-21 PROCEDURE — 92523 SPEECH SOUND LANG COMPREHEN: CPT

## 2025-08-21 PROCEDURE — 94761 N-INVAS EAR/PLS OXIMETRY MLT: CPT

## 2025-08-21 PROCEDURE — 82948 REAGENT STRIP/BLOOD GLUCOSE: CPT

## 2025-08-21 RX ORDER — LEVOTHYROXINE SODIUM 100 UG/1
100 TABLET ORAL
Status: DISCONTINUED | OUTPATIENT
Start: 2025-08-22 | End: 2025-08-25 | Stop reason: HOSPADM

## 2025-08-21 RX ORDER — GABAPENTIN 100 MG/1
200 CAPSULE ORAL EVERY 8 HOURS SCHEDULED
Status: DISCONTINUED | OUTPATIENT
Start: 2025-08-21 | End: 2025-08-25 | Stop reason: HOSPADM

## 2025-08-21 RX ORDER — AMOXICILLIN 250 MG
2 CAPSULE ORAL 2 TIMES DAILY
Status: DISCONTINUED | OUTPATIENT
Start: 2025-08-21 | End: 2025-08-25 | Stop reason: HOSPADM

## 2025-08-21 RX ORDER — POLYETHYLENE GLYCOL 3350 17 G/17G
17 POWDER, FOR SOLUTION ORAL DAILY PRN
Status: DISCONTINUED | OUTPATIENT
Start: 2025-08-21 | End: 2025-08-25 | Stop reason: HOSPADM

## 2025-08-21 RX ORDER — LACTULOSE 10 G/15ML
20 SOLUTION ORAL 4 TIMES DAILY
Status: DISCONTINUED | OUTPATIENT
Start: 2025-08-21 | End: 2025-08-25 | Stop reason: HOSPADM

## 2025-08-21 RX ORDER — CYPROHEPTADINE HYDROCHLORIDE 4 MG/1
4 TABLET ORAL EVERY 8 HOURS
Status: DISCONTINUED | OUTPATIENT
Start: 2025-08-21 | End: 2025-08-21

## 2025-08-21 RX ORDER — POTASSIUM CHLORIDE 29.8 MG/ML
20 INJECTION INTRAVENOUS
Status: COMPLETED | OUTPATIENT
Start: 2025-08-21 | End: 2025-08-21

## 2025-08-21 RX ORDER — ATORVASTATIN CALCIUM 40 MG/1
40 TABLET, FILM COATED ORAL NIGHTLY
Status: DISCONTINUED | OUTPATIENT
Start: 2025-08-21 | End: 2025-08-25 | Stop reason: HOSPADM

## 2025-08-21 RX ORDER — CYPROHEPTADINE HYDROCHLORIDE 4 MG/1
2 TABLET ORAL EVERY 8 HOURS
Status: DISCONTINUED | OUTPATIENT
Start: 2025-08-21 | End: 2025-08-22

## 2025-08-21 RX ADMIN — IPRATROPIUM BROMIDE AND ALBUTEROL SULFATE 3 ML: .5; 3 SOLUTION RESPIRATORY (INHALATION) at 19:13

## 2025-08-21 RX ADMIN — CEFEPIME 2000 MG: 2 INJECTION, POWDER, FOR SOLUTION INTRAVENOUS at 04:08

## 2025-08-21 RX ADMIN — HEPARIN SODIUM 5000 UNITS: 5000 INJECTION INTRAVENOUS; SUBCUTANEOUS at 13:04

## 2025-08-21 RX ADMIN — HEPARIN SODIUM 5000 UNITS: 5000 INJECTION INTRAVENOUS; SUBCUTANEOUS at 20:17

## 2025-08-21 RX ADMIN — POTASSIUM CHLORIDE 20 MEQ: 29.8 INJECTION, SOLUTION INTRAVENOUS at 04:08

## 2025-08-21 RX ADMIN — SENNOSIDES, DOCUSATE SODIUM 2 TABLET: 50; 8.6 TABLET, FILM COATED ORAL at 20:17

## 2025-08-21 RX ADMIN — CEFEPIME 2000 MG: 2 INJECTION, POWDER, FOR SOLUTION INTRAVENOUS at 12:10

## 2025-08-21 RX ADMIN — CEFEPIME 2000 MG: 2 INJECTION, POWDER, FOR SOLUTION INTRAVENOUS at 20:17

## 2025-08-21 RX ADMIN — ATORVASTATIN CALCIUM 40 MG: 40 TABLET, FILM COATED ORAL at 20:17

## 2025-08-21 RX ADMIN — Medication 10 ML: at 20:18

## 2025-08-21 RX ADMIN — GABAPENTIN 200 MG: 100 CAPSULE ORAL at 13:05

## 2025-08-21 RX ADMIN — CYPROHEPTADINE HYDROCHLORIDE 2 MG: 4 TABLET ORAL at 20:18

## 2025-08-21 RX ADMIN — CYPROHEPTADINE HYDROCHLORIDE 4 MG: 4 TABLET ORAL at 12:10

## 2025-08-21 RX ADMIN — IPRATROPIUM BROMIDE AND ALBUTEROL SULFATE 3 ML: .5; 3 SOLUTION RESPIRATORY (INHALATION) at 16:01

## 2025-08-21 RX ADMIN — DEXMEDETOMIDINE HYDROCHLORIDE 0.6 MCG/KG/HR: 4 INJECTION, SOLUTION INTRAVENOUS at 07:35

## 2025-08-21 RX ADMIN — POTASSIUM CHLORIDE 20 MEQ: 29.8 INJECTION, SOLUTION INTRAVENOUS at 01:23

## 2025-08-21 RX ADMIN — HEPARIN SODIUM 5000 UNITS: 5000 INJECTION INTRAVENOUS; SUBCUTANEOUS at 05:36

## 2025-08-21 RX ADMIN — IPRATROPIUM BROMIDE AND ALBUTEROL SULFATE 3 ML: .5; 3 SOLUTION RESPIRATORY (INHALATION) at 07:11

## 2025-08-21 RX ADMIN — PANTOPRAZOLE SODIUM 40 MG: 40 INJECTION, POWDER, FOR SOLUTION INTRAVENOUS at 05:36

## 2025-08-21 RX ADMIN — IPRATROPIUM BROMIDE AND ALBUTEROL SULFATE 3 ML: .5; 3 SOLUTION RESPIRATORY (INHALATION) at 12:37

## 2025-08-21 RX ADMIN — LACTULOSE SOLUTION USP, 10 G/15 ML 20 G: 10 SOLUTION ORAL; RECTAL at 20:19

## 2025-08-21 RX ADMIN — Medication 10 ML: at 09:07

## 2025-08-21 RX ADMIN — GABAPENTIN 200 MG: 100 CAPSULE ORAL at 20:17

## 2025-08-22 LAB
ANION GAP SERPL CALCULATED.3IONS-SCNC: 12 MMOL/L (ref 5–15)
BUN SERPL-MCNC: 8.9 MG/DL (ref 8–23)
BUN/CREAT SERPL: 15.9 (ref 7–25)
CALCIUM SPEC-SCNC: 8.7 MG/DL (ref 8.6–10.5)
CHLORIDE SERPL-SCNC: 107 MMOL/L (ref 98–107)
CO2 SERPL-SCNC: 21 MMOL/L (ref 22–29)
CREAT SERPL-MCNC: 0.56 MG/DL (ref 0.57–1)
EGFRCR SERPLBLD CKD-EPI 2021: 104 ML/MIN/1.73
GLUCOSE BLDC GLUCOMTR-MCNC: 133 MG/DL (ref 70–130)
GLUCOSE BLDC GLUCOMTR-MCNC: 81 MG/DL (ref 70–130)
GLUCOSE SERPL-MCNC: 81 MG/DL (ref 65–99)
Lab: NORMAL
MAGNESIUM SERPL-MCNC: 2.1 MG/DL (ref 1.6–2.4)
PHOSPHATE SERPL-MCNC: 2.6 MG/DL (ref 2.5–4.5)
POTASSIUM SERPL-SCNC: 3.2 MMOL/L (ref 3.5–5.2)
POTASSIUM SERPL-SCNC: 3.9 MMOL/L (ref 3.5–5.2)
QT INTERVAL: 366 MS
QT INTERVAL: 382 MS
QT INTERVAL: 394 MS
QTC INTERVAL: 435 MS
QTC INTERVAL: 452 MS
QTC INTERVAL: 454 MS
SODIUM SERPL-SCNC: 140 MMOL/L (ref 136–145)

## 2025-08-22 PROCEDURE — 25010000002 HEPARIN (PORCINE) PER 1000 UNITS: Performed by: INTERNAL MEDICINE

## 2025-08-22 PROCEDURE — 92507 TX SP LANG VOICE COMM INDIV: CPT

## 2025-08-22 PROCEDURE — 82948 REAGENT STRIP/BLOOD GLUCOSE: CPT

## 2025-08-22 PROCEDURE — 92526 ORAL FUNCTION THERAPY: CPT

## 2025-08-22 PROCEDURE — 83735 ASSAY OF MAGNESIUM: CPT | Performed by: NURSE PRACTITIONER

## 2025-08-22 PROCEDURE — 94799 UNLISTED PULMONARY SVC/PX: CPT

## 2025-08-22 PROCEDURE — 25010000002 CEFEPIME PER 500 MG: Performed by: INTERNAL MEDICINE

## 2025-08-22 PROCEDURE — 99232 SBSQ HOSP IP/OBS MODERATE 35: CPT | Performed by: STUDENT IN AN ORGANIZED HEALTH CARE EDUCATION/TRAINING PROGRAM

## 2025-08-22 PROCEDURE — 84132 ASSAY OF SERUM POTASSIUM: CPT | Performed by: NURSE PRACTITIONER

## 2025-08-22 PROCEDURE — 99232 SBSQ HOSP IP/OBS MODERATE 35: CPT | Performed by: PSYCHIATRY & NEUROLOGY

## 2025-08-22 PROCEDURE — 80048 BASIC METABOLIC PNL TOTAL CA: CPT | Performed by: NURSE PRACTITIONER

## 2025-08-22 PROCEDURE — 84100 ASSAY OF PHOSPHORUS: CPT | Performed by: NURSE PRACTITIONER

## 2025-08-22 PROCEDURE — 94761 N-INVAS EAR/PLS OXIMETRY MLT: CPT

## 2025-08-22 RX ORDER — NICOTINE 21 MG/24HR
1 PATCH, TRANSDERMAL 24 HOURS TRANSDERMAL
Status: DISCONTINUED | OUTPATIENT
Start: 2025-08-22 | End: 2025-08-25 | Stop reason: HOSPADM

## 2025-08-22 RX ORDER — CYPROHEPTADINE HYDROCHLORIDE 4 MG/1
2 TABLET ORAL EVERY 12 HOURS
Status: COMPLETED | OUTPATIENT
Start: 2025-08-23 | End: 2025-08-24

## 2025-08-22 RX ORDER — POTASSIUM CHLORIDE 1500 MG/1
40 TABLET, EXTENDED RELEASE ORAL EVERY 4 HOURS
Status: COMPLETED | OUTPATIENT
Start: 2025-08-22 | End: 2025-08-22

## 2025-08-22 RX ADMIN — IPRATROPIUM BROMIDE AND ALBUTEROL SULFATE 3 ML: .5; 3 SOLUTION RESPIRATORY (INHALATION) at 13:25

## 2025-08-22 RX ADMIN — PANTOPRAZOLE SODIUM 40 MG: 40 INJECTION, POWDER, FOR SOLUTION INTRAVENOUS at 05:12

## 2025-08-22 RX ADMIN — GABAPENTIN 200 MG: 100 CAPSULE ORAL at 13:02

## 2025-08-22 RX ADMIN — SENNOSIDES, DOCUSATE SODIUM 2 TABLET: 50; 8.6 TABLET, FILM COATED ORAL at 20:23

## 2025-08-22 RX ADMIN — HEPARIN SODIUM 5000 UNITS: 5000 INJECTION INTRAVENOUS; SUBCUTANEOUS at 23:04

## 2025-08-22 RX ADMIN — HEPARIN SODIUM 5000 UNITS: 5000 INJECTION INTRAVENOUS; SUBCUTANEOUS at 05:12

## 2025-08-22 RX ADMIN — CEFEPIME 2000 MG: 2 INJECTION, POWDER, FOR SOLUTION INTRAVENOUS at 20:28

## 2025-08-22 RX ADMIN — CEFEPIME 2000 MG: 2 INJECTION, POWDER, FOR SOLUTION INTRAVENOUS at 11:11

## 2025-08-22 RX ADMIN — GABAPENTIN 200 MG: 100 CAPSULE ORAL at 23:04

## 2025-08-22 RX ADMIN — NICOTINE 1 PATCH: 14 PATCH TRANSDERMAL at 10:22

## 2025-08-22 RX ADMIN — POTASSIUM CHLORIDE 40 MEQ: 1500 TABLET, EXTENDED RELEASE ORAL at 05:12

## 2025-08-22 RX ADMIN — GABAPENTIN 200 MG: 100 CAPSULE ORAL at 05:12

## 2025-08-22 RX ADMIN — CYPROHEPTADINE HYDROCHLORIDE 2 MG: 4 TABLET ORAL at 12:11

## 2025-08-22 RX ADMIN — CYPROHEPTADINE HYDROCHLORIDE 2 MG: 4 TABLET ORAL at 05:12

## 2025-08-22 RX ADMIN — IPRATROPIUM BROMIDE AND ALBUTEROL SULFATE 3 ML: .5; 3 SOLUTION RESPIRATORY (INHALATION) at 08:20

## 2025-08-22 RX ADMIN — Medication 10 ML: at 20:23

## 2025-08-22 RX ADMIN — HEPARIN SODIUM 5000 UNITS: 5000 INJECTION INTRAVENOUS; SUBCUTANEOUS at 13:02

## 2025-08-22 RX ADMIN — CEFEPIME 2000 MG: 2 INJECTION, POWDER, FOR SOLUTION INTRAVENOUS at 05:12

## 2025-08-22 RX ADMIN — LEVOTHYROXINE SODIUM 100 MCG: 0.1 TABLET ORAL at 05:12

## 2025-08-22 RX ADMIN — POTASSIUM CHLORIDE 40 MEQ: 1500 TABLET, EXTENDED RELEASE ORAL at 09:07

## 2025-08-22 RX ADMIN — IPRATROPIUM BROMIDE AND ALBUTEROL SULFATE 3 ML: .5; 3 SOLUTION RESPIRATORY (INHALATION) at 20:01

## 2025-08-22 RX ADMIN — Medication 10 ML: at 08:30

## 2025-08-22 RX ADMIN — ATORVASTATIN CALCIUM 40 MG: 40 TABLET, FILM COATED ORAL at 20:23

## 2025-08-23 LAB — GLUCOSE BLDC GLUCOMTR-MCNC: 94 MG/DL (ref 70–130)

## 2025-08-23 PROCEDURE — 82948 REAGENT STRIP/BLOOD GLUCOSE: CPT

## 2025-08-23 PROCEDURE — 94799 UNLISTED PULMONARY SVC/PX: CPT

## 2025-08-23 PROCEDURE — 25010000002 CEFEPIME PER 500 MG: Performed by: INTERNAL MEDICINE

## 2025-08-23 PROCEDURE — 94761 N-INVAS EAR/PLS OXIMETRY MLT: CPT

## 2025-08-23 PROCEDURE — 25810000003 SODIUM CHLORIDE 0.9 % SOLUTION 250 ML FLEX CONT

## 2025-08-23 PROCEDURE — 99232 SBSQ HOSP IP/OBS MODERATE 35: CPT | Performed by: HOSPITALIST

## 2025-08-23 PROCEDURE — 25010000002 HEPARIN (PORCINE) PER 1000 UNITS: Performed by: INTERNAL MEDICINE

## 2025-08-23 PROCEDURE — 94664 DEMO&/EVAL PT USE INHALER: CPT

## 2025-08-23 RX ORDER — PANTOPRAZOLE SODIUM 40 MG/1
40 TABLET, DELAYED RELEASE ORAL
Status: DISCONTINUED | OUTPATIENT
Start: 2025-08-23 | End: 2025-08-25 | Stop reason: HOSPADM

## 2025-08-23 RX ADMIN — CEFEPIME 2000 MG: 2 INJECTION, POWDER, FOR SOLUTION INTRAVENOUS at 20:13

## 2025-08-23 RX ADMIN — GABAPENTIN 200 MG: 100 CAPSULE ORAL at 20:15

## 2025-08-23 RX ADMIN — HEPARIN SODIUM 5000 UNITS: 5000 INJECTION INTRAVENOUS; SUBCUTANEOUS at 15:06

## 2025-08-23 RX ADMIN — IPRATROPIUM BROMIDE AND ALBUTEROL SULFATE 3 ML: .5; 3 SOLUTION RESPIRATORY (INHALATION) at 19:48

## 2025-08-23 RX ADMIN — IPRATROPIUM BROMIDE AND ALBUTEROL SULFATE 3 ML: .5; 3 SOLUTION RESPIRATORY (INHALATION) at 13:04

## 2025-08-23 RX ADMIN — GABAPENTIN 200 MG: 100 CAPSULE ORAL at 05:01

## 2025-08-23 RX ADMIN — CYPROHEPTADINE HYDROCHLORIDE 2 MG: 4 TABLET ORAL at 23:21

## 2025-08-23 RX ADMIN — NICOTINE 1 PATCH: 14 PATCH TRANSDERMAL at 08:39

## 2025-08-23 RX ADMIN — Medication 10 ML: at 20:17

## 2025-08-23 RX ADMIN — ATORVASTATIN CALCIUM 40 MG: 40 TABLET, FILM COATED ORAL at 20:17

## 2025-08-23 RX ADMIN — CEFEPIME 2000 MG: 2 INJECTION, POWDER, FOR SOLUTION INTRAVENOUS at 05:01

## 2025-08-23 RX ADMIN — IPRATROPIUM BROMIDE AND ALBUTEROL SULFATE 3 ML: .5; 3 SOLUTION RESPIRATORY (INHALATION) at 16:21

## 2025-08-23 RX ADMIN — Medication 10 ML: at 08:38

## 2025-08-23 RX ADMIN — SENNOSIDES, DOCUSATE SODIUM 2 TABLET: 50; 8.6 TABLET, FILM COATED ORAL at 08:38

## 2025-08-23 RX ADMIN — SODIUM CHLORIDE 40 ML: 9 INJECTION, SOLUTION INTRAVENOUS at 11:54

## 2025-08-23 RX ADMIN — HEPARIN SODIUM 5000 UNITS: 5000 INJECTION INTRAVENOUS; SUBCUTANEOUS at 05:01

## 2025-08-23 RX ADMIN — PANTOPRAZOLE SODIUM 40 MG: 40 TABLET, DELAYED RELEASE ORAL at 05:01

## 2025-08-23 RX ADMIN — CEFEPIME 2000 MG: 2 INJECTION, POWDER, FOR SOLUTION INTRAVENOUS at 11:53

## 2025-08-23 RX ADMIN — CYPROHEPTADINE HYDROCHLORIDE 2 MG: 4 TABLET ORAL at 11:52

## 2025-08-23 RX ADMIN — GABAPENTIN 200 MG: 100 CAPSULE ORAL at 15:06

## 2025-08-23 RX ADMIN — CYPROHEPTADINE HYDROCHLORIDE 2 MG: 4 TABLET ORAL at 02:38

## 2025-08-23 RX ADMIN — LEVOTHYROXINE SODIUM 100 MCG: 0.1 TABLET ORAL at 05:01

## 2025-08-23 RX ADMIN — IPRATROPIUM BROMIDE AND ALBUTEROL SULFATE 3 ML: .5; 3 SOLUTION RESPIRATORY (INHALATION) at 08:15

## 2025-08-23 RX ADMIN — HEPARIN SODIUM 5000 UNITS: 5000 INJECTION INTRAVENOUS; SUBCUTANEOUS at 20:16

## 2025-08-24 LAB
ANION GAP SERPL CALCULATED.3IONS-SCNC: 11.1 MMOL/L (ref 5–15)
BUN SERPL-MCNC: 11 MG/DL (ref 8–23)
BUN/CREAT SERPL: 16.2 (ref 7–25)
CALCIUM SPEC-SCNC: 9 MG/DL (ref 8.6–10.5)
CHLORIDE SERPL-SCNC: 109 MMOL/L (ref 98–107)
CO2 SERPL-SCNC: 21.9 MMOL/L (ref 22–29)
CREAT SERPL-MCNC: 0.68 MG/DL (ref 0.57–1)
DEPRECATED RDW RBC AUTO: 50.2 FL (ref 37–54)
EGFRCR SERPLBLD CKD-EPI 2021: 99.2 ML/MIN/1.73
ERYTHROCYTE [DISTWIDTH] IN BLOOD BY AUTOMATED COUNT: 14.3 % (ref 12.3–15.4)
GLUCOSE SERPL-MCNC: 126 MG/DL (ref 65–99)
HCT VFR BLD AUTO: 34.6 % (ref 34–46.6)
HGB BLD-MCNC: 11.1 G/DL (ref 12–15.9)
MCH RBC QN AUTO: 30.9 PG (ref 26.6–33)
MCHC RBC AUTO-ENTMCNC: 32.1 G/DL (ref 31.5–35.7)
MCV RBC AUTO: 96.4 FL (ref 79–97)
PLATELET # BLD AUTO: 333 10*3/MM3 (ref 140–450)
PMV BLD AUTO: 10.2 FL (ref 6–12)
POTASSIUM SERPL-SCNC: 3.4 MMOL/L (ref 3.5–5.2)
POTASSIUM SERPL-SCNC: 4.6 MMOL/L (ref 3.5–5.2)
RBC # BLD AUTO: 3.59 10*6/MM3 (ref 3.77–5.28)
SODIUM SERPL-SCNC: 142 MMOL/L (ref 136–145)
WBC NRBC COR # BLD AUTO: 14.05 10*3/MM3 (ref 3.4–10.8)

## 2025-08-24 PROCEDURE — 97165 OT EVAL LOW COMPLEX 30 MIN: CPT

## 2025-08-24 PROCEDURE — 84132 ASSAY OF SERUM POTASSIUM: CPT | Performed by: HOSPITALIST

## 2025-08-24 PROCEDURE — 94664 DEMO&/EVAL PT USE INHALER: CPT

## 2025-08-24 PROCEDURE — 94799 UNLISTED PULMONARY SVC/PX: CPT

## 2025-08-24 PROCEDURE — 99232 SBSQ HOSP IP/OBS MODERATE 35: CPT | Performed by: NURSE PRACTITIONER

## 2025-08-24 PROCEDURE — 85027 COMPLETE CBC AUTOMATED: CPT | Performed by: HOSPITALIST

## 2025-08-24 PROCEDURE — 25010000002 HEPARIN (PORCINE) PER 1000 UNITS: Performed by: INTERNAL MEDICINE

## 2025-08-24 PROCEDURE — 97161 PT EVAL LOW COMPLEX 20 MIN: CPT

## 2025-08-24 PROCEDURE — 25010000002 CEFEPIME PER 500 MG: Performed by: INTERNAL MEDICINE

## 2025-08-24 PROCEDURE — 80048 BASIC METABOLIC PNL TOTAL CA: CPT | Performed by: HOSPITALIST

## 2025-08-24 RX ORDER — POTASSIUM CHLORIDE 1500 MG/1
40 TABLET, EXTENDED RELEASE ORAL EVERY 4 HOURS
Status: COMPLETED | OUTPATIENT
Start: 2025-08-24 | End: 2025-08-24

## 2025-08-24 RX ADMIN — POTASSIUM CHLORIDE 40 MEQ: 1500 TABLET, EXTENDED RELEASE ORAL at 14:55

## 2025-08-24 RX ADMIN — NICOTINE 1 PATCH: 14 PATCH TRANSDERMAL at 08:53

## 2025-08-24 RX ADMIN — HEPARIN SODIUM 5000 UNITS: 5000 INJECTION INTRAVENOUS; SUBCUTANEOUS at 05:25

## 2025-08-24 RX ADMIN — Medication 10 ML: at 21:01

## 2025-08-24 RX ADMIN — IPRATROPIUM BROMIDE AND ALBUTEROL SULFATE 3 ML: .5; 3 SOLUTION RESPIRATORY (INHALATION) at 15:48

## 2025-08-24 RX ADMIN — IPRATROPIUM BROMIDE AND ALBUTEROL SULFATE 3 ML: .5; 3 SOLUTION RESPIRATORY (INHALATION) at 11:49

## 2025-08-24 RX ADMIN — POTASSIUM CHLORIDE 40 MEQ: 1500 TABLET, EXTENDED RELEASE ORAL at 11:26

## 2025-08-24 RX ADMIN — CYPROHEPTADINE HYDROCHLORIDE 2 MG: 4 TABLET ORAL at 11:26

## 2025-08-24 RX ADMIN — CEFEPIME 2000 MG: 2 INJECTION, POWDER, FOR SOLUTION INTRAVENOUS at 03:32

## 2025-08-24 RX ADMIN — IPRATROPIUM BROMIDE AND ALBUTEROL SULFATE 3 ML: .5; 3 SOLUTION RESPIRATORY (INHALATION) at 19:23

## 2025-08-24 RX ADMIN — CEFEPIME 2000 MG: 2 INJECTION, POWDER, FOR SOLUTION INTRAVENOUS at 11:25

## 2025-08-24 RX ADMIN — HEPARIN SODIUM 5000 UNITS: 5000 INJECTION INTRAVENOUS; SUBCUTANEOUS at 21:01

## 2025-08-24 RX ADMIN — SENNOSIDES, DOCUSATE SODIUM 2 TABLET: 50; 8.6 TABLET, FILM COATED ORAL at 21:01

## 2025-08-24 RX ADMIN — LACTULOSE SOLUTION USP, 10 G/15 ML 20 G: 10 SOLUTION ORAL; RECTAL at 21:01

## 2025-08-24 RX ADMIN — ACETAMINOPHEN 650 MG: 650 SOLUTION ORAL at 10:31

## 2025-08-24 RX ADMIN — GABAPENTIN 200 MG: 100 CAPSULE ORAL at 05:23

## 2025-08-24 RX ADMIN — LEVOTHYROXINE SODIUM 100 MCG: 0.1 TABLET ORAL at 05:24

## 2025-08-24 RX ADMIN — GABAPENTIN 200 MG: 100 CAPSULE ORAL at 14:55

## 2025-08-24 RX ADMIN — IPRATROPIUM BROMIDE AND ALBUTEROL SULFATE 3 ML: .5; 3 SOLUTION RESPIRATORY (INHALATION) at 08:09

## 2025-08-24 RX ADMIN — ATORVASTATIN CALCIUM 40 MG: 40 TABLET, FILM COATED ORAL at 21:04

## 2025-08-24 RX ADMIN — HEPARIN SODIUM 5000 UNITS: 5000 INJECTION INTRAVENOUS; SUBCUTANEOUS at 14:55

## 2025-08-24 RX ADMIN — GABAPENTIN 200 MG: 100 CAPSULE ORAL at 21:04

## 2025-08-24 RX ADMIN — PANTOPRAZOLE SODIUM 40 MG: 40 TABLET, DELAYED RELEASE ORAL at 05:24

## 2025-08-25 ENCOUNTER — HOSPITAL ENCOUNTER (INPATIENT)
Facility: HOSPITAL | Age: 60
LOS: 4 days | Discharge: HOME | End: 2025-08-29
Attending: PSYCHIATRY & NEUROLOGY | Admitting: PSYCHIATRY & NEUROLOGY
Payer: MEDICAID

## 2025-08-25 VITALS
RESPIRATION RATE: 16 BRPM | HEART RATE: 94 BPM | TEMPERATURE: 98.3 F | HEIGHT: 63 IN | DIASTOLIC BLOOD PRESSURE: 73 MMHG | BODY MASS INDEX: 26.33 KG/M2 | SYSTOLIC BLOOD PRESSURE: 112 MMHG | OXYGEN SATURATION: 94 % | WEIGHT: 148.59 LBS

## 2025-08-25 DIAGNOSIS — M54.41 CHRONIC BILATERAL LOW BACK PAIN WITH RIGHT-SIDED SCIATICA: Primary | ICD-10-CM

## 2025-08-25 DIAGNOSIS — G89.29 CHRONIC BILATERAL LOW BACK PAIN WITH RIGHT-SIDED SCIATICA: Primary | ICD-10-CM

## 2025-08-25 PROBLEM — G93.41 ACUTE METABOLIC ENCEPHALOPATHY: Status: RESOLVED | Noted: 2025-08-16 | Resolved: 2025-08-25

## 2025-08-25 PROBLEM — J96.01 ACUTE HYPOXIC RESPIRATORY FAILURE: Status: RESOLVED | Noted: 2025-08-16 | Resolved: 2025-08-25

## 2025-08-25 PROBLEM — R45.89 SUICIDAL BEHAVIOR: Status: ACTIVE | Noted: 2025-08-25

## 2025-08-25 LAB
BILIRUB UR QL STRIP: NEGATIVE
CLARITY UR: CLEAR
COLOR UR: ABNORMAL
GLUCOSE UR STRIP-MCNC: NEGATIVE MG/DL
HAV IGM SERPL QL IA: NORMAL
HBV CORE IGM SERPL QL IA: NORMAL
HBV SURFACE AG SERPL QL IA: NORMAL
HCV AB SER QL: NORMAL
HGB UR QL STRIP.AUTO: NEGATIVE
KETONES UR QL STRIP: NEGATIVE
LEUKOCYTE ESTERASE UR QL STRIP.AUTO: NEGATIVE
NITRITE UR QL STRIP: NEGATIVE
PH UR STRIP.AUTO: 7 [PH] (ref 5–8)
PROT UR QL STRIP: NEGATIVE
QT INTERVAL: 392 MS
QTC INTERVAL: 457 MS
SP GR UR STRIP: 1.01 (ref 1–1.03)
UROBILINOGEN UR QL STRIP: ABNORMAL

## 2025-08-25 PROCEDURE — 94799 UNLISTED PULMONARY SVC/PX: CPT

## 2025-08-25 PROCEDURE — 99232 SBSQ HOSP IP/OBS MODERATE 35: CPT | Performed by: PSYCHIATRY & NEUROLOGY

## 2025-08-25 PROCEDURE — 93005 ELECTROCARDIOGRAM TRACING: CPT | Performed by: NURSE PRACTITIONER

## 2025-08-25 PROCEDURE — 93005 ELECTROCARDIOGRAM TRACING: CPT | Performed by: PSYCHIATRY & NEUROLOGY

## 2025-08-25 PROCEDURE — 99238 HOSP IP/OBS DSCHRG MGMT 30/<: CPT | Performed by: NURSE PRACTITIONER

## 2025-08-25 PROCEDURE — 25010000002 HEPARIN (PORCINE) PER 1000 UNITS: Performed by: INTERNAL MEDICINE

## 2025-08-25 PROCEDURE — 94761 N-INVAS EAR/PLS OXIMETRY MLT: CPT

## 2025-08-25 PROCEDURE — 93010 ELECTROCARDIOGRAM REPORT: CPT | Performed by: INTERNAL MEDICINE

## 2025-08-25 PROCEDURE — 81003 URINALYSIS AUTO W/O SCOPE: CPT | Performed by: NURSE PRACTITIONER

## 2025-08-25 PROCEDURE — 80074 ACUTE HEPATITIS PANEL: CPT | Performed by: PSYCHIATRY & NEUROLOGY

## 2025-08-25 RX ORDER — BISACODYL 5 MG/1
5 TABLET, DELAYED RELEASE ORAL DAILY PRN
Status: DISCONTINUED | OUTPATIENT
Start: 2025-08-25 | End: 2025-08-29 | Stop reason: HOSPADM

## 2025-08-25 RX ORDER — BENZONATATE 100 MG/1
100 CAPSULE ORAL 3 TIMES DAILY PRN
Status: DISCONTINUED | OUTPATIENT
Start: 2025-08-25 | End: 2025-08-29 | Stop reason: HOSPADM

## 2025-08-25 RX ORDER — NICOTINE 21 MG/24HR
1 PATCH, TRANSDERMAL 24 HOURS TRANSDERMAL
Status: ON HOLD
Start: 2025-08-26 | End: 2025-08-25 | Stop reason: SDUPTHER

## 2025-08-25 RX ORDER — TRAZODONE HYDROCHLORIDE 50 MG/1
12.5 TABLET ORAL NIGHTLY PRN
Status: ACTIVE | OUTPATIENT
Start: 2025-08-25 | End: 2025-08-27

## 2025-08-25 RX ORDER — ACETAMINOPHEN 325 MG/1
650 TABLET ORAL EVERY 6 HOURS PRN
Status: DISCONTINUED | OUTPATIENT
Start: 2025-08-25 | End: 2025-08-29 | Stop reason: HOSPADM

## 2025-08-25 RX ORDER — ECHINACEA PURPUREA EXTRACT 125 MG
2 TABLET ORAL AS NEEDED
Status: DISCONTINUED | OUTPATIENT
Start: 2025-08-25 | End: 2025-08-29 | Stop reason: HOSPADM

## 2025-08-25 RX ORDER — ACETAMINOPHEN 160 MG/5ML
650 SOLUTION ORAL EVERY 6 HOURS PRN
Status: ON HOLD
Start: 2025-08-25 | End: 2025-08-25 | Stop reason: SDUPTHER

## 2025-08-25 RX ORDER — GABAPENTIN 300 MG/1
600 CAPSULE ORAL EVERY 8 HOURS SCHEDULED
Status: CANCELLED | OUTPATIENT
Start: 2025-08-25

## 2025-08-25 RX ORDER — PANTOPRAZOLE SODIUM 40 MG/1
40 TABLET, DELAYED RELEASE ORAL
Status: ON HOLD
Start: 2025-08-26 | End: 2025-08-25 | Stop reason: SDUPTHER

## 2025-08-25 RX ORDER — GABAPENTIN 600 MG/1
600 TABLET ORAL 3 TIMES DAILY PRN
COMMUNITY
End: 2025-08-29 | Stop reason: HOSPADM

## 2025-08-25 RX ORDER — LEVOTHYROXINE SODIUM 50 UG/1
50 TABLET ORAL EVERY MORNING
Status: CANCELLED | OUTPATIENT
Start: 2025-08-26

## 2025-08-25 RX ORDER — LOPERAMIDE HYDROCHLORIDE 2 MG/1
2 CAPSULE ORAL
Status: DISCONTINUED | OUTPATIENT
Start: 2025-08-25 | End: 2025-08-29 | Stop reason: HOSPADM

## 2025-08-25 RX ORDER — LEVOTHYROXINE SODIUM 50 UG/1
50 TABLET ORAL EVERY MORNING
COMMUNITY
End: 2025-08-29 | Stop reason: HOSPADM

## 2025-08-25 RX ORDER — ONDANSETRON 4 MG/1
4 TABLET, ORALLY DISINTEGRATING ORAL EVERY 6 HOURS PRN
Status: DISCONTINUED | OUTPATIENT
Start: 2025-08-25 | End: 2025-08-29 | Stop reason: HOSPADM

## 2025-08-25 RX ORDER — ALUMINA, MAGNESIA, AND SIMETHICONE 2400; 2400; 240 MG/30ML; MG/30ML; MG/30ML
15 SUSPENSION ORAL EVERY 6 HOURS PRN
Status: DISCONTINUED | OUTPATIENT
Start: 2025-08-25 | End: 2025-08-29 | Stop reason: HOSPADM

## 2025-08-25 RX ORDER — IBUPROFEN 400 MG/1
400 TABLET, FILM COATED ORAL EVERY 6 HOURS PRN
Status: DISCONTINUED | OUTPATIENT
Start: 2025-08-25 | End: 2025-08-29 | Stop reason: HOSPADM

## 2025-08-25 RX ADMIN — HEPARIN SODIUM 5000 UNITS: 5000 INJECTION INTRAVENOUS; SUBCUTANEOUS at 05:06

## 2025-08-25 RX ADMIN — IPRATROPIUM BROMIDE AND ALBUTEROL SULFATE 3 ML: .5; 3 SOLUTION RESPIRATORY (INHALATION) at 11:36

## 2025-08-25 RX ADMIN — GABAPENTIN 200 MG: 100 CAPSULE ORAL at 14:13

## 2025-08-25 RX ADMIN — GABAPENTIN 200 MG: 100 CAPSULE ORAL at 05:06

## 2025-08-25 RX ADMIN — NICOTINE 1 PATCH: 14 PATCH TRANSDERMAL at 08:33

## 2025-08-25 RX ADMIN — IPRATROPIUM BROMIDE AND ALBUTEROL SULFATE 3 ML: .5; 3 SOLUTION RESPIRATORY (INHALATION) at 07:46

## 2025-08-25 RX ADMIN — LEVOTHYROXINE SODIUM 100 MCG: 0.1 TABLET ORAL at 05:06

## 2025-08-25 RX ADMIN — PANTOPRAZOLE SODIUM 40 MG: 40 TABLET, DELAYED RELEASE ORAL at 05:06

## 2025-08-26 LAB
CHOLEST SERPL-MCNC: 155 MG/DL (ref 0–200)
HBA1C MFR BLD: 5.73 % (ref 4.8–5.6)
HDLC SERPL-MCNC: 32 MG/DL (ref 40–60)
LDLC SERPL CALC-MCNC: 91 MG/DL (ref 0–100)
LDLC/HDLC SERPL: 2.69 {RATIO}
QT INTERVAL: 354 MS
QTC INTERVAL: 435 MS
TRIGL SERPL-MCNC: 185 MG/DL (ref 0–150)
VLDLC SERPL-MCNC: 32 MG/DL (ref 5–40)

## 2025-08-26 PROCEDURE — 80061 LIPID PANEL: CPT | Performed by: PSYCHIATRY & NEUROLOGY

## 2025-08-26 PROCEDURE — 83036 HEMOGLOBIN GLYCOSYLATED A1C: CPT | Performed by: PSYCHIATRY & NEUROLOGY

## 2025-08-26 RX ORDER — ATORVASTATIN CALCIUM 40 MG/1
40 TABLET, FILM COATED ORAL NIGHTLY
Status: DISCONTINUED | OUTPATIENT
Start: 2025-08-26 | End: 2025-08-29 | Stop reason: HOSPADM

## 2025-08-26 RX ORDER — CETIRIZINE HYDROCHLORIDE 10 MG/1
10 TABLET ORAL DAILY
Status: DISCONTINUED | OUTPATIENT
Start: 2025-08-26 | End: 2025-08-29 | Stop reason: HOSPADM

## 2025-08-26 RX ORDER — CETIRIZINE HYDROCHLORIDE 10 MG/1
10 TABLET ORAL DAILY
Status: CANCELLED | OUTPATIENT
Start: 2025-08-26

## 2025-08-26 RX ORDER — LEVOTHYROXINE SODIUM 50 UG/1
100 TABLET ORAL
Status: DISCONTINUED | OUTPATIENT
Start: 2025-08-26 | End: 2025-08-29 | Stop reason: HOSPADM

## 2025-08-26 RX ORDER — GABAPENTIN 100 MG/1
200 CAPSULE ORAL EVERY 8 HOURS SCHEDULED
Status: DISCONTINUED | OUTPATIENT
Start: 2025-08-26 | End: 2025-08-29 | Stop reason: HOSPADM

## 2025-08-26 RX ORDER — VENLAFAXINE HYDROCHLORIDE 37.5 MG/1
37.5 CAPSULE, EXTENDED RELEASE ORAL
Status: DISCONTINUED | OUTPATIENT
Start: 2025-08-26 | End: 2025-08-29 | Stop reason: HOSPADM

## 2025-08-26 RX ADMIN — GABAPENTIN 200 MG: 100 CAPSULE ORAL at 13:15

## 2025-08-26 RX ADMIN — ATORVASTATIN CALCIUM 40 MG: 40 TABLET, FILM COATED ORAL at 21:19

## 2025-08-26 RX ADMIN — GABAPENTIN 200 MG: 100 CAPSULE ORAL at 21:19

## 2025-08-26 RX ADMIN — LEVOTHYROXINE SODIUM 100 MCG: 50 TABLET ORAL at 08:46

## 2025-08-26 RX ADMIN — ACETAMINOPHEN 650 MG: 325 TABLET ORAL at 10:54

## 2025-08-26 RX ADMIN — VENLAFAXINE HYDROCHLORIDE 37.5 MG: 37.5 CAPSULE, EXTENDED RELEASE ORAL at 13:14

## 2025-08-26 RX ADMIN — CETIRIZINE HYDROCHLORIDE 10 MG: 10 TABLET, FILM COATED ORAL at 08:45

## 2025-08-27 RX ADMIN — CETIRIZINE HYDROCHLORIDE 10 MG: 10 TABLET, FILM COATED ORAL at 09:39

## 2025-08-27 RX ADMIN — ATORVASTATIN CALCIUM 40 MG: 40 TABLET, FILM COATED ORAL at 22:03

## 2025-08-27 RX ADMIN — GABAPENTIN 200 MG: 100 CAPSULE ORAL at 14:05

## 2025-08-27 RX ADMIN — GABAPENTIN 200 MG: 100 CAPSULE ORAL at 06:16

## 2025-08-27 RX ADMIN — VENLAFAXINE HYDROCHLORIDE 37.5 MG: 37.5 CAPSULE, EXTENDED RELEASE ORAL at 09:39

## 2025-08-27 RX ADMIN — LEVOTHYROXINE SODIUM 100 MCG: 50 TABLET ORAL at 09:39

## 2025-08-27 RX ADMIN — GABAPENTIN 200 MG: 100 CAPSULE ORAL at 22:03

## 2025-08-28 RX ADMIN — LEVOTHYROXINE SODIUM 100 MCG: 50 TABLET ORAL at 06:42

## 2025-08-28 RX ADMIN — ATORVASTATIN CALCIUM 40 MG: 40 TABLET, FILM COATED ORAL at 21:22

## 2025-08-28 RX ADMIN — GABAPENTIN 200 MG: 100 CAPSULE ORAL at 14:27

## 2025-08-28 RX ADMIN — CETIRIZINE HYDROCHLORIDE 10 MG: 10 TABLET, FILM COATED ORAL at 08:39

## 2025-08-28 RX ADMIN — VENLAFAXINE HYDROCHLORIDE 37.5 MG: 37.5 CAPSULE, EXTENDED RELEASE ORAL at 10:05

## 2025-08-28 RX ADMIN — GABAPENTIN 200 MG: 100 CAPSULE ORAL at 21:22

## 2025-08-28 RX ADMIN — GABAPENTIN 200 MG: 100 CAPSULE ORAL at 06:42

## 2025-08-29 VITALS
RESPIRATION RATE: 16 BRPM | BODY MASS INDEX: 24.95 KG/M2 | HEART RATE: 102 BPM | OXYGEN SATURATION: 95 % | WEIGHT: 146.13 LBS | TEMPERATURE: 98.2 F | HEIGHT: 64 IN | SYSTOLIC BLOOD PRESSURE: 124 MMHG | DIASTOLIC BLOOD PRESSURE: 86 MMHG

## 2025-08-29 RX ORDER — VENLAFAXINE HYDROCHLORIDE 37.5 MG/1
37.5 CAPSULE, EXTENDED RELEASE ORAL
Qty: 30 CAPSULE | Refills: 0 | Status: SHIPPED | OUTPATIENT
Start: 2025-08-30

## 2025-08-29 RX ORDER — LEVOTHYROXINE SODIUM 100 UG/1
100 TABLET ORAL
Qty: 30 TABLET | Refills: 0 | Status: SHIPPED | OUTPATIENT
Start: 2025-08-30

## 2025-08-29 RX ORDER — GABAPENTIN 100 MG/1
200 CAPSULE ORAL EVERY 8 HOURS SCHEDULED
Qty: 90 CAPSULE | Refills: 0 | Status: SHIPPED | OUTPATIENT
Start: 2025-08-29

## 2025-08-29 RX ADMIN — LEVOTHYROXINE SODIUM 100 MCG: 50 TABLET ORAL at 06:34

## 2025-08-29 RX ADMIN — CETIRIZINE HYDROCHLORIDE 10 MG: 10 TABLET, FILM COATED ORAL at 08:44

## 2025-08-29 RX ADMIN — VENLAFAXINE HYDROCHLORIDE 37.5 MG: 37.5 CAPSULE, EXTENDED RELEASE ORAL at 08:43

## 2025-08-29 RX ADMIN — GABAPENTIN 200 MG: 100 CAPSULE ORAL at 06:34
